# Patient Record
Sex: FEMALE | Race: WHITE | Employment: OTHER | URBAN - METROPOLITAN AREA
[De-identification: names, ages, dates, MRNs, and addresses within clinical notes are randomized per-mention and may not be internally consistent; named-entity substitution may affect disease eponyms.]

---

## 2017-04-28 PROBLEM — M54.2 NECK PAIN: Status: ACTIVE | Noted: 2017-04-28

## 2017-04-28 PROBLEM — M48.02 STENOSIS OF CERVICAL SPINE: Status: ACTIVE | Noted: 2017-04-28

## 2018-06-08 PROBLEM — Z85.3 HISTORY OF BREAST CANCER: Status: ACTIVE | Noted: 2018-06-08

## 2018-11-20 ENCOUNTER — HOSPITAL ENCOUNTER (OUTPATIENT)
Age: 68
Discharge: HOME | End: 2018-11-22
Payer: MEDICARE

## 2018-11-20 PROCEDURE — 87186 SC STD MICRODIL/AGAR DIL: CPT

## 2018-11-20 PROCEDURE — 87077 CULTURE AEROBIC IDENTIFY: CPT

## 2018-11-20 PROCEDURE — 87088 URINE BACTERIA CULTURE: CPT

## 2018-11-22 LAB
ORGANISM: ABNORMAL
URINE CULTURE, ROUTINE: ABNORMAL
URINE CULTURE, ROUTINE: ABNORMAL

## 2018-12-07 ENCOUNTER — HOSPITAL ENCOUNTER (OUTPATIENT)
Age: 68
Discharge: HOME | End: 2018-12-09
Payer: MEDICARE

## 2018-12-07 PROCEDURE — 87088 URINE BACTERIA CULTURE: CPT

## 2018-12-10 LAB — URINE CULTURE, ROUTINE: NORMAL

## 2019-02-13 ENCOUNTER — HOSPITAL ENCOUNTER (OUTPATIENT)
Age: 69
Discharge: HOME | End: 2019-02-15
Payer: MEDICARE

## 2019-02-13 PROCEDURE — 87088 URINE BACTERIA CULTURE: CPT

## 2019-02-16 LAB — URINE CULTURE, ROUTINE: NORMAL

## 2019-04-08 ENCOUNTER — HOSPITAL ENCOUNTER (OUTPATIENT)
Age: 69
Discharge: HOME | End: 2019-04-10
Payer: MEDICARE

## 2019-04-08 PROCEDURE — 87088 URINE BACTERIA CULTURE: CPT

## 2019-04-08 PROCEDURE — 88112 CYTOPATH CELL ENHANCE TECH: CPT

## 2019-04-10 LAB — URINE CULTURE, ROUTINE: NORMAL

## 2019-05-24 ENCOUNTER — OFFICE VISIT (OUTPATIENT)
Dept: SURGERY | Age: 69
End: 2019-05-24
Payer: MEDICARE

## 2019-05-24 VITALS
WEIGHT: 153 LBS | HEIGHT: 60 IN | HEART RATE: 75 BPM | RESPIRATION RATE: 16 BRPM | TEMPERATURE: 98 F | BODY MASS INDEX: 30.04 KG/M2 | DIASTOLIC BLOOD PRESSURE: 70 MMHG | SYSTOLIC BLOOD PRESSURE: 126 MMHG | OXYGEN SATURATION: 95 %

## 2019-05-24 DIAGNOSIS — Z98.890 HISTORY OF BREAST RECONSTRUCTION: Primary | ICD-10-CM

## 2019-05-24 PROCEDURE — 1036F TOBACCO NON-USER: CPT | Performed by: PLASTIC SURGERY

## 2019-05-24 PROCEDURE — 99204 OFFICE O/P NEW MOD 45 MIN: CPT | Performed by: PLASTIC SURGERY

## 2019-05-24 PROCEDURE — 4040F PNEUMOC VAC/ADMIN/RCVD: CPT | Performed by: PLASTIC SURGERY

## 2019-05-24 PROCEDURE — G8417 CALC BMI ABV UP PARAM F/U: HCPCS | Performed by: PLASTIC SURGERY

## 2019-05-24 PROCEDURE — 1123F ACP DISCUSS/DSCN MKR DOCD: CPT | Performed by: PLASTIC SURGERY

## 2019-05-24 PROCEDURE — 1090F PRES/ABSN URINE INCON ASSESS: CPT | Performed by: PLASTIC SURGERY

## 2019-05-24 PROCEDURE — G8400 PT W/DXA NO RESULTS DOC: HCPCS | Performed by: PLASTIC SURGERY

## 2019-05-24 PROCEDURE — 3017F COLORECTAL CA SCREEN DOC REV: CPT | Performed by: PLASTIC SURGERY

## 2019-05-24 PROCEDURE — G8428 CUR MEDS NOT DOCUMENT: HCPCS | Performed by: PLASTIC SURGERY

## 2019-05-24 RX ORDER — MAGNESIUM 200 MG
TABLET ORAL
COMMUNITY

## 2019-05-24 RX ORDER — PHENOL 1.4 %
1 AEROSOL, SPRAY (ML) MUCOUS MEMBRANE DAILY
COMMUNITY

## 2019-05-24 NOTE — PROGRESS NOTES
Department of Plastic Surgery - Adult  Attending Consult Note          CHIEF COMPLAINT:  History of Right Breast Cancer    History Obtained From:  patient    HISTORY OF PRESENT ILLNESS:                The patient is a 68 y.o. female who presents with History of Right breast cancer. She states she had a right mastectomy with a left prophylactic mastectomy in 1999. She then had immediate reconstruction with saline breast implants. She states that she had her right breast implant removed and replaced in 2003 with a new saline implant. She states her left breast implant is her original implant from [address] [place] [address]. She states that she noticed some left breast pain proximally one year prior she states that this is been constant since that time. She presents to our office today to have her implants examined and her left breast pain examined as well. Past Medical History:    Past Medical History:   Diagnosis Date    Cancer [facility])     1999 right breast cancer / treated with surgery and chemo    Depression     Ganglion cyst     right middle finger    Hyperlipidemia     Hypothyroidism     Thyroid disease      Past Surgical History:    Past Surgical History:   Procedure Laterality Date    BREAST SURGERY  1999    radical mastectomy r / mastectomy at left    CATARACT REMOVAL Bilateral 2012   [address]    CHOLECYSTECTOMY  2007    with Whipple    EYE SURGERY Right 1997    to repair muscle     HERNIA REPAIR Right years ago    inguinal    HYSTERECTOMY  1978    OTHER SURGICAL HISTORY Right 10/18/2017    EXCISION GANGLION CYST RIGHT MIDDLE FINGER    PANCREATECTOMY  2007    Whipple 1/3 taken      Current Medications:   No current facility-administered medications for this visit. Allergies:  Azithromycin;  Doxycycline; and Zanaflex [tizanidine]    Social History:   Social History     Socioeconomic History    Marital status:      Spouse name: Not on file    Number of children: Not on file    Years of education: Not on file    Highest education level: Not on file   Occupational History    Not on file   Social Needs    Financial resource strain: Not on file    Food insecurity:     Worry: Not on file     Inability: Not on file    Transportation needs:     Medical: Not on file     Non-medical: Not on file   Tobacco Use    Smoking status: Never Smoker    Smokeless tobacco: Never Used   Substance and Sexual Activity    Alcohol use: No    Drug use: No    Sexual activity: Not on file   Lifestyle    Physical activity:     Days per week: Not on file     Minutes per session: Not on file    Stress: Not on file   Relationships    Social connections:     Talks on phone: Not on file     Gets together: Not on file     Attends Denominational service: Not on file     Active member of club or organization: Not on file     Attends meetings of clubs or organizations: Not on file     Relationship status: Not on file    Intimate partner violence:     Fear of current or ex partner: Not on file     Emotionally abused: Not on file     Physically abused: Not on file     Forced sexual activity: Not on file   Other Topics Concern    Not on file   Social History Narrative    Not on file     Family History:   History reviewed. No pertinent family history.     REVIEW OF SYSTEMS:    CONSTITUTIONAL:  negative  RESPIRATORY:  negative  CARDIOVASCULAR:  negative  GASTROINTESTINAL:  negative  BEHAVIOR/PSYCH:  negative  All other review of systems negative    PHYSICAL EXAM:    VITALS:  Resp 16   Ht 5' (1.524 m)   Wt 153 lb (69.4 kg)   BMI 29.88 kg/m²   CONSTITUTIONAL:  awake, alert, cooperative, no apparent distress, and appears stated age  LUNGS:  No increased work of breathing, good air exchange, clear to auscultation bilaterally, no crackles or wheezing  CARDIOVASCULAR:  Normal apical impulse, regular rate and rhythm, normal S1 and S2, no S3 or S4, and no murmur noted    LEFT BREAST: Rash is not noted, There are no masses palpated, no axillary lymphadenopathy, no nipple discharge. No breast pain. There are  previous scars, the left breast is dense and hard to palpation. Tenderness throughout left breast reconstruction patient has native nipple and areola    RIGHT BREAST: Rash is not noted, There are no masses palpated , no axillary lymphadenopathy, no nipple discharge. No breast pain. There are  previous scars, nipple and areola tattooing are noted    DATA:    Radiology Review:  Reviewed previous MRI and ultrasound. Breast Measurements were taken and are noted in the media section. IMPRESSION/RECOMMENDATIONS:      I have counseled patient for greater than 30 minutes on her breast reconstructive options. I have discussed 2 stage reconstruction with placement of tissue expander with subsequent permanent implant. She understands that this will require at least 2 surgeries and a matching procedure for the unaffected breast. She understands that she may not be a candidate for this procedure if she is having radiation therapy. If radiation therapy is part of her treatment, she understands that there can be wound healing complications and/or infections that can necessitate the removal of the expander and implant. The patient is aware that according to some data there can be up to 71% complication rate and 54% implant extrusion rate with radiation following immediate expander placement. Depending upon tumor location and size, she may have placement of the   expanders with need for immediate chest wall irradiation, ( margins are close/invoved). If margins are clear then expander filling may begin with chest radiation therapy being deferred for several weeks to months thereafter. She is aware that post-radiotherapy filling is more uncomfortable as the irradiated/irritated skin becomes fibrotic, dry, and less easily stretched without discomfort.  On the other hand if margins are good, expansion could take place until desired results achieved, then radiotherapy could be given. This is less uncomfortable for the patient. We also discussed TRAM or Latissimus flap reconstruction. She understands that the abdomen may have bulging and she may have some functional deficit and weakness. She will have a \"tummy tuck\" type scar. If the Latissimus is used she may have functional deficit and weakness as well as a scar. I also educated her on free flap reconstruction with MINA flaps. She would have to go to another institution that provides this service if she is interested in pursuing her options. She understands that these can be lengthy surgeries with higher anesthesia risks and higher liklihood of anastomotic complications. I have informed the patient that no matter which option is performed, that symmetry and similar shape between each breast will be the goal. However, a reconstructed breast will never appear the same as a native breast and to expect sone differences between each breasts. There is a likleyhood for needing more than one surgery for revisions. The patient was educated on the risks involving (Breast Implant Associated-Anaplastic Large Cell Lymphoma (MIRLANDE-ALCL)). MIRLANDE-ALCL is not a breast cancer, but a rare and treatable T-cell lymphoma that usually develops as a fluid swelling around breast implants. The lifetime risk for this disease appears to be about 1 case for every 30,000 textured implants. Thus far, there have been no confirmed cases of MIRLANDE-ALCL in women who have had only smooth-surface breast implants. The FDA is not recommending removal of textured implants. Rather, the FDA recommends, as do I, that every woman conduct regular self-examination. The patient was educated that if she does develop MIRLANDE-ALCL she may require additional treatment such as radiation or chemotherapy along with removal of the breast implant and surrounding scar tissue. The risks, benefits and options were discussed with the pt.  The risks included but not limited to pain, bleeding, infection, heavy scarring, damage to surrounding structures,  and need for further procedures. Other risks including but not limited to asymmetry, loss of nipple or/and areola, loss of sensation to nipple and areola, inability to breast feed, seroma, hematoma, implant failure, capsular contracture, and fat necrosis were also discussed with the patient. All of her questions were answered. .    After long discussion with the patient regarding her history of reconstruction informed her that she likely has a capsular contracture on the left breast reconstruction. I informed patient 2nd to the age of her implants she may want to elect for bilateral capsulectomy and replacement with implants. Patient voices understanding would like to proceed. She will like to proceed with silicone breast implants in exchange of saline      Plastic and Reconstructive surgical procedure- removal of bilateral saline breast implants bilateral capsulectomy replacement with silicone breast implants    All of her questions were answered to her satisfaction and she agrees to proceed with the operation. Face-to-face time greater than 45 minutes, greater than 50% in counseling, education, and coordination of care. Photos were obtained. Chaperone present      I attest that the patient was seen and examined by me, and concur with the documentation above. I agree with the assessment and the plan outlined. This document is generated, in part, by voice recognition software and thus  syntax and grammatical errors are possible.     Abdias Michel  10:03 AM  5/30/2019

## 2019-07-01 ENCOUNTER — HOSPITAL ENCOUNTER (OUTPATIENT)
Age: 69
Discharge: HOME | End: 2019-07-03
Payer: MEDICARE

## 2019-07-01 LAB
ALBUMIN SERPL-MCNC: 4.5 G/DL (ref 3.5–5.2)
ALP BLD-CCNC: 114 U/L (ref 35–104)
ALT SERPL-CCNC: 20 U/L (ref 0–32)
ANION GAP SERPL CALCULATED.3IONS-SCNC: 12 MMOL/L (ref 7–16)
AST SERPL-CCNC: 22 U/L (ref 0–31)
BASOPHILS ABSOLUTE: 0.04 E9/L (ref 0–0.2)
BASOPHILS RELATIVE PERCENT: 0.3 % (ref 0–2)
BILIRUB SERPL-MCNC: 0.5 MG/DL (ref 0–1.2)
BILIRUBIN URINE: NEGATIVE
BLOOD, URINE: NEGATIVE
BUN BLDV-MCNC: 16 MG/DL (ref 8–23)
CALCIUM SERPL-MCNC: 10.1 MG/DL (ref 8.6–10.2)
CHLORIDE BLD-SCNC: 104 MMOL/L (ref 98–107)
CHOLESTEROL, TOTAL: 241 MG/DL (ref 0–199)
CLARITY: CLEAR
CO2: 29 MMOL/L (ref 22–29)
COLOR: YELLOW
CREAT SERPL-MCNC: 0.9 MG/DL (ref 0.5–1)
EOSINOPHILS ABSOLUTE: 0.28 E9/L (ref 0.05–0.5)
EOSINOPHILS RELATIVE PERCENT: 2.4 % (ref 0–6)
GFR AFRICAN AMERICAN: >60
GFR NON-AFRICAN AMERICAN: >60 ML/MIN/1.73
GLUCOSE BLD-MCNC: 103 MG/DL (ref 74–99)
GLUCOSE URINE: NEGATIVE MG/DL
HBA1C MFR BLD: 5.8 % (ref 4–5.6)
HCT VFR BLD CALC: 43.4 % (ref 34–48)
HDLC SERPL-MCNC: 60 MG/DL
HEMOGLOBIN: 13.8 G/DL (ref 11.5–15.5)
IMMATURE GRANULOCYTES #: 0.04 E9/L
IMMATURE GRANULOCYTES %: 0.3 % (ref 0–5)
KETONES, URINE: NEGATIVE MG/DL
LDL CHOLESTEROL CALCULATED: 155 MG/DL (ref 0–99)
LEUKOCYTE ESTERASE, URINE: NEGATIVE
LYMPHOCYTES ABSOLUTE: 1.56 E9/L (ref 1.5–4)
LYMPHOCYTES RELATIVE PERCENT: 13.5 % (ref 20–42)
MCH RBC QN AUTO: 28.4 PG (ref 26–35)
MCHC RBC AUTO-ENTMCNC: 31.8 % (ref 32–34.5)
MCV RBC AUTO: 89.3 FL (ref 80–99.9)
MICROALBUMIN UR-MCNC: 12.1 MG/L
MONOCYTES ABSOLUTE: 0.96 E9/L (ref 0.1–0.95)
MONOCYTES RELATIVE PERCENT: 8.3 % (ref 2–12)
NEUTROPHILS ABSOLUTE: 8.71 E9/L (ref 1.8–7.3)
NEUTROPHILS RELATIVE PERCENT: 75.2 % (ref 43–80)
NITRITE, URINE: NEGATIVE
PDW BLD-RTO: 13.2 FL (ref 11.5–15)
PH UA: 6 (ref 5–9)
PLATELET # BLD: 300 E9/L (ref 130–450)
PMV BLD AUTO: 11.1 FL (ref 7–12)
POTASSIUM SERPL-SCNC: 5.5 MMOL/L (ref 3.5–5)
PROTEIN UA: NEGATIVE MG/DL
RBC # BLD: 4.86 E12/L (ref 3.5–5.5)
SODIUM BLD-SCNC: 145 MMOL/L (ref 132–146)
SPECIFIC GRAVITY UA: 1.02 (ref 1–1.03)
T4 TOTAL: 10.1 MCG/DL (ref 4.5–11.7)
TOTAL PROTEIN: 7.3 G/DL (ref 6.4–8.3)
TRIGL SERPL-MCNC: 130 MG/DL (ref 0–149)
TSH SERPL DL<=0.05 MIU/L-ACNC: 0.74 UIU/ML (ref 0.27–4.2)
UROBILINOGEN, URINE: 0.2 E.U./DL
VITAMIN D 25-HYDROXY: 33 NG/ML (ref 30–100)
VLDLC SERPL CALC-MCNC: 26 MG/DL
WBC # BLD: 11.6 E9/L (ref 4.5–11.5)

## 2019-07-01 PROCEDURE — 36415 COLL VENOUS BLD VENIPUNCTURE: CPT

## 2019-07-01 PROCEDURE — 87088 URINE BACTERIA CULTURE: CPT

## 2019-07-01 PROCEDURE — 84436 ASSAY OF TOTAL THYROXINE: CPT

## 2019-07-01 PROCEDURE — 81003 URINALYSIS AUTO W/O SCOPE: CPT

## 2019-07-01 PROCEDURE — 85025 COMPLETE CBC W/AUTO DIFF WBC: CPT

## 2019-07-01 PROCEDURE — 82044 UR ALBUMIN SEMIQUANTITATIVE: CPT

## 2019-07-01 PROCEDURE — 84443 ASSAY THYROID STIM HORMONE: CPT

## 2019-07-01 PROCEDURE — 80053 COMPREHEN METABOLIC PANEL: CPT

## 2019-07-01 PROCEDURE — 80061 LIPID PANEL: CPT

## 2019-07-01 PROCEDURE — 82306 VITAMIN D 25 HYDROXY: CPT

## 2019-07-01 PROCEDURE — 83036 HEMOGLOBIN GLYCOSYLATED A1C: CPT

## 2019-07-03 LAB — URINE CULTURE, ROUTINE: NORMAL

## 2019-07-08 ENCOUNTER — OFFICE VISIT (OUTPATIENT)
Dept: PRIMARY CARE CLINIC | Age: 69
End: 2019-07-08
Payer: MEDICARE

## 2019-07-08 VITALS
SYSTOLIC BLOOD PRESSURE: 128 MMHG | TEMPERATURE: 98.2 F | HEIGHT: 60 IN | WEIGHT: 153.2 LBS | DIASTOLIC BLOOD PRESSURE: 82 MMHG | BODY MASS INDEX: 30.08 KG/M2

## 2019-07-08 DIAGNOSIS — E11.9 DIET-CONTROLLED DIABETES MELLITUS (HCC): ICD-10-CM

## 2019-07-08 DIAGNOSIS — E55.9 VITAMIN D DEFICIENCY: ICD-10-CM

## 2019-07-08 DIAGNOSIS — E03.9 ACQUIRED HYPOTHYROIDISM: ICD-10-CM

## 2019-07-08 DIAGNOSIS — K29.50 CHRONIC GASTRITIS WITHOUT BLEEDING, UNSPECIFIED GASTRITIS TYPE: ICD-10-CM

## 2019-07-08 DIAGNOSIS — N30.00 ACUTE CYSTITIS WITHOUT HEMATURIA: ICD-10-CM

## 2019-07-08 DIAGNOSIS — I10 HYPERTENSION, UNSPECIFIED TYPE: Primary | ICD-10-CM

## 2019-07-08 DIAGNOSIS — F41.9 ANXIETY: ICD-10-CM

## 2019-07-08 DIAGNOSIS — M17.12 PRIMARY OSTEOARTHRITIS OF LEFT KNEE: ICD-10-CM

## 2019-07-08 PROCEDURE — 99215 OFFICE O/P EST HI 40 MIN: CPT | Performed by: FAMILY MEDICINE

## 2019-07-08 PROCEDURE — 93000 ELECTROCARDIOGRAM COMPLETE: CPT | Performed by: FAMILY MEDICINE

## 2019-07-08 PROCEDURE — 81003 URINALYSIS AUTO W/O SCOPE: CPT | Performed by: FAMILY MEDICINE

## 2019-07-08 RX ORDER — LEVOTHYROXINE SODIUM 0.1 MG/1
TABLET ORAL
Qty: 60 TABLET | Refills: 1 | Status: SHIPPED
Start: 2019-07-08 | End: 2020-03-30 | Stop reason: SDUPTHER

## 2019-07-08 RX ORDER — PANTOPRAZOLE SODIUM 40 MG/1
40 TABLET, DELAYED RELEASE ORAL
Qty: 90 TABLET | Refills: 1 | Status: SHIPPED | OUTPATIENT
Start: 2019-07-08 | End: 2020-01-08

## 2019-07-08 RX ORDER — LEVOTHYROXINE SODIUM 88 UG/1
TABLET ORAL
Qty: 45 TABLET | Refills: 12 | Status: SHIPPED
Start: 2019-07-08 | End: 2020-03-30 | Stop reason: SDUPTHER

## 2019-07-08 RX ORDER — FLUOXETINE 10 MG/1
10 CAPSULE ORAL DAILY
Qty: 90 CAPSULE | Refills: 12 | Status: SHIPPED
Start: 2019-07-08 | End: 2020-03-30 | Stop reason: SDUPTHER

## 2019-07-08 ASSESSMENT — ENCOUNTER SYMPTOMS
GASTROINTESTINAL NEGATIVE: 1
RESPIRATORY NEGATIVE: 1
EYES NEGATIVE: 1
ALLERGIC/IMMUNOLOGIC NEGATIVE: 1

## 2019-07-18 RX ORDER — FLUTICASONE PROPIONATE 50 MCG
1 SPRAY, SUSPENSION (ML) NASAL 2 TIMES DAILY
Refills: 12 | COMMUNITY
Start: 2019-06-06 | End: 2019-10-28 | Stop reason: SDUPTHER

## 2019-07-22 ENCOUNTER — TELEPHONE (OUTPATIENT)
Dept: SURGERY | Age: 69
End: 2019-07-22

## 2019-07-24 NOTE — H&P
Department of Plastic Surgery - Adult  Attending Consult Note              CHIEF COMPLAINT:  History of Right Breast Cancer     History Obtained From:  patient     HISTORY OF PRESENT ILLNESS:                 The patient is a 68 y.o. female who presents with History of Right breast cancer. She states she had a right mastectomy with a left prophylactic mastectomy in 1999. She then had immediate reconstruction with saline breast implants. She states that she had her right breast implant removed and replaced in 2003 with a new saline implant. She states her left breast implant is her original implant from [address] [place] [address]. She states that she noticed some left breast pain proximally one year prior she states that this is been constant since that time. She presents to our office today to have her implants examined and her left breast pain examined as well.        Past Medical History:    Past Medical History        Past Medical History:   Diagnosis Date    Cancer [facility])       1999 right breast cancer / treated with surgery and chemo    Depression      Ganglion cyst       right middle finger    Hyperlipidemia      Hypothyroidism      Thyroid disease           Past Surgical History:    Past Surgical History   Past Surgical History:   Procedure Laterality Date    BREAST SURGERY   1999     radical mastectomy r / mastectomy at left    CATARACT REMOVAL Bilateral 2012   [facility]   2007     with Whipple    EYE SURGERY Right 1997     to repair muscle     HERNIA REPAIR Right years ago     inguinal    HYSTERECTOMY   1978    OTHER SURGICAL HISTORY Right 10/18/2017     EXCISION GANGLION CYST RIGHT MIDDLE FINGER    PANCREATECTOMY   2007     ipple 1/3 taken          Current Medications:   Current Hospital Medications   No current facility-administered medications for this visit. Allergies:  Azithromycin;  Doxycycline; and Zanaflex [tizanidine]     Social History:   Social History

## 2019-07-25 ENCOUNTER — ANESTHESIA EVENT (OUTPATIENT)
Dept: OPERATING ROOM | Age: 69
End: 2019-07-25
Payer: MEDICARE

## 2019-07-25 ENCOUNTER — HOSPITAL ENCOUNTER (OUTPATIENT)
Age: 69
Setting detail: OUTPATIENT SURGERY
Discharge: HOME | End: 2019-07-25
Attending: PLASTIC SURGERY | Admitting: PLASTIC SURGERY
Payer: MEDICARE

## 2019-07-25 ENCOUNTER — ANESTHESIA (OUTPATIENT)
Dept: OPERATING ROOM | Age: 69
End: 2019-07-25
Payer: MEDICARE

## 2019-07-25 VITALS
OXYGEN SATURATION: 96 % | TEMPERATURE: 97.2 F | RESPIRATION RATE: 18 BRPM | WEIGHT: 153 LBS | BODY MASS INDEX: 30.04 KG/M2 | DIASTOLIC BLOOD PRESSURE: 77 MMHG | SYSTOLIC BLOOD PRESSURE: 142 MMHG | HEIGHT: 60 IN | HEART RATE: 72 BPM

## 2019-07-25 VITALS
RESPIRATION RATE: 1 BRPM | TEMPERATURE: 96.3 F | DIASTOLIC BLOOD PRESSURE: 101 MMHG | OXYGEN SATURATION: 98 % | SYSTOLIC BLOOD PRESSURE: 181 MMHG

## 2019-07-25 DIAGNOSIS — G89.18 POST-OP PAIN: Primary | ICD-10-CM

## 2019-07-25 PROCEDURE — 6360000002 HC RX W HCPCS: Performed by: PHYSICIAN ASSISTANT

## 2019-07-25 PROCEDURE — 6360000002 HC RX W HCPCS: Performed by: ANESTHESIOLOGY

## 2019-07-25 PROCEDURE — 88300 SURGICAL PATH GROSS: CPT

## 2019-07-25 PROCEDURE — 6370000000 HC RX 637 (ALT 250 FOR IP): Performed by: PLASTIC SURGERY

## 2019-07-25 PROCEDURE — 2580000003 HC RX 258: Performed by: ANESTHESIOLOGIST ASSISTANT

## 2019-07-25 PROCEDURE — 2500000003 HC RX 250 WO HCPCS: Performed by: ANESTHESIOLOGIST ASSISTANT

## 2019-07-25 PROCEDURE — 6360000002 HC RX W HCPCS: Performed by: PLASTIC SURGERY

## 2019-07-25 PROCEDURE — 3600000015 HC SURGERY LEVEL 5 ADDTL 15MIN: Performed by: PLASTIC SURGERY

## 2019-07-25 PROCEDURE — 3700000000 HC ANESTHESIA ATTENDED CARE: Performed by: PLASTIC SURGERY

## 2019-07-25 PROCEDURE — 19340 INSJ BREAST IMPLT SM D MAST: CPT | Performed by: PLASTIC SURGERY

## 2019-07-25 PROCEDURE — C1789 PROSTHESIS, BREAST, IMP: HCPCS | Performed by: PLASTIC SURGERY

## 2019-07-25 PROCEDURE — 3700000001 HC ADD 15 MINUTES (ANESTHESIA): Performed by: PLASTIC SURGERY

## 2019-07-25 PROCEDURE — 2580000003 HC RX 258: Performed by: PHYSICIAN ASSISTANT

## 2019-07-25 PROCEDURE — 7100000001 HC PACU RECOVERY - ADDTL 15 MIN: Performed by: PLASTIC SURGERY

## 2019-07-25 PROCEDURE — 3600000005 HC SURGERY LEVEL 5 BASE: Performed by: PLASTIC SURGERY

## 2019-07-25 PROCEDURE — 7100000000 HC PACU RECOVERY - FIRST 15 MIN: Performed by: PLASTIC SURGERY

## 2019-07-25 PROCEDURE — 88302 TISSUE EXAM BY PATHOLOGIST: CPT

## 2019-07-25 PROCEDURE — 88304 TISSUE EXAM BY PATHOLOGIST: CPT

## 2019-07-25 PROCEDURE — 2580000003 HC RX 258: Performed by: PLASTIC SURGERY

## 2019-07-25 PROCEDURE — 6360000002 HC RX W HCPCS: Performed by: ANESTHESIOLOGIST ASSISTANT

## 2019-07-25 PROCEDURE — 7100000010 HC PHASE II RECOVERY - FIRST 15 MIN: Performed by: PLASTIC SURGERY

## 2019-07-25 PROCEDURE — 19371 PERI-IMPLT CAPSLC BRST COMPL: CPT | Performed by: PLASTIC SURGERY

## 2019-07-25 PROCEDURE — 2709999900 HC NON-CHARGEABLE SUPPLY: Performed by: PLASTIC SURGERY

## 2019-07-25 PROCEDURE — 2500000003 HC RX 250 WO HCPCS: Performed by: PLASTIC SURGERY

## 2019-07-25 PROCEDURE — 2720000010 HC SURG SUPPLY STERILE: Performed by: PLASTIC SURGERY

## 2019-07-25 PROCEDURE — 7100000011 HC PHASE II RECOVERY - ADDTL 15 MIN: Performed by: PLASTIC SURGERY

## 2019-07-25 DEVICE — SMOOTH MODERATE PLUS PROFILE XTRA 465CC  SMOOTH ROUND SILICONE
Type: IMPLANTABLE DEVICE | Site: BREAST | Status: FUNCTIONAL
Brand: MENTOR MEMORYGEL XTRA BREAST IMPLANT

## 2019-07-25 RX ORDER — NEOSTIGMINE METHYLSULFATE 1 MG/ML
INJECTION, SOLUTION INTRAVENOUS PRN
Status: DISCONTINUED | OUTPATIENT
Start: 2019-07-25 | End: 2019-07-25 | Stop reason: SDUPTHER

## 2019-07-25 RX ORDER — FENTANYL CITRATE 50 UG/ML
25 INJECTION, SOLUTION INTRAMUSCULAR; INTRAVENOUS EVERY 5 MIN PRN
Status: DISCONTINUED | OUTPATIENT
Start: 2019-07-25 | End: 2019-07-25 | Stop reason: HOSPADM

## 2019-07-25 RX ORDER — DIAPER,BRIEF,INFANT-TODD,DISP
EACH MISCELLANEOUS PRN
Status: DISCONTINUED | OUTPATIENT
Start: 2019-07-25 | End: 2019-07-25 | Stop reason: ALTCHOICE

## 2019-07-25 RX ORDER — OXYCODONE HYDROCHLORIDE AND ACETAMINOPHEN 5; 325 MG/1; MG/1
1 TABLET ORAL EVERY 6 HOURS PRN
Qty: 15 TABLET | Refills: 0 | Status: SHIPPED | OUTPATIENT
Start: 2019-07-25 | End: 2019-08-01

## 2019-07-25 RX ORDER — SODIUM CHLORIDE 0.9 % (FLUSH) 0.9 %
10 SYRINGE (ML) INJECTION EVERY 12 HOURS SCHEDULED
Status: CANCELLED | OUTPATIENT
Start: 2019-07-25

## 2019-07-25 RX ORDER — ONDANSETRON 4 MG/1
4 TABLET, FILM COATED ORAL DAILY PRN
Qty: 12 TABLET | Refills: 1 | Status: SHIPPED | OUTPATIENT
Start: 2019-07-25 | End: 2020-01-08

## 2019-07-25 RX ORDER — ONDANSETRON 2 MG/ML
INJECTION INTRAMUSCULAR; INTRAVENOUS PRN
Status: DISCONTINUED | OUTPATIENT
Start: 2019-07-25 | End: 2019-07-25 | Stop reason: SDUPTHER

## 2019-07-25 RX ORDER — SODIUM CHLORIDE 9 MG/ML
INJECTION, SOLUTION INTRAVENOUS CONTINUOUS PRN
Status: DISCONTINUED | OUTPATIENT
Start: 2019-07-25 | End: 2019-07-25 | Stop reason: SDUPTHER

## 2019-07-25 RX ORDER — GLYCOPYRROLATE 1 MG/5 ML
SYRINGE (ML) INTRAVENOUS PRN
Status: DISCONTINUED | OUTPATIENT
Start: 2019-07-25 | End: 2019-07-25 | Stop reason: SDUPTHER

## 2019-07-25 RX ORDER — SODIUM CHLORIDE 0.9 % (FLUSH) 0.9 %
10 SYRINGE (ML) INJECTION PRN
Status: DISCONTINUED | OUTPATIENT
Start: 2019-07-25 | End: 2019-07-25 | Stop reason: HOSPADM

## 2019-07-25 RX ORDER — BUPIVACAINE HYDROCHLORIDE AND EPINEPHRINE 2.5; 5 MG/ML; UG/ML
INJECTION, SOLUTION EPIDURAL; INFILTRATION; INTRACAUDAL; PERINEURAL PRN
Status: DISCONTINUED | OUTPATIENT
Start: 2019-07-25 | End: 2019-07-25 | Stop reason: ALTCHOICE

## 2019-07-25 RX ORDER — LIDOCAINE HYDROCHLORIDE 20 MG/ML
INJECTION, SOLUTION INTRAVENOUS PRN
Status: DISCONTINUED | OUTPATIENT
Start: 2019-07-25 | End: 2019-07-25 | Stop reason: SDUPTHER

## 2019-07-25 RX ORDER — OXYCODONE HYDROCHLORIDE AND ACETAMINOPHEN 5; 325 MG/1; MG/1
1 TABLET ORAL EVERY 4 HOURS PRN
Status: CANCELLED | OUTPATIENT
Start: 2019-07-25

## 2019-07-25 RX ORDER — SODIUM CHLORIDE 0.9 % (FLUSH) 0.9 %
10 SYRINGE (ML) INJECTION PRN
Status: CANCELLED | OUTPATIENT
Start: 2019-07-25

## 2019-07-25 RX ORDER — HYDROCODONE BITARTRATE AND ACETAMINOPHEN 5; 325 MG/1; MG/1
1 TABLET ORAL PRN
Status: DISCONTINUED | OUTPATIENT
Start: 2019-07-25 | End: 2019-07-25 | Stop reason: HOSPADM

## 2019-07-25 RX ORDER — HYDROCODONE BITARTRATE AND ACETAMINOPHEN 5; 325 MG/1; MG/1
2 TABLET ORAL PRN
Status: DISCONTINUED | OUTPATIENT
Start: 2019-07-25 | End: 2019-07-25 | Stop reason: HOSPADM

## 2019-07-25 RX ORDER — ONDANSETRON 2 MG/ML
4 INJECTION INTRAMUSCULAR; INTRAVENOUS EVERY 6 HOURS PRN
Status: CANCELLED | OUTPATIENT
Start: 2019-07-25

## 2019-07-25 RX ORDER — MORPHINE SULFATE 2 MG/ML
2 INJECTION, SOLUTION INTRAMUSCULAR; INTRAVENOUS EVERY 5 MIN PRN
Status: DISCONTINUED | OUTPATIENT
Start: 2019-07-25 | End: 2019-07-25 | Stop reason: HOSPADM

## 2019-07-25 RX ORDER — SODIUM CHLORIDE 0.9 % (FLUSH) 0.9 %
10 SYRINGE (ML) INJECTION EVERY 12 HOURS SCHEDULED
Status: DISCONTINUED | OUTPATIENT
Start: 2019-07-25 | End: 2019-07-25 | Stop reason: HOSPADM

## 2019-07-25 RX ORDER — PROPOFOL 10 MG/ML
INJECTION, EMULSION INTRAVENOUS PRN
Status: DISCONTINUED | OUTPATIENT
Start: 2019-07-25 | End: 2019-07-25 | Stop reason: SDUPTHER

## 2019-07-25 RX ORDER — SODIUM CHLORIDE 9 MG/ML
INJECTION, SOLUTION INTRAVENOUS CONTINUOUS
Status: DISCONTINUED | OUTPATIENT
Start: 2019-07-25 | End: 2019-07-25 | Stop reason: HOSPADM

## 2019-07-25 RX ORDER — DEXAMETHASONE SODIUM PHOSPHATE 10 MG/ML
INJECTION INTRAMUSCULAR; INTRAVENOUS PRN
Status: DISCONTINUED | OUTPATIENT
Start: 2019-07-25 | End: 2019-07-25 | Stop reason: SDUPTHER

## 2019-07-25 RX ORDER — ROCURONIUM BROMIDE 10 MG/ML
INJECTION, SOLUTION INTRAVENOUS PRN
Status: DISCONTINUED | OUTPATIENT
Start: 2019-07-25 | End: 2019-07-25 | Stop reason: SDUPTHER

## 2019-07-25 RX ORDER — CLINDAMYCIN HYDROCHLORIDE 300 MG/1
300 CAPSULE ORAL 3 TIMES DAILY
Qty: 15 CAPSULE | Refills: 0 | Status: SHIPPED | OUTPATIENT
Start: 2019-07-25 | End: 2019-07-30

## 2019-07-25 RX ORDER — FENTANYL CITRATE 50 UG/ML
INJECTION, SOLUTION INTRAMUSCULAR; INTRAVENOUS PRN
Status: DISCONTINUED | OUTPATIENT
Start: 2019-07-25 | End: 2019-07-25 | Stop reason: SDUPTHER

## 2019-07-25 RX ADMIN — Medication 4 MG: at 09:28

## 2019-07-25 RX ADMIN — FENTANYL CITRATE 100 MCG: 50 INJECTION, SOLUTION INTRAMUSCULAR; INTRAVENOUS at 08:10

## 2019-07-25 RX ADMIN — Medication 2 G: at 08:13

## 2019-07-25 RX ADMIN — PHENYLEPHRINE HYDROCHLORIDE 100 MCG: 10 INJECTION INTRAVENOUS at 08:43

## 2019-07-25 RX ADMIN — MORPHINE SULFATE 2 MG: 2 INJECTION, SOLUTION INTRAMUSCULAR; INTRAVENOUS at 10:08

## 2019-07-25 RX ADMIN — Medication 0.8 MG: at 09:28

## 2019-07-25 RX ADMIN — PHENYLEPHRINE HYDROCHLORIDE 100 MCG: 10 INJECTION INTRAVENOUS at 09:20

## 2019-07-25 RX ADMIN — DEXAMETHASONE SODIUM PHOSPHATE 10 MG: 10 INJECTION INTRAMUSCULAR; INTRAVENOUS at 08:11

## 2019-07-25 RX ADMIN — PHENYLEPHRINE HYDROCHLORIDE 100 MCG: 10 INJECTION INTRAVENOUS at 08:27

## 2019-07-25 RX ADMIN — PHENYLEPHRINE HYDROCHLORIDE 100 MCG: 10 INJECTION INTRAVENOUS at 08:58

## 2019-07-25 RX ADMIN — ROCURONIUM BROMIDE 40 MG: 10 INJECTION, SOLUTION INTRAVENOUS at 08:11

## 2019-07-25 RX ADMIN — PHENYLEPHRINE HYDROCHLORIDE 100 MCG: 10 INJECTION INTRAVENOUS at 08:35

## 2019-07-25 RX ADMIN — SODIUM CHLORIDE: 9 INJECTION, SOLUTION INTRAVENOUS at 06:26

## 2019-07-25 RX ADMIN — ONDANSETRON HYDROCHLORIDE 4 MG: 2 INJECTION, SOLUTION INTRAMUSCULAR; INTRAVENOUS at 09:28

## 2019-07-25 RX ADMIN — ROCURONIUM BROMIDE 10 MG: 10 INJECTION, SOLUTION INTRAVENOUS at 08:17

## 2019-07-25 RX ADMIN — SODIUM CHLORIDE: 9 INJECTION, SOLUTION INTRAVENOUS at 08:04

## 2019-07-25 RX ADMIN — PROPOFOL 150 MG: 10 INJECTION, EMULSION INTRAVENOUS at 08:10

## 2019-07-25 RX ADMIN — Medication 0.2 MG: at 08:58

## 2019-07-25 RX ADMIN — PHENYLEPHRINE HYDROCHLORIDE 100 MCG: 10 INJECTION INTRAVENOUS at 09:00

## 2019-07-25 RX ADMIN — LIDOCAINE HYDROCHLORIDE 80 MG: 20 INJECTION, SOLUTION INTRAVENOUS at 08:10

## 2019-07-25 RX ADMIN — FENTANYL CITRATE 100 MCG: 50 INJECTION, SOLUTION INTRAMUSCULAR; INTRAVENOUS at 08:17

## 2019-07-25 ASSESSMENT — PULMONARY FUNCTION TESTS
PIF_VALUE: 18
PIF_VALUE: 16
PIF_VALUE: 22
PIF_VALUE: 16
PIF_VALUE: 21
PIF_VALUE: 17
PIF_VALUE: 19
PIF_VALUE: 16
PIF_VALUE: 18
PIF_VALUE: 17
PIF_VALUE: 24
PIF_VALUE: 17
PIF_VALUE: 18
PIF_VALUE: 16
PIF_VALUE: 2
PIF_VALUE: 20
PIF_VALUE: 15
PIF_VALUE: 20
PIF_VALUE: 17
PIF_VALUE: 12
PIF_VALUE: 17
PIF_VALUE: 4
PIF_VALUE: 16
PIF_VALUE: 16
PIF_VALUE: 0
PIF_VALUE: 18
PIF_VALUE: 16
PIF_VALUE: 18
PIF_VALUE: 19
PIF_VALUE: 18
PIF_VALUE: 17
PIF_VALUE: 17
PIF_VALUE: 19
PIF_VALUE: 16
PIF_VALUE: 0
PIF_VALUE: 3
PIF_VALUE: 19
PIF_VALUE: 17
PIF_VALUE: 17
PIF_VALUE: 1
PIF_VALUE: 20
PIF_VALUE: 1
PIF_VALUE: 18
PIF_VALUE: 17
PIF_VALUE: 16
PIF_VALUE: 17
PIF_VALUE: 0
PIF_VALUE: 21
PIF_VALUE: 25
PIF_VALUE: 23
PIF_VALUE: 21
PIF_VALUE: 3
PIF_VALUE: 17
PIF_VALUE: 16
PIF_VALUE: 20
PIF_VALUE: 21
PIF_VALUE: 23
PIF_VALUE: 20
PIF_VALUE: 17
PIF_VALUE: 17
PIF_VALUE: 5
PIF_VALUE: 20
PIF_VALUE: 20
PIF_VALUE: 12
PIF_VALUE: 18
PIF_VALUE: 16
PIF_VALUE: 17
PIF_VALUE: 0
PIF_VALUE: 33
PIF_VALUE: 18
PIF_VALUE: 21
PIF_VALUE: 17
PIF_VALUE: 17
PIF_VALUE: 5
PIF_VALUE: 20
PIF_VALUE: 4
PIF_VALUE: 16
PIF_VALUE: 20
PIF_VALUE: 20
PIF_VALUE: 16
PIF_VALUE: 22
PIF_VALUE: 15
PIF_VALUE: 26
PIF_VALUE: 17
PIF_VALUE: 18
PIF_VALUE: 3
PIF_VALUE: 17
PIF_VALUE: 16
PIF_VALUE: 17
PIF_VALUE: 16
PIF_VALUE: 40

## 2019-07-25 ASSESSMENT — PAIN DESCRIPTION - PROGRESSION
CLINICAL_PROGRESSION: NOT CHANGED
CLINICAL_PROGRESSION: GRADUALLY WORSENING
CLINICAL_PROGRESSION: NOT CHANGED

## 2019-07-25 ASSESSMENT — PAIN DESCRIPTION - LOCATION
LOCATION: BREAST

## 2019-07-25 ASSESSMENT — PAIN DESCRIPTION - DESCRIPTORS
DESCRIPTORS: DISCOMFORT

## 2019-07-25 ASSESSMENT — PAIN - FUNCTIONAL ASSESSMENT
PAIN_FUNCTIONAL_ASSESSMENT: PREVENTS OR INTERFERES SOME ACTIVE ACTIVITIES AND ADLS
PAIN_FUNCTIONAL_ASSESSMENT: 0-10
PAIN_FUNCTIONAL_ASSESSMENT: PREVENTS OR INTERFERES SOME ACTIVE ACTIVITIES AND ADLS

## 2019-07-25 ASSESSMENT — PAIN SCALES - GENERAL
PAINLEVEL_OUTOF10: 2
PAINLEVEL_OUTOF10: 0
PAINLEVEL_OUTOF10: 7
PAINLEVEL_OUTOF10: 3

## 2019-07-25 ASSESSMENT — PAIN DESCRIPTION - ORIENTATION
ORIENTATION: LEFT

## 2019-07-25 ASSESSMENT — PAIN DESCRIPTION - FREQUENCY
FREQUENCY: INTERMITTENT
FREQUENCY: CONTINUOUS
FREQUENCY: CONTINUOUS

## 2019-07-25 ASSESSMENT — PAIN DESCRIPTION - PAIN TYPE
TYPE: SURGICAL PAIN

## 2019-07-25 NOTE — ANESTHESIA PRE PROCEDURE
07/01/2019    CREATININE 0.9 07/01/2019    GFRAA >60 07/01/2019    LABGLOM >60 07/01/2019    GLUCOSE 103 07/01/2019    GLUCOSE 107 06/07/2012    PROT 7.3 07/01/2019    CALCIUM 10.1 07/01/2019    BILITOT 0.5 07/01/2019    ALKPHOS 114 07/01/2019    AST 22 07/01/2019    ALT 20 07/01/2019       POC Tests: No results for input(s): POCGLU, POCNA, POCK, POCCL, POCBUN, POCHEMO, POCHCT in the last 72 hours. Coags: No results found for: PROTIME, INR, APTT    HCG (If Applicable): No results found for: PREGTESTUR, PREGSERUM, HCG, HCGQUANT     ABGs: No results found for: PHART, PO2ART, TFB2DLL, IAH0QHT, BEART, D5SHAYXA     Type & Screen (If Applicable):  No results found for: [facility]    Anesthesia Evaluation  Patient summary reviewed and Nursing notes reviewed no history of anesthetic complications:   Airway: Mallampati: II  TM distance: >3 FB   Neck ROM: limited  Mouth opening: > = 3 FB Dental:          Pulmonary:                              Cardiovascular:  Exercise tolerance: good (>4 METS),   (+) hypertension:,                   Neuro/Psych:   (+) psychiatric history:            GI/Hepatic/Renal:   (+) GERD:,           Endo/Other:    (+) Diabetes, hypothyroidism::., .                 Abdominal:           Vascular:                                        Anesthesia Plan      general     ASA 2       Induction: intravenous. Anesthetic plan and risks discussed with patient. Plan discussed with attending.                   Lance Hernandez 75   7/25/2019

## 2019-07-25 NOTE — ANESTHESIA POSTPROCEDURE EVALUATION
Department of Anesthesiology  Postprocedure Note    Patient: Helga Pal  MRN: [id number]  YOB: 1950  Date of evaluation: 7/25/2019  Time:  11:09 AM     Procedure Summary     Date:  07/25/19 Room / Location:  [facility] OR  / [facility]    Anesthesia Start:  0804 Anesthesia Stop:  4148    Procedure:  CAPSULECTOMY OF LEFT BREAST WITH REMOVAL OF SALINE  IMPLANTS AND REPLACMENT WITH BILATERAL SILICONE IMPLANTS (Bilateral Breast) Diagnosis:  (HX OF BREAST CANCER)    Surgeon:  Dewaine Sacks, MD Responsible Provider:  Charbel Hart MD    Anesthesia Type:  general ASA Status:  2          Anesthesia Type: general    Hill Phase I: Hill Score: 10    Hill Phase II: Hill Score: 10    Last vitals: Reviewed and per EMR flowsheets.        Anesthesia Post Evaluation    Patient location during evaluation: PACU  Patient participation: complete - patient participated  Level of consciousness: awake  Airway patency: patent  Nausea & Vomiting: no nausea and no vomiting  Complications: no  Cardiovascular status: hemodynamically stable  Respiratory status: acceptable  Hydration status: euvolemic

## 2019-07-26 NOTE — OP NOTE
filled implants. The risks, benefits and alternatives were  explained to the patient including bleeding, scarring, infection,  implant failure, need for further surgery. She understood and elected  to proceed. PROCEDURE IN DETAIL:  She was seen the day of surgery, marked and all  questions were answered. She was taken back to the operating room,  placed in the supine position. SCDs were on and functioning at time of  induction of anesthesia. Preoperative antibiotics given prior to  incision. The area was prepped and draped in the usual sterile fashion  with chlorhexidine prep stick following a wash with chlorhexidine Miller  wipes. Attention was first turned to the right breast.  The lateral  aspect of her mastectomy incision was excised and sent for permanent  pathology. Dissection was carried down to the implant capsule. The  implant was removed en bloc and was noted to be a 370 mL saline that was  likely overfilled. The capsule was inspected. No suspicious lesions  and the capsule in that side was very thin and translucent, so was not  removed. The temporary size 400 mL sizer was placed and temporary  stapled closed. Attention was turned to the contralateral breast.  The  incision was made. Dissection was carried down to the capsule. The  implant was removed intact. It was opened and noted to be a 450 mL  filled to what appeared to be 500 mL. The capsule was inspected, it was  thickened and the capsulectomy was performed. The capsule that was  adherent to the chest wall was left intact as not to induce undue  trauma. Hemostasis was achieved with monopolar cautery. The wound was  irrigated with normal saline. A 550 mL gel sizer was placed. The  patient was placed in the sitting position and the 400 mL sizer was  noted to be a quite small, so 450 mL was chosen, thus occurring more  satisfactory symmetry.   The capsule was tightened on the right side with  a popcorning technique and the medial

## 2019-07-29 NOTE — PROGRESS NOTES
with a clean paper towel. No baths, hot tubs or soaking of the wound site at this time. Pt voices understanding. F/U in 2 weeks. Call office with concerns or signs of infection. I attest that the patient was seen and examined by me, and concur with the documentation above. I agree with the assessment and the plan outlined. This document is generated, in part, by voice recognition software and thus  syntax and grammatical errors are possible.     Rosi Hilton  9:28 AM  8/2/2019

## 2019-07-30 ENCOUNTER — OFFICE VISIT (OUTPATIENT)
Dept: FAMILY MEDICINE CLINIC | Age: 69
End: 2019-07-30
Payer: MEDICARE

## 2019-07-30 VITALS
WEIGHT: 153 LBS | TEMPERATURE: 98.2 F | DIASTOLIC BLOOD PRESSURE: 82 MMHG | BODY MASS INDEX: 29.88 KG/M2 | SYSTOLIC BLOOD PRESSURE: 128 MMHG

## 2019-07-30 DIAGNOSIS — B37.0 THRUSH: Primary | ICD-10-CM

## 2019-07-30 PROCEDURE — 99213 OFFICE O/P EST LOW 20 MIN: CPT | Performed by: FAMILY MEDICINE

## 2019-07-30 ASSESSMENT — ENCOUNTER SYMPTOMS
GASTROINTESTINAL NEGATIVE: 1
RESPIRATORY NEGATIVE: 1

## 2019-07-30 NOTE — PROGRESS NOTES
19  Name: Paris Barriga    : 1950    Sex: female    Age: 68 y.o. Subjective:  Chief Complaint: Patient is here for throat and  Ears and tongue     Surgery for no temperature sweats chills   silicone breast implants out and new ones in  And  Since with some mouth sore ness    Just done with clinda no lesions in the mouth. Review of Systems   HENT:        HPI. Respiratory: Negative. Cardiovascular: Negative. Gastrointestinal: Negative. Current Outpatient Medications:     nystatin (MYCOSTATIN) 810257 UNIT/ML suspension, Take 5 mLs by mouth 4 times daily for 10 days Retain in mouth as long as possible, Disp: 200 mL, Rfl: 1    clindamycin (CLEOCIN) 300 MG capsule, Take 1 capsule by mouth 3 times daily for 5 days, Disp: 15 capsule, Rfl: 0    ondansetron (ZOFRAN) 4 MG tablet, Take 1 tablet by mouth daily as needed for Nausea or Vomiting, Disp: 12 tablet, Rfl: 1    oxyCODONE-acetaminophen (PERCOCET) 5-325 MG per tablet, Take 1 tablet by mouth every 6 hours as needed for Pain for up to 7 days. , Disp: 15 tablet, Rfl: 0    fluticasone (FLONASE) 50 MCG/ACT nasal spray, 1 spray by Each Nostril route 2 times daily, Disp: , Rfl: 12    levothyroxine (SYNTHROID) 88 MCG tablet, Indications: Friday, Saturday and  One three times a week    Along with a 100 mcg (Patient taking differently: Indications: Friday, Saturday and  One three times a week), Disp: 45 tablet, Rfl: 12    levothyroxine (SYNTHROID) 100 MCG tablet, One four times a week---along with an 88 mcg (Patient taking differently: One four times a week), Disp: 60 tablet, Rfl: 1    FLUoxetine (PROZAC) 10 MG capsule, Take 1 capsule by mouth daily (Patient taking differently: Take 10 mg by mouth nightly ), Disp: 90 capsule, Rfl: 12    pantoprazole (PROTONIX) 40 MG tablet, Take 1 tablet by mouth every morning (before breakfast), Disp: 90 tablet, Rfl: 1    calcium carbonate 600 MG TABS tablet, Take 1 tablet by mouth daily, Disp: , Rfl:     Cyanocobalamin (VITAMIN B-12) 1000 MCG SUBL, Place under the tongue, Disp: , Rfl:   Allergies   Allergen Reactions    Azithromycin      Stomach pains    Doxycycline      Stomach pains    Zanaflex [Tizanidine]      Stomach pains     Social History     Socioeconomic History    Marital status:      Spouse name: Not on file    Number of children: Not on file    Years of education: Not on file    Highest education level: Not on file   Occupational History    Not on file   Social Needs    Financial resource strain: Not on file    Food insecurity:     Worry: Not on file     Inability: Not on file    Transportation needs:     Medical: Not on file     Non-medical: Not on file   Tobacco Use    Smoking status: Never Smoker    Smokeless tobacco: Never Used   Substance and Sexual Activity    Alcohol use: No    Drug use: No    Sexual activity: Not on file   Lifestyle    Physical activity:     Days per week: Not on file     Minutes per session: Not on file    Stress: Not on file   Relationships    Social connections:     Talks on phone: Not on file     Gets together: Not on file     Attends Hindu service: Not on file     Active member of club or organization: Not on file     Attends meetings of clubs or organizations: Not on file     Relationship status: Not on file    Intimate partner violence:     Fear of current or ex partner: Not on file     Emotionally abused: Not on file     Physically abused: Not on file     Forced sexual activity: Not on file   Other Topics Concern    Not on file   Social History Narrative        HYPOTHYROIDISM    [address]  1950 Page #2    RIGHT BREAST CA WITH 13 POSITIVE LYMPH NODES 1998 WITH BILATERAL MASTECTOMY AND    IMPLANTS. ---SEES DR [facility]    Hysterectomy, 10/18/2017    EXCISION GANGLION CYST RIGHT MIDDLE FINGER    PANCREATECTOMY  2007    Whipple 1/3 taken       Vitals:    07/30/19 1307   BP: 128/82   Temp: 98.2 °F (36.8 °C)   Weight: 153 lb (69.4 kg)       Objective:    Physical Exam   HENT:   Mild erythema bilateral cheeks and faint amount of thrush on the back tongue   Cardiovascular: Normal rate and regular rhythm. Pulmonary/Chest: Effort normal and breath sounds normal.       Latesha Fuchs was seen today for pharyngitis, otalgia and other. Diagnoses and all orders for this visit:    Thrush  -     nystatin (MYCOSTATIN) 106130 UNIT/ML suspension; Take 5 mLs by mouth 4 times daily for 10 days Retain in mouth as long as possible        Comments: Prescribed. If not resolved a few days. Notify. Sees her surgeon a few days. A great deal of time spent reviewing medications, diet, exercise, social issues. Also reviewing the chart before entering the room with patient and finishing charting after leaving patient's room. More than half of that time was spent face to face with the patient in counseling and coordinating care. Follow Up: Return if symptoms worsen or fail to improve.      Seen by:  Sophia Jimenes 89, DO

## 2019-08-02 ENCOUNTER — OFFICE VISIT (OUTPATIENT)
Dept: SURGERY | Age: 69
End: 2019-08-02

## 2019-08-02 VITALS
SYSTOLIC BLOOD PRESSURE: 125 MMHG | HEIGHT: 60 IN | OXYGEN SATURATION: 94 % | DIASTOLIC BLOOD PRESSURE: 72 MMHG | HEART RATE: 82 BPM | WEIGHT: 154 LBS | BODY MASS INDEX: 30.23 KG/M2

## 2019-08-02 DIAGNOSIS — Z98.890 HISTORY OF BREAST RECONSTRUCTION: Primary | ICD-10-CM

## 2019-08-02 PROCEDURE — 99024 POSTOP FOLLOW-UP VISIT: CPT | Performed by: PLASTIC SURGERY

## 2019-08-16 ENCOUNTER — OFFICE VISIT (OUTPATIENT)
Dept: SURGERY | Age: 69
End: 2019-08-16

## 2019-08-16 VITALS
SYSTOLIC BLOOD PRESSURE: 134 MMHG | HEIGHT: 60 IN | DIASTOLIC BLOOD PRESSURE: 80 MMHG | OXYGEN SATURATION: 97 % | WEIGHT: 153.8 LBS | TEMPERATURE: 97.8 F | RESPIRATION RATE: 20 BRPM | HEART RATE: 76 BPM | BODY MASS INDEX: 30.19 KG/M2

## 2019-08-16 DIAGNOSIS — Z98.890 HISTORY OF BREAST RECONSTRUCTION: Primary | ICD-10-CM

## 2019-08-16 PROCEDURE — 99024 POSTOP FOLLOW-UP VISIT: CPT | Performed by: PHYSICIAN ASSISTANT

## 2019-08-16 NOTE — PROGRESS NOTES
Subjective: Follow up today from Bilateral removal of intact saline implants. Left capsulectomy. Bilateral replacement of silicone breast implants. The right volume  is 465 and left volume is 560. Laura Mota Denies fever, nausea, vomiting, leg pain or swelling, pain is absent. The pt states she is  taking her antibiotic and continues to wear her surgical bra. She is ambulating at home. She is  wearing her surgical bra at all times. He voices no complaints at this time    Objective:    /80 (Site: Left Upper Arm, Position: Sitting, Cuff Size: Medium Adult)   Pulse 76   Temp 97.8 °F (36.6 °C) (Oral)   Resp 20   Ht 5' (1.524 m)   Wt 153 lb 12.8 oz (69.8 kg)   SpO2 97%   BMI 30.04 kg/m²       Left Breast- Clean, dry, and intact, no drainage. , no signs of infection. Right Breast Clean, dry, and intact, no drainage. , no signs of infection. Optimal symmetry. No evidence of fluid collections. Assessment:    Patient Active Problem List   Diagnosis    Abdominal pain, other specified site    Pancreatitis, acute    Hypothyroidism    Anxiety    Stenosis of cervical spine    Neck pain    Ganglion of flexor tendon sheath of right middle finger    History of breast cancer    Hypertension    Chronic gastritis without bleeding    Primary osteoarthritis of left knee    Diet-controlled diabetes mellitus ([facility] [address]    Post-op pain       Plan:     Status post replacement of bilateral breast implants with silicone breast implants. Okay to begin scar massage at this time. No restrictions in 1 week. Bra of her choice. I educated the patient that if she would like to plan for touchup of her bilateral breast nipple and areolea tattoo she can do this in office. Areola Tattooing Discussion    Patient voices desire for tattooing to create a pigmented areola following nipple reconstruction. She understands that she may do this under local or under sedation.  She understands that there is a risk of massage once incision is completely healed and strong enough to handle the motion (usually 10 - 14 days post operatively).  You use lotion to do the scar massage to allow ease with motion over the scar and prevent friction at the area. Patient had no further Questions. Paper instructions given to patient. Continue with routine primary care follow-ups as well as medical oncology as needed    F/U in 1 year or sooner if she would like to plan for nipple and areole pigment re-tattooing  Call office with concerns or signs of infection. I attest that the patient was seen and examined by me, and concur with the documentation above. I agree with the assessment and the plan outlined. This document is generated, in part, by voice recognition software and thus  syntax and grammatical errors are possible.     Opal Todd  8:56 AM  8/16/2019

## 2019-10-28 RX ORDER — FLUTICASONE PROPIONATE 50 MCG
1 SPRAY, SUSPENSION (ML) NASAL 2 TIMES DAILY
Qty: 1 BOTTLE | Refills: 12 | Status: SHIPPED | OUTPATIENT
Start: 2019-10-28 | End: 2020-01-08

## 2019-11-19 ENCOUNTER — OFFICE VISIT (OUTPATIENT)
Dept: PRIMARY CARE CLINIC | Age: 69
End: 2019-11-19
Payer: MEDICARE

## 2019-11-19 VITALS
OXYGEN SATURATION: 97 % | WEIGHT: 153 LBS | HEIGHT: 60 IN | RESPIRATION RATE: 16 BRPM | DIASTOLIC BLOOD PRESSURE: 78 MMHG | HEART RATE: 82 BPM | SYSTOLIC BLOOD PRESSURE: 134 MMHG | BODY MASS INDEX: 30.04 KG/M2

## 2019-11-19 DIAGNOSIS — M54.50 LUMBAR PAIN: Primary | ICD-10-CM

## 2019-11-19 DIAGNOSIS — M47.816 SPONDYLOSIS OF LUMBAR REGION WITHOUT MYELOPATHY OR RADICULOPATHY: ICD-10-CM

## 2019-11-19 LAB
BILIRUBIN, POC: NORMAL
BLOOD URINE, POC: NORMAL
CLARITY, POC: CLEAR
COLOR, POC: YELLOW
GLUCOSE URINE, POC: NORMAL
KETONES, POC: NORMAL
LEUKOCYTE EST, POC: NORMAL
NITRITE, POC: NORMAL
PH, POC: 7
PROTEIN, POC: NORMAL
SPECIFIC GRAVITY, POC: 1.01
UROBILINOGEN, POC: 0.2

## 2019-11-19 PROCEDURE — 1090F PRES/ABSN URINE INCON ASSESS: CPT | Performed by: FAMILY MEDICINE

## 2019-11-19 PROCEDURE — G8482 FLU IMMUNIZE ORDER/ADMIN: HCPCS | Performed by: FAMILY MEDICINE

## 2019-11-19 PROCEDURE — 99213 OFFICE O/P EST LOW 20 MIN: CPT | Performed by: FAMILY MEDICINE

## 2019-11-19 PROCEDURE — G8417 CALC BMI ABV UP PARAM F/U: HCPCS | Performed by: FAMILY MEDICINE

## 2019-11-19 PROCEDURE — 1123F ACP DISCUSS/DSCN MKR DOCD: CPT | Performed by: FAMILY MEDICINE

## 2019-11-19 PROCEDURE — G8428 CUR MEDS NOT DOCUMENT: HCPCS | Performed by: FAMILY MEDICINE

## 2019-11-19 PROCEDURE — 81002 URINALYSIS NONAUTO W/O SCOPE: CPT | Performed by: FAMILY MEDICINE

## 2019-11-19 PROCEDURE — 96372 THER/PROPH/DIAG INJ SC/IM: CPT | Performed by: FAMILY MEDICINE

## 2019-11-19 PROCEDURE — G8400 PT W/DXA NO RESULTS DOC: HCPCS | Performed by: FAMILY MEDICINE

## 2019-11-19 PROCEDURE — 4040F PNEUMOC VAC/ADMIN/RCVD: CPT | Performed by: FAMILY MEDICINE

## 2019-11-19 PROCEDURE — 1036F TOBACCO NON-USER: CPT | Performed by: FAMILY MEDICINE

## 2019-11-19 PROCEDURE — 3017F COLORECTAL CA SCREEN DOC REV: CPT | Performed by: FAMILY MEDICINE

## 2019-11-19 RX ORDER — BACLOFEN 10 MG/1
10 TABLET ORAL 3 TIMES DAILY
Qty: 30 TABLET | Refills: 0 | Status: SHIPPED | OUTPATIENT
Start: 2019-11-19 | End: 2020-01-08

## 2019-11-19 RX ORDER — PREDNISONE 10 MG/1
TABLET ORAL
Qty: 18 TABLET | Refills: 0 | Status: SHIPPED | OUTPATIENT
Start: 2019-11-19 | End: 2020-01-08

## 2019-11-19 RX ORDER — KETOROLAC TROMETHAMINE 15 MG/ML
15 INJECTION, SOLUTION INTRAMUSCULAR; INTRAVENOUS ONCE
Qty: 1 ML | Refills: 0
Start: 2019-11-19 | End: 2019-11-19

## 2019-11-19 RX ORDER — HYDROCODONE BITARTRATE AND ACETAMINOPHEN 5; 325 MG/1; MG/1
1 TABLET ORAL EVERY 8 HOURS PRN
Qty: 21 TABLET | Refills: 0 | Status: SHIPPED | OUTPATIENT
Start: 2019-11-19 | End: 2019-11-26

## 2019-11-19 RX ORDER — KETOROLAC TROMETHAMINE 30 MG/ML
30 INJECTION, SOLUTION INTRAMUSCULAR; INTRAVENOUS ONCE
Status: COMPLETED | OUTPATIENT
Start: 2019-11-19 | End: 2019-11-19

## 2019-11-19 RX ORDER — METHYLPREDNISOLONE ACETATE 40 MG/ML
40 INJECTION, SUSPENSION INTRA-ARTICULAR; INTRALESIONAL; INTRAMUSCULAR; SOFT TISSUE ONCE
Status: COMPLETED | OUTPATIENT
Start: 2019-11-19 | End: 2019-11-19

## 2019-11-19 RX ADMIN — KETOROLAC TROMETHAMINE 30 MG: 30 INJECTION, SOLUTION INTRAMUSCULAR; INTRAVENOUS at 17:17

## 2019-11-19 RX ADMIN — METHYLPREDNISOLONE ACETATE 40 MG: 40 INJECTION, SUSPENSION INTRA-ARTICULAR; INTRALESIONAL; INTRAMUSCULAR; SOFT TISSUE at 16:00

## 2019-11-20 ASSESSMENT — ENCOUNTER SYMPTOMS
BACK PAIN: 1
EYES NEGATIVE: 1
RESPIRATORY NEGATIVE: 1
GASTROINTESTINAL NEGATIVE: 1
ALLERGIC/IMMUNOLOGIC NEGATIVE: 1

## 2019-11-20 ASSESSMENT — PATIENT HEALTH QUESTIONNAIRE - PHQ9
SUM OF ALL RESPONSES TO PHQ9 QUESTIONS 1 & 2: 0
2. FEELING DOWN, DEPRESSED OR HOPELESS: 0
1. LITTLE INTEREST OR PLEASURE IN DOING THINGS: 0
SUM OF ALL RESPONSES TO PHQ QUESTIONS 1-9: 0
SUM OF ALL RESPONSES TO PHQ QUESTIONS 1-9: 0

## 2019-11-29 ENCOUNTER — HOSPITAL ENCOUNTER (OUTPATIENT)
Age: 69
Discharge: HOME | End: 2019-12-01
Payer: MEDICARE

## 2019-11-29 PROCEDURE — 87088 URINE BACTERIA CULTURE: CPT

## 2019-12-01 LAB — URINE CULTURE, ROUTINE: NORMAL

## 2020-01-03 ENCOUNTER — HOSPITAL ENCOUNTER (OUTPATIENT)
Age: 70
Discharge: HOME | End: 2020-01-05
Payer: MEDICARE

## 2020-01-03 LAB
ALBUMIN SERPL-MCNC: 4.2 G/DL (ref 3.5–5.2)
ALP BLD-CCNC: 100 U/L (ref 35–104)
ALT SERPL-CCNC: 13 U/L (ref 0–32)
ANION GAP SERPL CALCULATED.3IONS-SCNC: 15 MMOL/L (ref 7–16)
AST SERPL-CCNC: 16 U/L (ref 0–31)
BACTERIA: ABNORMAL /HPF
BASOPHILS ABSOLUTE: 0.05 E9/L (ref 0–0.2)
BASOPHILS RELATIVE PERCENT: 0.8 % (ref 0–2)
BILIRUB SERPL-MCNC: 0.5 MG/DL (ref 0–1.2)
BILIRUBIN URINE: NEGATIVE
BLOOD, URINE: NEGATIVE
BUN BLDV-MCNC: 13 MG/DL (ref 8–23)
CALCIUM SERPL-MCNC: 9.5 MG/DL (ref 8.6–10.2)
CHLORIDE BLD-SCNC: 104 MMOL/L (ref 98–107)
CHOLESTEROL, TOTAL: 184 MG/DL (ref 0–199)
CLARITY: CLEAR
CO2: 26 MMOL/L (ref 22–29)
COLOR: YELLOW
CREAT SERPL-MCNC: 0.9 MG/DL (ref 0.5–1)
EOSINOPHILS ABSOLUTE: 0.15 E9/L (ref 0.05–0.5)
EOSINOPHILS RELATIVE PERCENT: 2.3 % (ref 0–6)
GFR AFRICAN AMERICAN: >60
GFR NON-AFRICAN AMERICAN: >60 ML/MIN/1.73
GLUCOSE BLD-MCNC: 101 MG/DL (ref 74–99)
GLUCOSE URINE: NEGATIVE MG/DL
HBA1C MFR BLD: 6.1 % (ref 4–5.6)
HCT VFR BLD CALC: 41.4 % (ref 34–48)
HDLC SERPL-MCNC: 56 MG/DL
HEMOGLOBIN: 12.7 G/DL (ref 11.5–15.5)
IMMATURE GRANULOCYTES #: 0.04 E9/L
IMMATURE GRANULOCYTES %: 0.6 % (ref 0–5)
KETONES, URINE: NEGATIVE MG/DL
LDL CHOLESTEROL CALCULATED: 107 MG/DL (ref 0–99)
LEUKOCYTE ESTERASE, URINE: ABNORMAL
LYMPHOCYTES ABSOLUTE: 1.64 E9/L (ref 1.5–4)
LYMPHOCYTES RELATIVE PERCENT: 25.5 % (ref 20–42)
MCH RBC QN AUTO: 27.5 PG (ref 26–35)
MCHC RBC AUTO-ENTMCNC: 30.7 % (ref 32–34.5)
MCV RBC AUTO: 89.8 FL (ref 80–99.9)
MONOCYTES ABSOLUTE: 0.59 E9/L (ref 0.1–0.95)
MONOCYTES RELATIVE PERCENT: 9.2 % (ref 2–12)
NEUTROPHILS ABSOLUTE: 3.96 E9/L (ref 1.8–7.3)
NEUTROPHILS RELATIVE PERCENT: 61.6 % (ref 43–80)
NITRITE, URINE: NEGATIVE
PDW BLD-RTO: 13.7 FL (ref 11.5–15)
PH UA: 6 (ref 5–9)
PLATELET # BLD: 288 E9/L (ref 130–450)
PMV BLD AUTO: 11.2 FL (ref 7–12)
POTASSIUM SERPL-SCNC: 5.4 MMOL/L (ref 3.5–5)
PROTEIN UA: NEGATIVE MG/DL
RBC # BLD: 4.61 E12/L (ref 3.5–5.5)
RBC UA: ABNORMAL /HPF (ref 0–2)
SODIUM BLD-SCNC: 145 MMOL/L (ref 132–146)
SPECIFIC GRAVITY UA: 1.02 (ref 1–1.03)
T4 TOTAL: 8.4 MCG/DL (ref 4.5–11.7)
TOTAL PROTEIN: 6.6 G/DL (ref 6.4–8.3)
TRIGL SERPL-MCNC: 103 MG/DL (ref 0–149)
TSH SERPL DL<=0.05 MIU/L-ACNC: 2.26 UIU/ML (ref 0.27–4.2)
UROBILINOGEN, URINE: 0.2 E.U./DL
VITAMIN D 25-HYDROXY: 24 NG/ML (ref 30–100)
VLDLC SERPL CALC-MCNC: 21 MG/DL
WBC # BLD: 6.4 E9/L (ref 4.5–11.5)
WBC UA: ABNORMAL /HPF (ref 0–5)
YEAST: ABNORMAL

## 2020-01-03 PROCEDURE — 85025 COMPLETE CBC W/AUTO DIFF WBC: CPT

## 2020-01-03 PROCEDURE — 84443 ASSAY THYROID STIM HORMONE: CPT

## 2020-01-03 PROCEDURE — 82306 VITAMIN D 25 HYDROXY: CPT

## 2020-01-03 PROCEDURE — 80053 COMPREHEN METABOLIC PANEL: CPT

## 2020-01-03 PROCEDURE — 81001 URINALYSIS AUTO W/SCOPE: CPT

## 2020-01-03 PROCEDURE — 84436 ASSAY OF TOTAL THYROXINE: CPT

## 2020-01-03 PROCEDURE — 83036 HEMOGLOBIN GLYCOSYLATED A1C: CPT

## 2020-01-03 PROCEDURE — 36415 COLL VENOUS BLD VENIPUNCTURE: CPT

## 2020-01-03 PROCEDURE — 87088 URINE BACTERIA CULTURE: CPT

## 2020-01-03 PROCEDURE — 80061 LIPID PANEL: CPT

## 2020-01-05 LAB — URINE CULTURE, ROUTINE: NORMAL

## 2020-01-08 ENCOUNTER — OFFICE VISIT (OUTPATIENT)
Dept: PRIMARY CARE CLINIC | Age: 70
End: 2020-01-08
Payer: MEDICARE

## 2020-01-08 VITALS
WEIGHT: 153 LBS | BODY MASS INDEX: 30.04 KG/M2 | TEMPERATURE: 98.1 F | HEIGHT: 60 IN | SYSTOLIC BLOOD PRESSURE: 128 MMHG | DIASTOLIC BLOOD PRESSURE: 82 MMHG

## 2020-01-08 PROBLEM — R73.03 PRE-DIABETES: Status: ACTIVE | Noted: 2020-01-08

## 2020-01-08 PROBLEM — K29.50 CHRONIC GASTRITIS WITHOUT BLEEDING: Chronic | Status: ACTIVE | Noted: 2019-07-08

## 2020-01-08 PROBLEM — M48.02 STENOSIS OF CERVICAL SPINE: Chronic | Status: ACTIVE | Noted: 2017-04-28

## 2020-01-08 PROBLEM — I10 ESSENTIAL HYPERTENSION: Chronic | Status: ACTIVE | Noted: 2019-07-08

## 2020-01-08 PROBLEM — Z85.3 HISTORY OF BREAST CANCER: Chronic | Status: ACTIVE | Noted: 2018-06-08

## 2020-01-08 PROBLEM — M47.816 SPONDYLOSIS OF LUMBAR REGION WITHOUT MYELOPATHY OR RADICULOPATHY: Chronic | Status: ACTIVE | Noted: 2020-01-08

## 2020-01-08 PROBLEM — K21.9 GASTROESOPHAGEAL REFLUX DISEASE WITHOUT ESOPHAGITIS: Chronic | Status: ACTIVE | Noted: 2020-01-08

## 2020-01-08 PROBLEM — K21.9 GASTROESOPHAGEAL REFLUX DISEASE WITHOUT ESOPHAGITIS: Status: ACTIVE | Noted: 2020-01-08

## 2020-01-08 PROBLEM — R73.03 PRE-DIABETES: Chronic | Status: ACTIVE | Noted: 2020-01-08

## 2020-01-08 PROBLEM — M17.12 PRIMARY OSTEOARTHRITIS OF LEFT KNEE: Chronic | Status: ACTIVE | Noted: 2019-07-08

## 2020-01-08 PROBLEM — M47.816 SPONDYLOSIS OF LUMBAR REGION WITHOUT MYELOPATHY OR RADICULOPATHY: Status: ACTIVE | Noted: 2020-01-08

## 2020-01-08 PROCEDURE — 4040F PNEUMOC VAC/ADMIN/RCVD: CPT | Performed by: FAMILY MEDICINE

## 2020-01-08 PROCEDURE — 3017F COLORECTAL CA SCREEN DOC REV: CPT | Performed by: FAMILY MEDICINE

## 2020-01-08 PROCEDURE — G8417 CALC BMI ABV UP PARAM F/U: HCPCS | Performed by: FAMILY MEDICINE

## 2020-01-08 PROCEDURE — 1036F TOBACCO NON-USER: CPT | Performed by: FAMILY MEDICINE

## 2020-01-08 PROCEDURE — G8400 PT W/DXA NO RESULTS DOC: HCPCS | Performed by: FAMILY MEDICINE

## 2020-01-08 PROCEDURE — 1090F PRES/ABSN URINE INCON ASSESS: CPT | Performed by: FAMILY MEDICINE

## 2020-01-08 PROCEDURE — 99214 OFFICE O/P EST MOD 30 MIN: CPT | Performed by: FAMILY MEDICINE

## 2020-01-08 PROCEDURE — G8428 CUR MEDS NOT DOCUMENT: HCPCS | Performed by: FAMILY MEDICINE

## 2020-01-08 PROCEDURE — 1123F ACP DISCUSS/DSCN MKR DOCD: CPT | Performed by: FAMILY MEDICINE

## 2020-01-08 PROCEDURE — G8482 FLU IMMUNIZE ORDER/ADMIN: HCPCS | Performed by: FAMILY MEDICINE

## 2020-01-08 ASSESSMENT — ENCOUNTER SYMPTOMS
ALLERGIC/IMMUNOLOGIC NEGATIVE: 1
RESPIRATORY NEGATIVE: 1
EYES NEGATIVE: 1
BACK PAIN: 1
GASTROINTESTINAL NEGATIVE: 1

## 2020-01-08 ASSESSMENT — PATIENT HEALTH QUESTIONNAIRE - PHQ9
SUM OF ALL RESPONSES TO PHQ9 QUESTIONS 1 & 2: 0
SUM OF ALL RESPONSES TO PHQ QUESTIONS 1-9: 0
SUM OF ALL RESPONSES TO PHQ QUESTIONS 1-9: 0
2. FEELING DOWN, DEPRESSED OR HOPELESS: 0
1. LITTLE INTEREST OR PLEASURE IN DOING THINGS: 0

## 2020-01-08 NOTE — PROGRESS NOTES
Bilateral 2012   [address]    CERVICAL FUSION  1997    CHOLECYSTECTOMY  2007    with Luis Manuel Rob  2015    Dr. Meredith Reading    to repair muscle     HERNIA REPAIR Right years ago    inguinal    HYSTERECTOMY  1978    OTHER SURGICAL HISTORY Right 10/18/2017    EXCISION GANGLION CYST RIGHT MIDDLE FINGER    PANCREATECTOMY  2007    Whipple 1/3 taken       Vitals:    01/08/20 0825   BP: 128/82   Temp: 98.1 °F (36.7 °C)   Weight: 153 lb (69.4 kg)   Height: 5' (1.524 m)       Objective:    Physical Exam  Vitals signs reviewed. Constitutional:       Appearance: She is well-developed. HENT:      Head: Normocephalic. Eyes:      Pupils: Pupils are equal, round, and reactive to light. Neck:      Musculoskeletal: Normal range of motion. Cardiovascular:      Rate and Rhythm: Normal rate and regular rhythm. Pulmonary:      Effort: Pulmonary effort is normal.      Breath sounds: Normal breath sounds. Abdominal:      Palpations: Abdomen is soft. Musculoskeletal: Normal range of motion. Comments: Para lumbar spasm   Skin:     General: Skin is warm. Neurological:      Mental Status: She is alert and oriented to person, place, and time. Psychiatric:         Behavior: Behavior normal.         Sade Villa was seen today for results. Diagnoses and all orders for this visit:    Acquired hypothyroidism    Anxiety    Gastroesophageal reflux disease without esophagitis  -     External Referral To General Surgery    Spondylosis of lumbar region without myelopathy or radiculopathy    Pre-diabetes        Comments: low sguar  Diet   Offer   Pain doc  And ref    appt  E van   Ck bp home    thyroidmed without food   A great deal of time spent reviewing medications, diet, exercise, social issues. Also reviewing the chart before entering the room with patient and finishing charting after leaving patient's room.  More than half of that time was spent face to face

## 2020-01-28 ENCOUNTER — HOSPITAL ENCOUNTER (OUTPATIENT)
Age: 70
Discharge: HOME | End: 2020-01-30

## 2020-01-28 PROCEDURE — 88305 TISSUE EXAM BY PATHOLOGIST: CPT

## 2020-01-28 PROCEDURE — 88342 IMHCHEM/IMCYTCHM 1ST ANTB: CPT

## 2020-03-30 RX ORDER — LEVOTHYROXINE SODIUM 88 UG/1
TABLET ORAL
Qty: 36 TABLET | Refills: 3 | Status: SHIPPED
Start: 2020-03-30 | End: 2020-07-08 | Stop reason: SDUPTHER

## 2020-03-30 RX ORDER — FLUOXETINE 10 MG/1
10 CAPSULE ORAL DAILY
Qty: 90 CAPSULE | Refills: 3 | Status: SHIPPED
Start: 2020-03-30 | End: 2021-01-11 | Stop reason: SDUPTHER

## 2020-03-30 RX ORDER — LEVOTHYROXINE SODIUM 0.1 MG/1
TABLET ORAL
Qty: 48 TABLET | Refills: 3 | Status: SHIPPED
Start: 2020-03-30 | End: 2020-07-08 | Stop reason: SDUPTHER

## 2020-07-01 ENCOUNTER — HOSPITAL ENCOUNTER (OUTPATIENT)
Age: 70
Discharge: HOME | End: 2020-07-03
Payer: MEDICARE

## 2020-07-01 LAB
ALBUMIN SERPL-MCNC: 4.1 G/DL (ref 3.5–5.2)
ALP BLD-CCNC: 111 U/L (ref 35–104)
ALT SERPL-CCNC: 12 U/L (ref 0–32)
ANION GAP SERPL CALCULATED.3IONS-SCNC: 13 MMOL/L (ref 7–16)
AST SERPL-CCNC: 17 U/L (ref 0–31)
BASOPHILS ABSOLUTE: 0.03 E9/L (ref 0–0.2)
BASOPHILS RELATIVE PERCENT: 0.5 % (ref 0–2)
BILIRUB SERPL-MCNC: 0.5 MG/DL (ref 0–1.2)
BILIRUBIN URINE: NEGATIVE
BLOOD, URINE: NEGATIVE
BUN BLDV-MCNC: 11 MG/DL (ref 8–23)
CALCIUM SERPL-MCNC: 9.4 MG/DL (ref 8.6–10.2)
CHLORIDE BLD-SCNC: 103 MMOL/L (ref 98–107)
CHOLESTEROL, TOTAL: 172 MG/DL (ref 0–199)
CLARITY: CLEAR
CO2: 26 MMOL/L (ref 22–29)
COLOR: YELLOW
CREAT SERPL-MCNC: 0.9 MG/DL (ref 0.5–1)
EOSINOPHILS ABSOLUTE: 0.26 E9/L (ref 0.05–0.5)
EOSINOPHILS RELATIVE PERCENT: 4.4 % (ref 0–6)
GFR AFRICAN AMERICAN: >60
GFR NON-AFRICAN AMERICAN: >60 ML/MIN/1.73
GLUCOSE BLD-MCNC: 94 MG/DL (ref 74–99)
GLUCOSE URINE: NEGATIVE MG/DL
HBA1C MFR BLD: 5.7 % (ref 4–5.6)
HCT VFR BLD CALC: 41 % (ref 34–48)
HDLC SERPL-MCNC: 51 MG/DL
HEMOGLOBIN: 12.9 G/DL (ref 11.5–15.5)
IMMATURE GRANULOCYTES #: 0.02 E9/L
IMMATURE GRANULOCYTES %: 0.3 % (ref 0–5)
KETONES, URINE: NEGATIVE MG/DL
LDL CHOLESTEROL CALCULATED: 99 MG/DL (ref 0–99)
LEUKOCYTE ESTERASE, URINE: NEGATIVE
LYMPHOCYTES ABSOLUTE: 1.53 E9/L (ref 1.5–4)
LYMPHOCYTES RELATIVE PERCENT: 26.1 % (ref 20–42)
MCH RBC QN AUTO: 28.1 PG (ref 26–35)
MCHC RBC AUTO-ENTMCNC: 31.5 % (ref 32–34.5)
MCV RBC AUTO: 89.3 FL (ref 80–99.9)
MONOCYTES ABSOLUTE: 0.56 E9/L (ref 0.1–0.95)
MONOCYTES RELATIVE PERCENT: 9.5 % (ref 2–12)
NEUTROPHILS ABSOLUTE: 3.47 E9/L (ref 1.8–7.3)
NEUTROPHILS RELATIVE PERCENT: 59.2 % (ref 43–80)
NITRITE, URINE: NEGATIVE
PDW BLD-RTO: 12.9 FL (ref 11.5–15)
PH UA: 7 (ref 5–9)
PLATELET # BLD: 307 E9/L (ref 130–450)
PMV BLD AUTO: 11.2 FL (ref 7–12)
POTASSIUM SERPL-SCNC: 5.5 MMOL/L (ref 3.5–5)
PROTEIN UA: NEGATIVE MG/DL
RBC # BLD: 4.59 E12/L (ref 3.5–5.5)
SODIUM BLD-SCNC: 142 MMOL/L (ref 132–146)
SPECIFIC GRAVITY UA: 1.01 (ref 1–1.03)
T4 TOTAL: 10 MCG/DL (ref 4.5–11.7)
TOTAL PROTEIN: 6.7 G/DL (ref 6.4–8.3)
TRIGL SERPL-MCNC: 108 MG/DL (ref 0–149)
TSH SERPL DL<=0.05 MIU/L-ACNC: 0.48 UIU/ML (ref 0.27–4.2)
UROBILINOGEN, URINE: 0.2 E.U./DL
VITAMIN D 25-HYDROXY: 35 NG/ML (ref 30–100)
VLDLC SERPL CALC-MCNC: 22 MG/DL
WBC # BLD: 5.9 E9/L (ref 4.5–11.5)

## 2020-07-01 PROCEDURE — 82306 VITAMIN D 25 HYDROXY: CPT

## 2020-07-01 PROCEDURE — 81003 URINALYSIS AUTO W/O SCOPE: CPT

## 2020-07-01 PROCEDURE — 36415 COLL VENOUS BLD VENIPUNCTURE: CPT

## 2020-07-01 PROCEDURE — 83036 HEMOGLOBIN GLYCOSYLATED A1C: CPT

## 2020-07-01 PROCEDURE — 87088 URINE BACTERIA CULTURE: CPT

## 2020-07-01 PROCEDURE — 84443 ASSAY THYROID STIM HORMONE: CPT

## 2020-07-01 PROCEDURE — 80061 LIPID PANEL: CPT

## 2020-07-01 PROCEDURE — 85025 COMPLETE CBC W/AUTO DIFF WBC: CPT

## 2020-07-01 PROCEDURE — 84436 ASSAY OF TOTAL THYROXINE: CPT

## 2020-07-01 PROCEDURE — 80053 COMPREHEN METABOLIC PANEL: CPT

## 2020-07-02 LAB — URINE CULTURE, ROUTINE: NORMAL

## 2020-07-08 ENCOUNTER — OFFICE VISIT (OUTPATIENT)
Dept: PRIMARY CARE CLINIC | Age: 70
End: 2020-07-08
Payer: MEDICARE

## 2020-07-08 VITALS
DIASTOLIC BLOOD PRESSURE: 78 MMHG | BODY MASS INDEX: 27.73 KG/M2 | SYSTOLIC BLOOD PRESSURE: 132 MMHG | WEIGHT: 142 LBS | TEMPERATURE: 97.4 F | OXYGEN SATURATION: 96 % | HEART RATE: 88 BPM

## 2020-07-08 PROBLEM — R00.2 PALPITATIONS: Chronic | Status: ACTIVE | Noted: 2020-07-08

## 2020-07-08 PROBLEM — R00.2 PALPITATIONS: Status: ACTIVE | Noted: 2020-07-08

## 2020-07-08 PROCEDURE — G8417 CALC BMI ABV UP PARAM F/U: HCPCS | Performed by: FAMILY MEDICINE

## 2020-07-08 PROCEDURE — 1090F PRES/ABSN URINE INCON ASSESS: CPT | Performed by: FAMILY MEDICINE

## 2020-07-08 PROCEDURE — 1123F ACP DISCUSS/DSCN MKR DOCD: CPT | Performed by: FAMILY MEDICINE

## 2020-07-08 PROCEDURE — G8428 CUR MEDS NOT DOCUMENT: HCPCS | Performed by: FAMILY MEDICINE

## 2020-07-08 PROCEDURE — 1036F TOBACCO NON-USER: CPT | Performed by: FAMILY MEDICINE

## 2020-07-08 PROCEDURE — 4040F PNEUMOC VAC/ADMIN/RCVD: CPT | Performed by: FAMILY MEDICINE

## 2020-07-08 PROCEDURE — 99214 OFFICE O/P EST MOD 30 MIN: CPT | Performed by: FAMILY MEDICINE

## 2020-07-08 PROCEDURE — 3017F COLORECTAL CA SCREEN DOC REV: CPT | Performed by: FAMILY MEDICINE

## 2020-07-08 PROCEDURE — G8400 PT W/DXA NO RESULTS DOC: HCPCS | Performed by: FAMILY MEDICINE

## 2020-07-08 RX ORDER — LEVOTHYROXINE SODIUM 0.1 MG/1
TABLET ORAL
Qty: 50 TABLET | Refills: 12 | Status: SHIPPED
Start: 2020-07-08 | End: 2021-01-11 | Stop reason: SDUPTHER

## 2020-07-08 RX ORDER — LEVOTHYROXINE SODIUM 88 UG/1
TABLET ORAL
Qty: 50 TABLET | Refills: 12 | Status: SHIPPED
Start: 2020-07-08 | End: 2021-01-11 | Stop reason: SDUPTHER

## 2020-07-08 ASSESSMENT — PATIENT HEALTH QUESTIONNAIRE - PHQ9
SUM OF ALL RESPONSES TO PHQ QUESTIONS 1-9: 0
2. FEELING DOWN, DEPRESSED OR HOPELESS: 0
1. LITTLE INTEREST OR PLEASURE IN DOING THINGS: 0
SUM OF ALL RESPONSES TO PHQ9 QUESTIONS 1 & 2: 0
SUM OF ALL RESPONSES TO PHQ QUESTIONS 1-9: 0

## 2020-07-08 ASSESSMENT — ENCOUNTER SYMPTOMS
GASTROINTESTINAL NEGATIVE: 1
RESPIRATORY NEGATIVE: 1
ALLERGIC/IMMUNOLOGIC NEGATIVE: 1
EYES NEGATIVE: 1

## 2020-07-08 NOTE — PROGRESS NOTES
20  Name: Blanca Bartlett    : 1950    Sex: female    Age: 65 y.o. Subjective:  Chief Complaint: Patient is here for  6 mock  Re   Thyroid  Lab  anx        Stress with  bowel resection with dr Lima Giraldo           In hosp 35 days  Chol ok  ghb  Dec    Down 11 lbs  Palpitations for  The last several weeks       No  cp      Review of Systems   Constitutional: Negative. HENT: Negative. Eyes: Negative. Respiratory: Negative. Cardiovascular: Negative. Gastrointestinal: Negative. Endocrine: Negative. Genitourinary: Negative. Musculoskeletal: Negative. Skin: Negative. Allergic/Immunologic: Negative. Neurological: Negative. Hematological: Negative. Psychiatric/Behavioral: Negative.           Current Outpatient Medications:     levothyroxine (SYNTHROID) 100 MCG tablet, One table four times a week, Disp: 50 tablet, Rfl: 12    levothyroxine (SYNTHROID) 88 MCG tablet, Indications: Friday, Saturday and  One tablet four times a week, Disp: 50 tablet, Rfl: 12    FLUoxetine (PROZAC) 10 MG capsule, Take 1 capsule by mouth daily, Disp: 90 capsule, Rfl: 3    calcium carbonate 600 MG TABS tablet, Take 1 tablet by mouth daily, Disp: , Rfl:     Cyanocobalamin (VITAMIN B-12) 1000 MCG SUBL, Place under the tongue, Disp: , Rfl:   Allergies   Allergen Reactions    Azithromycin      Stomach pains    Doxycycline      Stomach pains    Zanaflex [Tizanidine]      Stomach pains     Social History     Socioeconomic History    Marital status:      Spouse name: Not on file    Number of children: Not on file    Years of education: Not on file    Highest education level: Not on file   Occupational History    Not on file   Social Needs    Financial resource strain: Not on file    Food insecurity     Worry: Not on file     Inability: Not on file    Transportation needs     Medical: Not on file     Non-medical: Not on file   Tobacco Use    Smoking status: Never Smoker    Smokeless tobacco: Never Used   Substance and Sexual Activity    Alcohol use: No    Drug use: No    Sexual activity: Not on file   Lifestyle    Physical activity     Days per week: Not on file     Minutes per session: Not on file    Stress: Not on file   Relationships    Social connections     Talks on phone: Not on file     Gets together: Not on file     Attends Hindu service: Not on file     Active member of club or organization: Not on file     Attends meetings of clubs or organizations: Not on file     Relationship status: Not on file    Intimate partner violence     Fear of current or ex partner: Not on file     Emotionally abused: Not on file     Physically abused: Not on file     Forced sexual activity: Not on file   Other Topics Concern    Not on file   Social History Narrative        HYPOTHYROIDISM    DEPRESSION [address] [facility] [address] but later found not to be cancer    Sivakumar Worley  1950 Page #2    RIGHT BREAST CA WITH 13 POSITIVE LYMPH NODES 1998 WITH BILATERAL MASTECTOMY AND    IMPLANTS. ---SEES DR ALBERT    Hysterectomy, left 1 ovary    right inguinal hernia repair    cataracts with IOL bilateral    FAMILY HISTORY OF COLON CA, DIABETES    DIET-CONTROLLED DIABETES    ELEVATED LIVER FUNCTION    HYPERLIPIDEMIA, ON STATINS IN THE PAST    LAST A1C 6.0    LAST CHOLESTEROL 231    CERV DISC OR DR ALBERT ---    ALLERGIES IN THE PAST. SAW DR. 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 Dafne Bains, THE    DENTIST    COLONOSCOPY ---DR RIVER---DIFFICULT TO PASS--PT REFUSES ANY MORE    MRI -16 WITH CERVICAL MOD SPINAL STENOSIS---EVAL DR GEMMA--- PT CANCELLED    SHOT    USE TO OTHER SURGICAL HISTORY Right 10/18/2017    EXCISION GANGLION CYST RIGHT MIDDLE FINGER    PANCREATECTOMY  2007    Whipple 1/3 taken       Vitals:    07/08/20 0827   BP: 132/78   Pulse: 88   Temp: 97.4 °F (36.3 °C)   SpO2: 96%   Weight: 142 lb (64.4 kg)       Objective:    Physical Exam  Vitals signs reviewed. Constitutional:       Appearance: She is well-developed. HENT:      Head: Normocephalic. Eyes:      Pupils: Pupils are equal, round, and reactive to light. Neck:      Musculoskeletal: Normal range of motion. Cardiovascular:      Rate and Rhythm: Normal rate and regular rhythm. Pulmonary:      Effort: Pulmonary effort is normal.      Breath sounds: Normal breath sounds. Abdominal:      Palpations: Abdomen is soft. Musculoskeletal: Normal range of motion. Skin:     General: Skin is warm. Neurological:      Mental Status: She is alert and oriented to person, place, and time. Psychiatric:         Behavior: Behavior normal.         Sharon Urbano was seen today for discuss labs. Diagnoses and all orders for this visit:    Acquired hypothyroidism  -     levothyroxine (SYNTHROID) 100 MCG tablet; One table four times a week  -     levothyroxine (SYNTHROID) 88 MCG tablet; Indications: Friday, Saturday and Sunday One tablet four times a week  -     T4; Future  -     TSH without Reflex; Future    Gastroesophageal reflux disease without esophagitis    Pre-diabetes  -     CBC Auto Differential; Future  -     Comprehensive Metabolic Panel; Future  -     Hemoglobin A1C; Future  -     Lipid Panel; Future  -     T4; Future  -     TSH without Reflex; Future  -     Urinalysis; Future  -     Vitamin D 25 Hydroxy; Future    Anxiety    Palpitations  -     Chanda Brooks DO, Cardiology, [place]    Age-related osteoporosis without current pathological fracture  -     DEXA Bone Density 2 Sites; Future  -     Vitamin D 25 Hydroxy;  Future    Mixed hyperlipidemia  -     CBC Auto Differential; Future  - Comprehensive Metabolic Panel; Future  -     Hemoglobin A1C; Future  -     Lipid Panel; Future  -     T4; Future  -     TSH without Reflex; Future  -     Urinalysis; Future  -     Vitamin D 25 Hydroxy; Future        Comments: appt  Cardio ---  Diet  exer hm issues  lsoe wt    Dec sytnrhoid    Alt    Dec  88---4-days and 100--4 days  . A great deal of time spent reviewing medications, diet, exercise, social issues. Also reviewing the chart before entering the room with patient and finishing charting after leaving patient's room. More than half of that time was spent face to face with the patient in counseling and coordinating care.       Follow Up: Return in about 6 months (around 1/8/2021) for lab before    see  referral.     Seen by:  Ulysses Favorite, DO

## 2020-07-14 DIAGNOSIS — M81.0 AGE-RELATED OSTEOPOROSIS WITHOUT CURRENT PATHOLOGICAL FRACTURE: ICD-10-CM

## 2020-07-15 ENCOUNTER — TELEPHONE (OUTPATIENT)
Dept: ADMINISTRATIVE | Age: 70
End: 2020-07-15

## 2020-07-16 ENCOUNTER — TELEPHONE (OUTPATIENT)
Dept: SURGERY | Age: 70
End: 2020-07-16

## 2020-07-16 NOTE — TELEPHONE ENCOUNTER
Patient scheduled for the [facility] office 8/14/2020 called to change location to [place] office patient stated   does not have any problems at this time will call the office if needed, appointment  one year check breast recon.

## 2020-07-16 NOTE — TELEPHONE ENCOUNTER
Patient Appointment Form:      PCP: Dr. Tram Bolanos  Referring: same    Has the Patient:    Seen a Cardiologist? yes    date:?   Physician:Dr. [facility]BOTHAM [facility]  location:[facility]    Had a heart catheterization? no    Had heart surgery? no    Had a stress test or nuclear stress test? yes   date: ?   facility name:  ?    Had an echocardiogram? yes   date: 9/18/17   facility name:  [facility]    Had a vascular ultrasound? no    Had a 24/48 heart monitor or extended cardiac event monitor? no    Had recent blood work in the last 6 months? yes    date: 7/8/20    ordering physician: Dr. Catracho Ledesma    Had a pacemaker/ICD/ILR implant? no    Seen an Electrophysiologist? no        Will send records via: in [address] [place] [address]      Date & time of appointment:  8/4/20 @ 8:00

## 2020-07-28 ENCOUNTER — TELEPHONE (OUTPATIENT)
Dept: SURGERY | Age: 70
End: 2020-07-28

## 2020-08-21 ENCOUNTER — OFFICE VISIT (OUTPATIENT)
Dept: PRIMARY CARE CLINIC | Age: 70
End: 2020-08-21
Payer: MEDICARE

## 2020-08-21 VITALS
BODY MASS INDEX: 28.12 KG/M2 | DIASTOLIC BLOOD PRESSURE: 78 MMHG | TEMPERATURE: 97.7 F | WEIGHT: 144 LBS | SYSTOLIC BLOOD PRESSURE: 128 MMHG

## 2020-08-21 PROBLEM — L30.9 DERMATITIS: Status: ACTIVE | Noted: 2020-08-21

## 2020-08-21 PROBLEM — H60.61 CHRONIC OTITIS EXTERNA OF RIGHT EAR: Status: ACTIVE | Noted: 2020-08-21

## 2020-08-21 PROCEDURE — G8417 CALC BMI ABV UP PARAM F/U: HCPCS | Performed by: FAMILY MEDICINE

## 2020-08-21 PROCEDURE — 4040F PNEUMOC VAC/ADMIN/RCVD: CPT | Performed by: FAMILY MEDICINE

## 2020-08-21 PROCEDURE — G8427 DOCREV CUR MEDS BY ELIG CLIN: HCPCS | Performed by: FAMILY MEDICINE

## 2020-08-21 PROCEDURE — 1090F PRES/ABSN URINE INCON ASSESS: CPT | Performed by: FAMILY MEDICINE

## 2020-08-21 PROCEDURE — 3017F COLORECTAL CA SCREEN DOC REV: CPT | Performed by: FAMILY MEDICINE

## 2020-08-21 PROCEDURE — G8399 PT W/DXA RESULTS DOCUMENT: HCPCS | Performed by: FAMILY MEDICINE

## 2020-08-21 PROCEDURE — 1123F ACP DISCUSS/DSCN MKR DOCD: CPT | Performed by: FAMILY MEDICINE

## 2020-08-21 PROCEDURE — 1036F TOBACCO NON-USER: CPT | Performed by: FAMILY MEDICINE

## 2020-08-21 PROCEDURE — 99213 OFFICE O/P EST LOW 20 MIN: CPT | Performed by: FAMILY MEDICINE

## 2020-08-21 RX ORDER — CEPHALEXIN 500 MG/1
500 CAPSULE ORAL 2 TIMES DAILY
Qty: 14 CAPSULE | Refills: 0 | Status: SHIPPED
Start: 2020-08-21 | End: 2020-08-27

## 2020-08-21 ASSESSMENT — ENCOUNTER SYMPTOMS
RESPIRATORY NEGATIVE: 1
ALLERGIC/IMMUNOLOGIC NEGATIVE: 1
GASTROINTESTINAL NEGATIVE: 1
EYES NEGATIVE: 1

## 2020-08-21 ASSESSMENT — PATIENT HEALTH QUESTIONNAIRE - PHQ9
SUM OF ALL RESPONSES TO PHQ QUESTIONS 1-9: 0
1. LITTLE INTEREST OR PLEASURE IN DOING THINGS: 0
2. FEELING DOWN, DEPRESSED OR HOPELESS: 0
SUM OF ALL RESPONSES TO PHQ9 QUESTIONS 1 & 2: 0
SUM OF ALL RESPONSES TO PHQ QUESTIONS 1-9: 0

## 2020-08-21 NOTE — PROGRESS NOTES
20  Name: Violeta Barnard    : 1950    Sex: female    Age: 65 y.o. Subjective:  Chief Complaint: Patient is here for right ear pain     Right ear ache   For  Few days     She  Thinks form her  I p ad buds  She got benjamin  Aide  yest   Temp     Doc said red   yest        Review of Systems   Constitutional: Negative. HENT: Positive for ear pain. Eyes: Negative. Respiratory: Negative. Cardiovascular: Negative. Gastrointestinal: Negative. Endocrine: Negative. Genitourinary: Negative. Musculoskeletal: Negative. Skin: Negative. Allergic/Immunologic: Negative. Neurological: Negative. Hematological: Negative. Psychiatric/Behavioral: Negative. Current Outpatient Medications:     mupirocin (BACTROBAN) 2 % ointment, Apply 3 times daily. , Disp: 1 Tube, Rfl: 2    cephALEXin (KEFLEX) 500 MG capsule, Take 1 capsule by mouth 2 times daily for 7 days, Disp: 14 capsule, Rfl: 0    levothyroxine (SYNTHROID) 100 MCG tablet, One table four times a week, Disp: 50 tablet, Rfl: 12    levothyroxine (SYNTHROID) 88 MCG tablet, Indications: Friday, Saturday and  One tablet four times a week, Disp: 50 tablet, Rfl: 12    FLUoxetine (PROZAC) 10 MG capsule, Take 1 capsule by mouth daily, Disp: 90 capsule, Rfl: 3    calcium carbonate 600 MG TABS tablet, Take 1 tablet by mouth daily, Disp: , Rfl:     Cyanocobalamin (VITAMIN B-12) 1000 MCG SUBL, Place under the tongue, Disp: , Rfl:   Allergies   Allergen Reactions    Azithromycin      Stomach pains    Doxycycline      Stomach pains    Zanaflex [Tizanidine]      Stomach pains     Social History     Socioeconomic History    Marital status:      Spouse name: Not on file    Number of children: Not on file    Years of education: Not on file    Highest education level: Not on file   Occupational History    Not on file   Social Needs    Financial resource strain: Not on file    Food insecurity     Worry: Not on file     Inability: Not on file    Transportation needs     Medical: Not on file     Non-medical: Not on file   Tobacco Use    Smoking status: Never Smoker    Smokeless tobacco: Never Used   Substance and Sexual Activity    Alcohol use: No    Drug use: No    Sexual activity: Not on file   Lifestyle    Physical activity     Days per week: Not on file     Minutes per session: Not on file    Stress: Not on file   Relationships    Social connections     Talks on phone: Not on file     Gets together: Not on file     Attends Quaker service: Not on file     Active member of club or organization: Not on file     Attends meetings of clubs or organizations: Not on file     Relationship status: Not on file    Intimate partner violence     Fear of current or ex partner: Not on file     Emotionally abused: Not on file     Physically abused: Not on file     Forced sexual activity: Not on file   Other Topics Concern    Not on file   Social History Narrative        HYPOTHYROIDISM    DEPRESSION [address] [facility] but later found not to be cancer    Renato Ruiz  1950 Page #2    RIGHT BREAST CA WITH 13 POSITIVE LYMPH NODES 1998 WITH BILATERAL MASTECTOMY AND    IMPLANTS. ---SEES DR ALBERT    Hysterectomy, left 1 ovary    right inguinal hernia repair    cataracts with IOL bilateral    FAMILY HISTORY OF COLON CA, DIABETES    DIET-CONTROLLED DIABETES    ELEVATED LIVER FUNCTION    HYPERLIPIDEMIA, ON STATINS IN THE PAST    LAST A1C 6.0    LAST CHOLESTEROL 231    CERV DISC OR DR ALBERT ---    ALLERGIES IN THE PAST. SAW DR. 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 Geoff Schmidt, THE    DENTIST    COLONOSCOPY 2015---DR [facility]---DIFFICULT TO PASS--PT REFUSES ANY MORE    MRI 11-16 WITH CERVICAL MOD SPINAL STENOSIS---EVAL DR GEMMA--- PT CANCELLED    SHOT    USE TO WORK WITH [employer]    CT HEAD WITH ANGIOMA---4-17 PT STATES WAS THERE YRS AGO    MYOFACIAL PELVIC PAIN SYNDROME 7-17 DR GARCIA----    RIGHT THIRD FINGER OR DR GONZALEZ 10-17    EVAL CARDIO WITH NEG ECHO 10-17 DR JOSE BAPTISTE    PREVAR 2017    EVAL DR CEJA----- [facility] THOUGHT ANAL-VAG FISTULA BUT MARCIAL DOUBTS- 1-19    NO UTI SINCE USING VAG COCUNUT OIL 1-19    egd  1-20  Dr Sharlene Helton  gasgritis      Past Medical History:   Diagnosis Date    Abdominal pain, other specified site 6/6/2012    Allergic urticaria     Cancer ([address]     1999 right breast cancer / treated with surgery and chemo    Depression 2000    Diet-controlled diabetes mellitus ([address]     Hyperlipidemia     DENIES    Hypothyroidism     Myofascial pain syndrome 2017    Pelvic - Dr Earline Castro    Osteoarthritis cervical spine     cervical spine    Pancreatic cancer ([address] 2007    [facility]    Pancreatitis, acute 6/6/2012    Prolonged emergence from general anesthesia      Family History   Problem Relation Age of Onset    Coronary Art Dis Mother     Diabetes Mother     Colon Cancer Father     Diabetes Other     Colon Cancer Other     Atrial Fibrillation Brother     High Blood Pressure Brother     High Cholesterol Brother     High Cholesterol Brother     High Blood Pressure Brother       Past Surgical History:   Procedure Laterality Date    BREAST RECONSTRUCTION Bilateral 7/25/2019    CAPSULECTOMY OF LEFT BREAST WITH REMOVAL OF SALINE  IMPLANTS AND REPLACMENT WITH BILATERAL SILICONE IMPLANTS performed by Roz Ma MD at [address] [facility]    radical mastectomy r / mastectomy at left    CATARACT REMOVAL Bilateral 2012   [facility] [address]    CHOLECYSTECTOMY  2007    with Whipple    COLONOSCOPY  2015    Dr. Abdias Vasquez    to repair muscle     HERNIA REPAIR Right years ago    inguinal    HYSTERECTOMY  1978    OTHER SURGICAL HISTORY Right 10/18/2017    EXCISION GANGLION CYST RIGHT MIDDLE FINGER    PANCREATECTOMY  2007    Whipple 1/3 taken       Vitals:    08/21/20 1438   BP: 128/78   Temp: 97.7 °F (36.5 °C)   TempSrc: Temporal   Weight: 144 lb (65.3 kg)       Objective:    Physical Exam  Vitals signs reviewed. Constitutional:       Appearance: She is well-developed. HENT:      Head: Normocephalic. Ears:      Comments: Right outer ear canal with sl alma  Eyes:      Pupils: Pupils are equal, round, and reactive to light. Neck:      Musculoskeletal: Normal range of motion. Cardiovascular:      Rate and Rhythm: Normal rate and regular rhythm. Pulmonary:      Effort: Pulmonary effort is normal.      Breath sounds: Normal breath sounds. Abdominal:      Palpations: Abdomen is soft. Musculoskeletal: Normal range of motion. Skin:     General: Skin is warm. Neurological:      Mental Status: She is alert and oriented to person, place, and time. Psychiatric:         Behavior: Behavior normal.         Edgar Macias was seen today for ear problem. Diagnoses and all orders for this visit:    Chronic otitis externa of right ear, unspecified type  -     cephALEXin (KEFLEX) 500 MG capsule; Take 1 capsule by mouth 2 times daily for 7 days    Dermatitis  -     mupirocin (BACTROBAN) 2 % ointment; Apply 3 times daily. Comments: med  Not great see  Worse to er  A great deal of time spent reviewing medications, diet, exercise, social issues. Also reviewing the chart before entering the room with patient and finishing charting after leaving patient's room. More than half of that time was spent face to face with the patient in counseling and coordinating care. Follow Up: Return if symptoms worsen or fail to improve.      Seen by:  Hermelindo Terrell DO

## 2020-08-27 ENCOUNTER — OFFICE VISIT (OUTPATIENT)
Dept: CARDIOLOGY CLINIC | Age: 70
End: 2020-08-27
Payer: MEDICARE

## 2020-08-27 VITALS
SYSTOLIC BLOOD PRESSURE: 118 MMHG | DIASTOLIC BLOOD PRESSURE: 80 MMHG | WEIGHT: 143.3 LBS | BODY MASS INDEX: 28.13 KG/M2 | HEART RATE: 81 BPM | HEIGHT: 60 IN | RESPIRATION RATE: 18 BRPM

## 2020-08-27 PROCEDURE — 1090F PRES/ABSN URINE INCON ASSESS: CPT | Performed by: INTERNAL MEDICINE

## 2020-08-27 PROCEDURE — 93000 ELECTROCARDIOGRAM COMPLETE: CPT | Performed by: INTERNAL MEDICINE

## 2020-08-27 PROCEDURE — 1123F ACP DISCUSS/DSCN MKR DOCD: CPT | Performed by: INTERNAL MEDICINE

## 2020-08-27 PROCEDURE — G8417 CALC BMI ABV UP PARAM F/U: HCPCS | Performed by: INTERNAL MEDICINE

## 2020-08-27 PROCEDURE — 3017F COLORECTAL CA SCREEN DOC REV: CPT | Performed by: INTERNAL MEDICINE

## 2020-08-27 PROCEDURE — G8427 DOCREV CUR MEDS BY ELIG CLIN: HCPCS | Performed by: INTERNAL MEDICINE

## 2020-08-27 PROCEDURE — 99214 OFFICE O/P EST MOD 30 MIN: CPT | Performed by: INTERNAL MEDICINE

## 2020-08-27 PROCEDURE — 4040F PNEUMOC VAC/ADMIN/RCVD: CPT | Performed by: INTERNAL MEDICINE

## 2020-08-27 PROCEDURE — G8399 PT W/DXA RESULTS DOCUMENT: HCPCS | Performed by: INTERNAL MEDICINE

## 2020-08-27 PROCEDURE — 1036F TOBACCO NON-USER: CPT | Performed by: INTERNAL MEDICINE

## 2020-08-27 RX ORDER — METOPROLOL SUCCINATE 25 MG/1
12.5 TABLET, EXTENDED RELEASE ORAL DAILY
Qty: 90 TABLET | Refills: 3 | Status: SHIPPED
Start: 2020-08-27 | End: 2021-01-11 | Stop reason: SDUPTHER

## 2020-08-27 RX ORDER — TURMERIC ROOT EXTRACT 500 MG
TABLET ORAL
COMMUNITY
End: 2021-10-21

## 2020-08-27 RX ORDER — M-VIT,TX,IRON,MINS/CALC/FOLIC 27MG-0.4MG
1 TABLET ORAL DAILY
COMMUNITY
End: 2022-04-19

## 2020-08-27 NOTE — PROGRESS NOTES
levothyroxine (SYNTHROID) 100 MCG tablet One table four times a week 50 tablet 12    levothyroxine (SYNTHROID) 88 MCG tablet Indications: Friday, Saturday and Sunday One tablet four times a week 50 tablet 12    FLUoxetine (PROZAC) 10 MG capsule Take 1 capsule by mouth daily 90 capsule 3    calcium carbonate 600 MG TABS tablet Take 1 tablet by mouth daily      Cyanocobalamin (VITAMIN B-12) 1000 MCG SUBL Place under the tongue       No current facility-administered medications for this visit.         Social History     Socioeconomic History    Marital status:      Spouse name: Not on file    Number of children: Not on file    Years of education: Not on file    Highest education level: Not on file   Occupational History    Not on file   Social Needs    Financial resource strain: Not on file    Food insecurity     Worry: Not on file     Inability: Not on file    Transportation needs     Medical: Not on file     Non-medical: Not on file   Tobacco Use    Smoking status: Never Smoker    Smokeless tobacco: Never Used   Substance and Sexual Activity    Alcohol use: No    Drug use: No    Sexual activity: Not on file   Lifestyle    Physical activity     Days per week: Not on file     Minutes per session: Not on file    Stress: Not on file   Relationships    Social connections     Talks on phone: Not on file     Gets together: Not on file     Attends Judaism service: Not on file     Active member of club or organization: Not on file     Attends meetings of clubs or organizations: Not on file     Relationship status: Not on file    Intimate partner violence     Fear of current or ex partner: Not on file     Emotionally abused: Not on file     Physically abused: Not on file     Forced sexual activity: Not on file   Other Topics Concern    Not on file   Social History Narrative        [address] PANCREATIC CA DIAGNOSED  WITH PARTIAL WHIPPLE PROCEDURE [facility] but later found not to be cancer    Billie Colmenares  1950 Page #2    RIGHT BREAST CA WITH 13 POSITIVE LYMPH NODES  WITH BILATERAL MASTECTOMY AND    IMPLANTS. ---SEES DR ALBERT    Hysterectomy, left 1 ovary    right inguinal hernia repair    cataracts with IOL bilateral    FAMILY HISTORY OF COLON CA, DIABETES    DIET-CONTROLLED DIABETES    ELEVATED LIVER FUNCTION    HYPERLIPIDEMIA, ON STATINS IN THE PAST    LAST A1C 6.0    LAST CHOLESTEROL 231    CERV DISC OR DR ALBERT ---    ALLERGIES IN THE PAST. SAW DR. 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 PRESENTLY PART-TIME WITH DR. Luis Levy, THE    DENTIST    COLONOSCOPY ---DR RIVER---DIFFICULT TO PASS--PT REFUSES ANY MORE    MRI - WITH CERVICAL MOD SPINAL STENOSIS---EVAL DR GEMMA--- PT CANCELLED    SHOT    USE TO WORK WITH [employer]    CT HEAD WITH ANGIOMA---- PT STATES WAS THERE YRS AGO    MYOFACIAL PELVIC PAIN SYNDROME - DR GARCIA----    RIGHT THIRD FINGER OR DR GONZALEZ 10-17    EVAL CARDIO WITH NEG ECHO 10-17 DR JOSE BAPTISTE    PREVAR     EVAL DR CEJA----- [facility] THOUGHT ANAL-VAG FISTULA BUT MARCIAL DOUBTS- -    NO UTI SINCE USING VAG COCUNUT OIL -    egd  -  Dr Taylor Odor  gasgritis       Family History   Problem Relation Age of Onset    Coronary Art Dis Mother     Diabetes Mother     Colon Cancer Father     Diabetes Other     Colon Cancer Other     Atrial Fibrillation Brother     High Blood Pressure Brother     High Cholesterol Brother     High Cholesterol Brother     High Blood Pressure Brother        Review of Systems:  Heart: as above   Lungs: as above   Eyes: denies changes in vision or discharge. Ears: denies changes in hearing or pain.    Nose: denies epistaxis or masses   Throat: denies sore throat or trouble swallowing. Neuro: denies numbness, tingling, tremors. Skin: denies rashes or itching. : denies hematuria, dysuria   GI: denies vomiting, diarrhea   Psych: denies mood changed, anxiety, depression. All other systems negative. Physical Exam   /80   Pulse 81   Resp 18   Ht 5' (1.524 m)   Wt 143 lb 4.8 oz (65 kg)   BMI 27.99 kg/m²   Constitutional: Oriented to person, place, and time. Well-developed and well-nourished. No distress. Head: Normocephalic and atraumatic. Eyes: EOM are normal. Pupils are equal, round, and reactive to light. Neck: Normal range of motion. Neck supple. No hepatojugular reflux and no JVD present. Carotid bruit is not present. No tracheal deviation present. No thyromegaly present. Cardiovascular: Normal rate, regular rhythm, normal heart sounds and intact distal pulses. Exam reveals no gallop and no friction rub. No murmur heard. Pulmonary/Chest: Effort normal and breath sounds normal. No respiratory distress. No wheezes. No rales. No tenderness. Abdominal: Soft. Bowel sounds are normal. No distension and no mass. No tenderness. No rebound and no guarding. Musculoskeletal: Normal range of motion. No edema and no tenderness. Lymphadenopathy:   No cervical adenopathy. No groin adenopathy. Neurological: Alert and oriented to person, place, and time. Skin: Skin is warm and dry. No rash noted. Not diaphoretic. No erythema. Psychiatric: Normal mood and affect. Behavior is normal.     EKG:  normal sinus rhythm, nonspecific ST and T waves changes, PAC's noted. Echo Summary 9/18/17:   Left ventricular internal dimensions, wall thickness, regional wall motion, and systolic function appear to be normal.   Ejection fraction is visually estimated at 60%. There is Doppler evidence for stage I diastolic dysfunction. No obvious abnormality of valve function was seen.     ASSESSMENT AND PLAN:  Patient Active Problem List   Diagnosis    Acquired hypothyroidism    Anxiety    Stenosis of cervical spine    Ganglion of flexor tendon sheath of right middle finger    History of breast cancer    Essential hypertension    Chronic gastritis without bleeding    Primary osteoarthritis of left knee    Gastroesophageal reflux disease without esophagitis    Spondylosis of lumbar region without myelopathy or radiculopathy    Pre-diabetes    Palpitations    Dermatitis    Chronic otitis externa of right ear     1. Palpitations:    Echo normal 9/17. Holter 9/17 NSR with no ventricular ectopy but some rare PAC's and one 3 beat run of PAT. Trial BB. Brandi Mcgarry D.MANUELA.   Cardiologist  Cardiology, [facility]

## 2020-09-25 ENCOUNTER — OFFICE VISIT (OUTPATIENT)
Dept: SURGERY | Age: 70
End: 2020-09-25
Payer: MEDICARE

## 2020-09-25 VITALS
SYSTOLIC BLOOD PRESSURE: 112 MMHG | BODY MASS INDEX: 29.84 KG/M2 | HEIGHT: 60 IN | OXYGEN SATURATION: 96 % | DIASTOLIC BLOOD PRESSURE: 76 MMHG | TEMPERATURE: 99.1 F | WEIGHT: 152 LBS

## 2020-09-25 PROCEDURE — 4040F PNEUMOC VAC/ADMIN/RCVD: CPT | Performed by: PHYSICIAN ASSISTANT

## 2020-09-25 PROCEDURE — 1036F TOBACCO NON-USER: CPT | Performed by: PHYSICIAN ASSISTANT

## 2020-09-25 PROCEDURE — 99213 OFFICE O/P EST LOW 20 MIN: CPT | Performed by: PHYSICIAN ASSISTANT

## 2020-09-25 PROCEDURE — 1090F PRES/ABSN URINE INCON ASSESS: CPT | Performed by: PHYSICIAN ASSISTANT

## 2020-09-25 PROCEDURE — 3017F COLORECTAL CA SCREEN DOC REV: CPT | Performed by: PHYSICIAN ASSISTANT

## 2020-09-25 PROCEDURE — G8399 PT W/DXA RESULTS DOCUMENT: HCPCS | Performed by: PHYSICIAN ASSISTANT

## 2020-09-25 PROCEDURE — 1123F ACP DISCUSS/DSCN MKR DOCD: CPT | Performed by: PHYSICIAN ASSISTANT

## 2020-09-25 PROCEDURE — G8417 CALC BMI ABV UP PARAM F/U: HCPCS | Performed by: PHYSICIAN ASSISTANT

## 2020-09-25 PROCEDURE — G8427 DOCREV CUR MEDS BY ELIG CLIN: HCPCS | Performed by: PHYSICIAN ASSISTANT

## 2020-09-25 NOTE — PROGRESS NOTES
implants. As the patient has a new onset breast reconstruction pain and tenderness with history of silicone breast implants, breast cancer as well as previous capsulectomy I will order her an MRI of her bilateral breast reconstruction to rule out any underlying pathology as well as to assess the integrity of her bilateral breast implants. After completion of her bilateral breast MRI I will have her follow-up with Dr. Yanick Nicholson for review of her imaging as well as physical exam.  Patient voices understanding    No restrictions at this time.     Continue with routine primary care follow-ups as well as medical oncology as needed      F/U after completion of bilateral breast MRI    9/25/2020

## 2020-09-29 ENCOUNTER — HOSPITAL ENCOUNTER (OUTPATIENT)
Age: 70
Discharge: HOME | End: 2020-10-01
Payer: MEDICARE

## 2020-09-29 LAB
BUN BLDV-MCNC: 16 MG/DL (ref 8–23)
CREAT SERPL-MCNC: 0.9 MG/DL (ref 0.5–1)
GFR AFRICAN AMERICAN: >60
GFR NON-AFRICAN AMERICAN: >60 ML/MIN/1.73

## 2020-09-29 PROCEDURE — 84520 ASSAY OF UREA NITROGEN: CPT

## 2020-09-29 PROCEDURE — 36415 COLL VENOUS BLD VENIPUNCTURE: CPT

## 2020-09-29 PROCEDURE — 82565 ASSAY OF CREATININE: CPT

## 2020-10-13 ENCOUNTER — HOSPITAL ENCOUNTER (OUTPATIENT)
Dept: MRI IMAGING | Age: 70
Discharge: HOME | End: 2020-10-15
Payer: MEDICARE

## 2020-10-13 PROCEDURE — 6360000004 HC RX CONTRAST MEDICATION: Performed by: RADIOLOGY

## 2020-10-13 PROCEDURE — 77049 MRI BREAST C-+ W/CAD BI: CPT

## 2020-10-13 PROCEDURE — A9585 GADOBUTROL INJECTION: HCPCS | Performed by: RADIOLOGY

## 2020-10-13 RX ADMIN — GADOBUTROL 7 ML: 604.72 INJECTION INTRAVENOUS at 14:36

## 2020-10-15 ENCOUNTER — TELEPHONE (OUTPATIENT)
Dept: SURGERY | Age: 70
End: 2020-10-15

## 2020-10-15 NOTE — TELEPHONE ENCOUNTER
Patient stated not having any pain would like to cx appt on 10/16/20 and scheduled yrly check up for 9/15/2021.

## 2020-10-15 NOTE — TELEPHONE ENCOUNTER
Patient called and stated she received normal results from mri of breast, asking is she still needs to keep appointment on 10/16/2020 since results were normal. Please advise.

## 2020-11-16 RX ORDER — FLUTICASONE PROPIONATE 50 MCG
1 SPRAY, SUSPENSION (ML) NASAL 2 TIMES DAILY
Qty: 1 BOTTLE | Refills: 12 | Status: SHIPPED
Start: 2020-11-16 | End: 2021-01-11 | Stop reason: SDUPTHER

## 2020-11-30 ENCOUNTER — OFFICE VISIT (OUTPATIENT)
Dept: CARDIOLOGY CLINIC | Age: 70
End: 2020-11-30
Payer: MEDICARE

## 2020-11-30 VITALS
DIASTOLIC BLOOD PRESSURE: 80 MMHG | RESPIRATION RATE: 16 BRPM | BODY MASS INDEX: 28.78 KG/M2 | HEIGHT: 60 IN | HEART RATE: 66 BPM | WEIGHT: 146.6 LBS | SYSTOLIC BLOOD PRESSURE: 138 MMHG

## 2020-11-30 PROCEDURE — G8417 CALC BMI ABV UP PARAM F/U: HCPCS | Performed by: INTERNAL MEDICINE

## 2020-11-30 PROCEDURE — G8399 PT W/DXA RESULTS DOCUMENT: HCPCS | Performed by: INTERNAL MEDICINE

## 2020-11-30 PROCEDURE — 4040F PNEUMOC VAC/ADMIN/RCVD: CPT | Performed by: INTERNAL MEDICINE

## 2020-11-30 PROCEDURE — G8427 DOCREV CUR MEDS BY ELIG CLIN: HCPCS | Performed by: INTERNAL MEDICINE

## 2020-11-30 PROCEDURE — 3017F COLORECTAL CA SCREEN DOC REV: CPT | Performed by: INTERNAL MEDICINE

## 2020-11-30 PROCEDURE — 99214 OFFICE O/P EST MOD 30 MIN: CPT | Performed by: INTERNAL MEDICINE

## 2020-11-30 PROCEDURE — 1090F PRES/ABSN URINE INCON ASSESS: CPT | Performed by: INTERNAL MEDICINE

## 2020-11-30 PROCEDURE — G8484 FLU IMMUNIZE NO ADMIN: HCPCS | Performed by: INTERNAL MEDICINE

## 2020-11-30 PROCEDURE — 93000 ELECTROCARDIOGRAM COMPLETE: CPT | Performed by: INTERNAL MEDICINE

## 2020-11-30 PROCEDURE — 1036F TOBACCO NON-USER: CPT | Performed by: INTERNAL MEDICINE

## 2020-11-30 PROCEDURE — 1123F ACP DISCUSS/DSCN MKR DOCD: CPT | Performed by: INTERNAL MEDICINE

## 2020-11-30 NOTE — PROGRESS NOTES
CHIEF COMPLAINT: Palpitations    HISTORY OF PRESENT ILLNESS: Patient is a 65 y.o. female seen at the request of Ramírez Davila DO. Patient presents in follow up. Similar symptoms in past. Benign evaluation for this 9/17. No CP or SOB. No other issues.     Past Medical History:   Diagnosis Date    Abdominal pain, other specified site 6/6/2012    Allergic urticaria     Cancer ([facility]     1999 right breast cancer / treated with surgery and chemo    Depression 2000    Diet-controlled diabetes mellitus ([facility]     Hyperlipidemia     DENIES    Hypothyroidism     Myofascial pain syndrome 2017    Pelvic - Dr Harriet Selby    Osteoarthritis cervical spine     cervical spine    Pancreatic cancer ([facility] [address] [facility] 2007    Partial Wh[facility] Procedure - [facility]    Pancreatitis, acute 6/6/2012    Prolonged emergence from general anesthesia        Patient Active Problem List   Diagnosis    Acquired hypothyroidism    Anxiety    Stenosis of cervical spine    Ganglion of flexor tendon sheath of right middle finger    History of breast cancer    Essential hypertension    Chronic gastritis without bleeding    Primary osteoarthritis of left knee    Gastroesophageal reflux disease without esophagitis    Spondylosis of lumbar region without myelopathy or radiculopathy    Pre-diabetes    Palpitations    Dermatitis    Chronic otitis externa of right ear       Allergies   Allergen Reactions    Azithromycin      Stomach pains    Doxycycline      Stomach pains    Zanaflex [Tizanidine]      Stomach pains       Current Outpatient Medications   Medication Sig Dispense Refill    fluticasone (FLONASE) 50 MCG/ACT nasal spray 1 spray by Each Nostril route 2 times daily 1 Bottle 12    Multiple Vitamins-Minerals (THERAPEUTIC MULTIVITAMIN-MINERALS) tablet Take 1 tablet by mouth daily      Turmeric 500 MG TABS Take by mouth      Cranberry-Cholecalciferol 4200-500 MG-UNIT CAPS Take by mouth      metoprolol succinate (TOPROL XL) 25 MG extended release tablet Take 0.5 tablets by mouth daily 90 tablet 3    levothyroxine (SYNTHROID) 100 MCG tablet One table four times a week (Patient taking differently: three times a week ) 50 tablet 12    levothyroxine (SYNTHROID) 88 MCG tablet Indications: Friday, Saturday and Sunday One tablet four times a week (Patient taking differently: four times a week ) 50 tablet 12    FLUoxetine (PROZAC) 10 MG capsule Take 1 capsule by mouth daily 90 capsule 3    calcium carbonate 600 MG TABS tablet Take 1 tablet by mouth daily      Cyanocobalamin (VITAMIN B-12) 1000 MCG SUBL Place under the tongue       No current facility-administered medications for this visit.         Social History     Socioeconomic History    Marital status:      Spouse name: Not on file    Number of children: Not on file    Years of education: Not on file    Highest education level: Not on file   Occupational History    Not on file   Social Needs    Financial resource strain: Not on file    Food insecurity     Worry: Not on file     Inability: Not on file    Transportation needs     Medical: Not on file     Non-medical: Not on file   Tobacco Use    Smoking status: Never Smoker    Smokeless tobacco: Never Used   Substance and Sexual Activity    Alcohol use: No    Drug use: No    Sexual activity: Not on file   Lifestyle    Physical activity     Days per week: Not on file     Minutes per session: Not on file    Stress: Not on file   Relationships    Social connections     Talks on phone: Not on file     Gets together: Not on file     Attends Gnosticism service: Not on file     Active member of club or organization: Not on file     Attends meetings of clubs or organizations: Not on file     Relationship status: Not on file    Intimate partner violence     Fear of current or ex partner: Not on file     Emotionally abused: Not on file     Physically abused: Not on file     Forced sexual activity: Not on file Other Topics Concern    Not on file   Social History Narrative        HYPOTHYROIDISM    DEPRESSION SINCE     ALLERGIC URTICARIA GOING ON NOW    [address]    PANCREATIC CA DIAGNOSED  WITH PARTIAL [address] but later found not to be cancer    Lorna Park  1950 Page #2    RIGHT BREAST CA WITH 13 POSITIVE LYMPH NODES  WITH BILATERAL MASTECTOMY AND    IMPLANTS. ---SEES DR ALBRET    Hysterectomy, left 1 ovary    right inguinal hernia repair    cataracts with IOL bilateral    FAMILY HISTORY OF COLON CA, DIABETES    DIET-CONTROLLED DIABETES    ELEVATED LIVER FUNCTION    HYPERLIPIDEMIA, ON STATINS IN THE PAST    LAST A1C 6.0    LAST CHOLESTEROL 231    CERV DISC OR DR ALBERT ---    ALLERGIES IN THE PAST. SAW DR. 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 Jose Ashraf, THE    DENTIST    COLONOSCOPY ---DR RIVER---DIFFICULT TO PASS--PT REFUSES ANY MORE    MRI - WITH CERVICAL MOD SPINAL STENOSIS---EVAL DR GEMMA--- PT CANCELLED    SHOT    USE TO WORK WITH [employer]    CT HEAD WITH ANGIOMA---- PT STATES WAS THERE YRS AGO    MYOFACIAL PELVIC PAIN SYNDROME - DR GARCIA----    RIGHT THIRD FINGER OR DR GONZALEZ 10-17    EVAL CARDIO WITH NEG ECHO 10-17 DR JOSE BAPTISTE    PREVAR     EVAL DR CEJA----- [facility] THOUGHT ANAL-VAG FISTULA BUT MARCIAL DOUBTS- -19    NO UTI SINCE USING VAG COCUNUT OIL -    egd  -20  Dr Galan Donna  gasgritis       Family History   Problem Relation Age of Onset    Coronary Art Dis Mother     Diabetes Mother     Colon Cancer Father     Diabetes Other     Colon Cancer Other     Atrial Fibrillation Brother     High Blood Pressure Brother     High Cholesterol Brother     High Cholesterol Brother     High Blood Pressure Brother        Review of Systems:  Heart: as above   Lungs: as above   Eyes: denies changes in vision or discharge. Ears: denies changes in hearing or pain. Nose: denies epistaxis or masses   Throat: denies sore throat or trouble swallowing. Neuro: denies numbness, tingling, tremors. Skin: denies rashes or itching. : denies hematuria, dysuria   GI: denies vomiting, diarrhea   Psych: denies mood changed, anxiety, depression. All other systems negative. Physical Exam   /80   Pulse 66   Resp 16   Ht 5' (1.524 m)   Wt 146 lb 9.6 oz (66.5 kg)   BMI 28.63 kg/m²   Constitutional: Oriented to person, place, and time. Well-developed and well-nourished. No distress. Head: Normocephalic and atraumatic. Eyes: EOM are normal. Pupils are equal, round, and reactive to light. Neck: Normal range of motion. Neck supple. No hepatojugular reflux and no JVD present. Carotid bruit is not present. No tracheal deviation present. No thyromegaly present. Cardiovascular: Normal rate, regular rhythm, normal heart sounds and intact distal pulses. Exam reveals no gallop and no friction rub. No murmur heard. Pulmonary/Chest: Effort normal and breath sounds normal. No respiratory distress. No wheezes. No rales. No tenderness. Abdominal: Soft. Bowel sounds are normal. No distension and no mass. No tenderness. No rebound and no guarding. Musculoskeletal: Normal range of motion. No edema and no tenderness. Lymphadenopathy:   No cervical adenopathy. No groin adenopathy. Neurological: Alert and oriented to person, place, and time. Skin: Skin is warm and dry. No rash noted. Not diaphoretic. No erythema. Psychiatric: Normal mood and affect. Behavior is normal.     EKG:  normal sinus rhythm, nonspecific ST and T waves changes. Echo Summary 9/18/17:   Left ventricular internal dimensions, wall thickness, regional wall motion, and systolic function appear to be normal.   Ejection fraction is visually estimated at 60%.    There is Doppler evidence for stage I diastolic dysfunction. No obvious abnormality of valve function was seen. ASSESSMENT AND PLAN:  Patient Active Problem List   Diagnosis    Acquired hypothyroidism    Anxiety    Stenosis of cervical spine    Ganglion of flexor tendon sheath of right middle finger    History of breast cancer    Essential hypertension    Chronic gastritis without bleeding    Primary osteoarthritis of left knee    Gastroesophageal reflux disease without esophagitis    Spondylosis of lumbar region without myelopathy or radiculopathy    Pre-diabetes    Palpitations    Dermatitis    Chronic otitis externa of right ear     1. Palpitations:    Echo normal 9/17. Holter 9/17 NSR with no ventricular ectopy but some rare PAC's and one 3 beat run of PAT. Improved with BB. Observe. Evans Shannon D.O.   Cardiologist  Cardiology, [facility]

## 2020-12-03 ENCOUNTER — OFFICE VISIT (OUTPATIENT)
Dept: PRIMARY CARE CLINIC | Age: 70
End: 2020-12-03
Payer: MEDICARE

## 2020-12-03 VITALS
SYSTOLIC BLOOD PRESSURE: 128 MMHG | HEIGHT: 60 IN | DIASTOLIC BLOOD PRESSURE: 78 MMHG | BODY MASS INDEX: 28.47 KG/M2 | TEMPERATURE: 98 F | WEIGHT: 145 LBS

## 2020-12-03 DIAGNOSIS — N30.00 ACUTE CYSTITIS WITHOUT HEMATURIA: ICD-10-CM

## 2020-12-03 LAB
BILIRUBIN, POC: NORMAL
BLOOD URINE, POC: NORMAL
CLARITY, POC: NORMAL
COLOR, POC: YELLOW
GLUCOSE URINE, POC: NORMAL
KETONES, POC: NORMAL
LEUKOCYTE EST, POC: NORMAL
NITRITE, POC: NORMAL
PH, POC: 6.5
PROTEIN, POC: NORMAL
SPECIFIC GRAVITY, POC: 1.02
UROBILINOGEN, POC: 0.2

## 2020-12-03 PROCEDURE — 4040F PNEUMOC VAC/ADMIN/RCVD: CPT | Performed by: FAMILY MEDICINE

## 2020-12-03 PROCEDURE — 81002 URINALYSIS NONAUTO W/O SCOPE: CPT | Performed by: FAMILY MEDICINE

## 2020-12-03 PROCEDURE — G8399 PT W/DXA RESULTS DOCUMENT: HCPCS | Performed by: FAMILY MEDICINE

## 2020-12-03 PROCEDURE — 1123F ACP DISCUSS/DSCN MKR DOCD: CPT | Performed by: FAMILY MEDICINE

## 2020-12-03 PROCEDURE — 3017F COLORECTAL CA SCREEN DOC REV: CPT | Performed by: FAMILY MEDICINE

## 2020-12-03 PROCEDURE — G8417 CALC BMI ABV UP PARAM F/U: HCPCS | Performed by: FAMILY MEDICINE

## 2020-12-03 PROCEDURE — 99213 OFFICE O/P EST LOW 20 MIN: CPT | Performed by: FAMILY MEDICINE

## 2020-12-03 PROCEDURE — 1090F PRES/ABSN URINE INCON ASSESS: CPT | Performed by: FAMILY MEDICINE

## 2020-12-03 PROCEDURE — G8428 CUR MEDS NOT DOCUMENT: HCPCS | Performed by: FAMILY MEDICINE

## 2020-12-03 PROCEDURE — G8484 FLU IMMUNIZE NO ADMIN: HCPCS | Performed by: FAMILY MEDICINE

## 2020-12-03 PROCEDURE — 1036F TOBACCO NON-USER: CPT | Performed by: FAMILY MEDICINE

## 2020-12-03 RX ORDER — PREDNISONE 10 MG/1
TABLET ORAL
Qty: 18 TABLET | Refills: 0 | Status: SHIPPED
Start: 2020-12-03 | End: 2021-01-11

## 2020-12-03 RX ORDER — NITROFURANTOIN 25; 75 MG/1; MG/1
100 CAPSULE ORAL 2 TIMES DAILY
Qty: 14 CAPSULE | Refills: 0 | Status: SHIPPED
Start: 2020-12-03 | End: 2021-01-11

## 2020-12-03 ASSESSMENT — ENCOUNTER SYMPTOMS
EYES NEGATIVE: 1
ALLERGIC/IMMUNOLOGIC NEGATIVE: 1
BACK PAIN: 1
GASTROINTESTINAL NEGATIVE: 1
RESPIRATORY NEGATIVE: 1

## 2020-12-03 ASSESSMENT — PATIENT HEALTH QUESTIONNAIRE - PHQ9
SUM OF ALL RESPONSES TO PHQ QUESTIONS 1-9: 0
SUM OF ALL RESPONSES TO PHQ QUESTIONS 1-9: 0
1. LITTLE INTEREST OR PLEASURE IN DOING THINGS: 0
SUM OF ALL RESPONSES TO PHQ9 QUESTIONS 1 & 2: 0
SUM OF ALL RESPONSES TO PHQ QUESTIONS 1-9: 0
2. FEELING DOWN, DEPRESSED OR HOPELESS: 0

## 2020-12-03 NOTE — PROGRESS NOTES
12/3/20  Name: Clint Richmond    : 1950    Sex: female    Age: 65 y.o. Subjective:  Chief Complaint: Patient is here for back paon     Onset one mo af ter  Decorating   Feels like   uti    Some  freq    Fees  Got between legs       preesure  droped off ua  11-12 and otld    Neg cx     She tkes levid for bl spasms  She says baclofen  Helps a little  Saw clled uol and told to se ita      Review of Systems   Constitutional: Negative. HENT: Negative. Eyes: Negative. Respiratory: Negative. Cardiovascular: Negative. Gastrointestinal: Negative. Endocrine: Negative. Genitourinary: Negative. Musculoskeletal: Positive for back pain. Skin: Negative. Allergic/Immunologic: Negative. Neurological: Negative. Hematological: Negative. Psychiatric/Behavioral: Negative.           Current Outpatient Medications:     predniSONE (DELTASONE) 10 MG tablet, ONE TID FOR THREE DAYS, ONE BID FOR THREE DAYS, ONE QD FOR THREE DAYS   FOOD, Disp: 18 tablet, Rfl: 0    nitrofurantoin, macrocrystal-monohydrate, (MACROBID) 100 MG capsule, Take 1 capsule by mouth 2 times daily, Disp: 14 capsule, Rfl: 0    fluticasone (FLONASE) 50 MCG/ACT nasal spray, 1 spray by Each Nostril route 2 times daily, Disp: 1 Bottle, Rfl: 12    Multiple Vitamins-Minerals (THERAPEUTIC MULTIVITAMIN-MINERALS) tablet, Take 1 tablet by mouth daily, Disp: , Rfl:     Turmeric 500 MG TABS, Take by mouth, Disp: , Rfl:     Cranberry-Cholecalciferol 4200-500 MG-UNIT CAPS, Take by mouth, Disp: , Rfl:     metoprolol succinate (TOPROL XL) 25 MG extended release tablet, Take 0.5 tablets by mouth daily, Disp: 90 tablet, Rfl: 3    levothyroxine (SYNTHROID) 100 MCG tablet, One table four times a week (Patient taking differently: three times a week ), Disp: 50 tablet, Rfl: 12    levothyroxine (SYNTHROID) 88 MCG tablet, Indications: Friday, Saturday and  One tablet four times a week (Patient taking differently: four times a [facility] but later found not to be cancer    Carol Sahni  1950 Page #2    RIGHT BREAST CA WITH 13 POSITIVE LYMPH NODES  WITH BILATERAL MASTECTOMY AND    IMPLANTS. ---SEES DR ALBERT    Hysterectomy, left 1 ovary    right inguinal hernia repair    cataracts with IOL bilateral    FAMILY HISTORY OF COLON CA, DIABETES    DIET-CONTROLLED DIABETES    ELEVATED LIVER FUNCTION    HYPERLIPIDEMIA, ON STATINS IN THE PAST    LAST A1C 6.0    LAST CHOLESTEROL 231    CERV DISC OR DR ALBERT ---    ALLERGIES IN THE PAST. SAW DR. 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 Roselynn Lanes, THE    DENTIST    COLONOSCOPY ---DR RIVER---DIFFICULT TO PASS--PT REFUSES ANY MORE    MRI  WITH CERVICAL MOD SPINAL STENOSIS---EVAL DR GEMMA--- PT CANCELLED    SHOT    USE TO WORK WITH [employer]    CT HEAD WITH ANGIOMA--- PT STATES WAS THERE YRS AGO    MYOFACIAL PELVIC PAIN SYNDROME  DR GARCIA----    RIGHT THIRD FINGER OR DR GONZALEZ 10-17    EVAL CARDIO WITH NEG ECHO 10-17 DR JOSE BAPTISTE    PREVAR 2017    [facility] DR [facility]-VAG FISTULA BUT MARCIAL DOUBTS-     NO UTI SINCE USING VAG COCUNUT OIL     egd    Dr Edouard Edu  gasgritis      Past Medical History:   Diagnosis Date    Abdominal pain, other specified site 2012    Allergic urticaria     Cancer ([facility] [address]     1999 right breast cancer / treated with surgery and chemo    Depression 2000    Diet-controlled diabetes mellitus ([address]     Hyperlipidemia     DENIES    Hypothyroidism     Myofascial pain syndrome 2017    Pelvic - Dr Courtney Lemons    Osteoarthritis cervical spine     cervical spine    Pancreatic cancer [facility]) 2007    [facility] - [facility]    Pancreatitis, acute 2012    Prolonged emergence from general anesthesia      Family History   Problem Relation Age of Onset    Coronary Art Dis Mother     Diabetes Mother     Colon Cancer Father     Diabetes Other     Colon Cancer Other     Atrial Fibrillation Brother     High Blood Pressure Brother     High Cholesterol Brother     High Cholesterol Brother     High Blood Pressure Brother       Past Surgical History:   Procedure Laterality Date    BREAST RECONSTRUCTION Bilateral 7/25/2019    CAPSULECTOMY OF LEFT BREAST WITH REMOVAL OF SALINE  IMPLANTS AND REPLACMENT WITH BILATERAL SILICONE IMPLANTS performed by Hi Murillo MD at [facility]    radical mastectomy r / mastectomy at left    CATARACT REMOVAL Bilateral 2012   [facility] [address]    CHOLECYSTECTOMY  2007    with [facility]  2015    Dr. Mary Morejon    to repair muscle     HERNIA REPAIR Right years ago    inguinal    HYSTERECTOMY  1978    OTHER SURGICAL HISTORY Right 10/18/2017    EXCISION GANGLION CYST RIGHT MIDDLE FINGER    PANCREATECTOMY  2007    Whipple 1/3 taken       Vitals:    12/03/20 1244   BP: 128/78   Temp: 98 °F (36.7 °C)   Weight: 145 lb (65.8 kg)   Height: 5' (1.524 m)       Objective:    Physical Exam  Vitals signs reviewed. Constitutional:       Appearance: She is well-developed. HENT:      Head: Normocephalic. Eyes:      Pupils: Pupils are equal, round, and reactive to light. Neck:      Musculoskeletal: Normal range of motion. Cardiovascular:      Rate and Rhythm: Normal rate and regular rhythm. Pulmonary:      Effort: Pulmonary effort is normal.      Breath sounds: Normal breath sounds. Abdominal:      Palpations: Abdomen is soft. Musculoskeletal: Normal range of motion. Comments: Left si  Tender with palp   Skin:     General: Skin is warm. Neurological:      Mental Status: She is alert and oriented to person, place, and time.    Psychiatric:         Behavior: Behavior normal. Denise Benites was seen today for back pain. Diagnoses and all orders for this visit:    Acute cystitis without hematuria  -     POCT Urinalysis no Micro  -     nitrofurantoin, macrocrystal-monohydrate, (MACROBID) 100 MG capsule; Take 1 capsule by mouth 2 times daily    Sacro-iliac pain  -     predniSONE (DELTASONE) 10 MG tablet; ONE TID FOR THREE DAYS, ONE BID FOR THREE DAYS, ONE QD FOR THREE DAYS   FOOD        Comments: med  fuwith urol  Dr Alvarez drew     Not  Great see  A great deal of time spent reviewing medications, diet, exercise, social issues. Also reviewing the chart before entering the room with patient and finishing charting after leaving patient's room. More than half of that time was spent face to face with the patient in counseling and coordinating care. Follow Up: Return if symptoms worsen or fail to improve.      Seen by:  Ileana Nassar DO

## 2020-12-06 LAB — URINE CULTURE, ROUTINE: NORMAL

## 2020-12-07 ENCOUNTER — TELEPHONE (OUTPATIENT)
Dept: PRIMARY CARE CLINIC | Age: 70
End: 2020-12-07

## 2021-01-04 DIAGNOSIS — E03.9 ACQUIRED HYPOTHYROIDISM: Chronic | ICD-10-CM

## 2021-01-04 DIAGNOSIS — E78.2 MIXED HYPERLIPIDEMIA: ICD-10-CM

## 2021-01-04 DIAGNOSIS — M81.0 AGE-RELATED OSTEOPOROSIS WITHOUT CURRENT PATHOLOGICAL FRACTURE: ICD-10-CM

## 2021-01-04 DIAGNOSIS — R73.03 PRE-DIABETES: Chronic | ICD-10-CM

## 2021-01-04 LAB
ALBUMIN SERPL-MCNC: 4.1 G/DL (ref 3.5–5.2)
ALP BLD-CCNC: 118 U/L (ref 35–104)
ALT SERPL-CCNC: 19 U/L (ref 0–32)
ANION GAP SERPL CALCULATED.3IONS-SCNC: 11 MMOL/L (ref 7–16)
AST SERPL-CCNC: 24 U/L (ref 0–31)
BACTERIA: ABNORMAL /HPF
BASOPHILS ABSOLUTE: 0.05 E9/L (ref 0–0.2)
BASOPHILS RELATIVE PERCENT: 0.7 % (ref 0–2)
BILIRUB SERPL-MCNC: 0.5 MG/DL (ref 0–1.2)
BILIRUBIN URINE: NEGATIVE
BLOOD, URINE: ABNORMAL
BUN BLDV-MCNC: 14 MG/DL (ref 8–23)
CALCIUM SERPL-MCNC: 9.2 MG/DL (ref 8.6–10.2)
CHLORIDE BLD-SCNC: 102 MMOL/L (ref 98–107)
CHOLESTEROL, TOTAL: 241 MG/DL (ref 0–199)
CLARITY: CLEAR
CO2: 26 MMOL/L (ref 22–29)
COLOR: YELLOW
CREAT SERPL-MCNC: 0.8 MG/DL (ref 0.5–1)
EOSINOPHILS ABSOLUTE: 0.23 E9/L (ref 0.05–0.5)
EOSINOPHILS RELATIVE PERCENT: 3.3 % (ref 0–6)
GFR AFRICAN AMERICAN: >60
GFR NON-AFRICAN AMERICAN: >60 ML/MIN/1.73
GLUCOSE BLD-MCNC: 92 MG/DL (ref 74–99)
GLUCOSE URINE: NEGATIVE MG/DL
HBA1C MFR BLD: 5.6 % (ref 4–5.6)
HCT VFR BLD CALC: 43.9 % (ref 34–48)
HDLC SERPL-MCNC: 61 MG/DL
HEMOGLOBIN: 13.8 G/DL (ref 11.5–15.5)
IMMATURE GRANULOCYTES #: 0.04 E9/L
IMMATURE GRANULOCYTES %: 0.6 % (ref 0–5)
KETONES, URINE: NEGATIVE MG/DL
LDL CHOLESTEROL CALCULATED: 142 MG/DL (ref 0–99)
LEUKOCYTE ESTERASE, URINE: ABNORMAL
LYMPHOCYTES ABSOLUTE: 1.74 E9/L (ref 1.5–4)
LYMPHOCYTES RELATIVE PERCENT: 24.9 % (ref 20–42)
MCH RBC QN AUTO: 28 PG (ref 26–35)
MCHC RBC AUTO-ENTMCNC: 31.4 % (ref 32–34.5)
MCV RBC AUTO: 89 FL (ref 80–99.9)
MONOCYTES ABSOLUTE: 0.6 E9/L (ref 0.1–0.95)
MONOCYTES RELATIVE PERCENT: 8.6 % (ref 2–12)
NEUTROPHILS ABSOLUTE: 4.32 E9/L (ref 1.8–7.3)
NEUTROPHILS RELATIVE PERCENT: 61.9 % (ref 43–80)
NITRITE, URINE: NEGATIVE
PDW BLD-RTO: 13.6 FL (ref 11.5–15)
PH UA: 6 (ref 5–9)
PLATELET # BLD: 299 E9/L (ref 130–450)
PMV BLD AUTO: 10.8 FL (ref 7–12)
POTASSIUM SERPL-SCNC: 4.2 MMOL/L (ref 3.5–5)
PROTEIN UA: NEGATIVE MG/DL
RBC # BLD: 4.93 E12/L (ref 3.5–5.5)
RBC UA: ABNORMAL /HPF (ref 0–2)
SODIUM BLD-SCNC: 139 MMOL/L (ref 132–146)
SPECIFIC GRAVITY UA: 1.02 (ref 1–1.03)
T4 TOTAL: 8.6 MCG/DL (ref 4.5–11.7)
TOTAL PROTEIN: 6.6 G/DL (ref 6.4–8.3)
TRIGL SERPL-MCNC: 191 MG/DL (ref 0–149)
TSH SERPL DL<=0.05 MIU/L-ACNC: 0.55 UIU/ML (ref 0.27–4.2)
UROBILINOGEN, URINE: 0.2 E.U./DL
VITAMIN D 25-HYDROXY: 32 NG/ML (ref 30–100)
VLDLC SERPL CALC-MCNC: 38 MG/DL
WBC # BLD: 7 E9/L (ref 4.5–11.5)
WBC UA: ABNORMAL /HPF (ref 0–5)

## 2021-01-11 ENCOUNTER — OFFICE VISIT (OUTPATIENT)
Dept: PRIMARY CARE CLINIC | Age: 71
End: 2021-01-11
Payer: MEDICARE

## 2021-01-11 VITALS
BODY MASS INDEX: 28.27 KG/M2 | WEIGHT: 144 LBS | TEMPERATURE: 98.1 F | HEIGHT: 60 IN | DIASTOLIC BLOOD PRESSURE: 30 MMHG | SYSTOLIC BLOOD PRESSURE: 135 MMHG

## 2021-01-11 DIAGNOSIS — E78.2 MIXED HYPERLIPIDEMIA: ICD-10-CM

## 2021-01-11 DIAGNOSIS — R73.03 PRE-DIABETES: ICD-10-CM

## 2021-01-11 DIAGNOSIS — M81.0 AGE-RELATED OSTEOPOROSIS WITHOUT CURRENT PATHOLOGICAL FRACTURE: ICD-10-CM

## 2021-01-11 DIAGNOSIS — I10 ESSENTIAL HYPERTENSION: Chronic | ICD-10-CM

## 2021-01-11 DIAGNOSIS — E03.9 ACQUIRED HYPOTHYROIDISM: Chronic | ICD-10-CM

## 2021-01-11 DIAGNOSIS — K21.9 GASTROESOPHAGEAL REFLUX DISEASE WITHOUT ESOPHAGITIS: ICD-10-CM

## 2021-01-11 DIAGNOSIS — H92.01 RIGHT EAR PAIN: ICD-10-CM

## 2021-01-11 DIAGNOSIS — Z00.00 ROUTINE GENERAL MEDICAL EXAMINATION AT A HEALTH CARE FACILITY: Primary | ICD-10-CM

## 2021-01-11 DIAGNOSIS — F41.9 ANXIETY: ICD-10-CM

## 2021-01-11 PROCEDURE — 1123F ACP DISCUSS/DSCN MKR DOCD: CPT | Performed by: FAMILY MEDICINE

## 2021-01-11 PROCEDURE — 4040F PNEUMOC VAC/ADMIN/RCVD: CPT | Performed by: FAMILY MEDICINE

## 2021-01-11 PROCEDURE — 3017F COLORECTAL CA SCREEN DOC REV: CPT | Performed by: FAMILY MEDICINE

## 2021-01-11 PROCEDURE — G8482 FLU IMMUNIZE ORDER/ADMIN: HCPCS | Performed by: FAMILY MEDICINE

## 2021-01-11 PROCEDURE — G0439 PPPS, SUBSEQ VISIT: HCPCS | Performed by: FAMILY MEDICINE

## 2021-01-11 RX ORDER — LEVOTHYROXINE SODIUM 0.1 MG/1
TABLET ORAL
Qty: 50 TABLET | Refills: 12 | Status: SHIPPED
Start: 2021-01-11 | End: 2022-01-21 | Stop reason: SDUPTHER

## 2021-01-11 RX ORDER — FLUTICASONE PROPIONATE 50 MCG
1 SPRAY, SUSPENSION (ML) NASAL 2 TIMES DAILY
Qty: 1 BOTTLE | Refills: 12 | Status: SHIPPED
Start: 2021-01-11 | End: 2022-02-22 | Stop reason: SDUPTHER

## 2021-01-11 RX ORDER — METOPROLOL SUCCINATE 25 MG/1
12.5 TABLET, EXTENDED RELEASE ORAL DAILY
Qty: 90 TABLET | Refills: 3 | Status: SHIPPED
Start: 2021-01-11 | End: 2021-10-14 | Stop reason: DRUGHIGH

## 2021-01-11 RX ORDER — LEVOTHYROXINE SODIUM 88 UG/1
TABLET ORAL
Qty: 80 TABLET | Refills: 12 | Status: SHIPPED
Start: 2021-01-11 | End: 2022-01-21 | Stop reason: SDUPTHER

## 2021-01-11 RX ORDER — FLUOXETINE 10 MG/1
10 CAPSULE ORAL DAILY
Qty: 90 CAPSULE | Refills: 3 | Status: SHIPPED
Start: 2021-01-11 | End: 2022-01-21 | Stop reason: SDUPTHER

## 2021-01-11 RX ORDER — OMEPRAZOLE 40 MG/1
40 CAPSULE, DELAYED RELEASE ORAL
Qty: 30 CAPSULE | Refills: 0 | Status: SHIPPED | OUTPATIENT
Start: 2021-01-11 | End: 2021-07-14

## 2021-01-11 ASSESSMENT — PATIENT HEALTH QUESTIONNAIRE - PHQ9
SUM OF ALL RESPONSES TO PHQ QUESTIONS 1-9: 0
1. LITTLE INTEREST OR PLEASURE IN DOING THINGS: 0
SUM OF ALL RESPONSES TO PHQ QUESTIONS 1-9: 0

## 2021-01-11 ASSESSMENT — LIFESTYLE VARIABLES
AUDIT-C TOTAL SCORE: INCOMPLETE
HOW OFTEN DO YOU HAVE A DRINK CONTAINING ALCOHOL: NEVER
AUDIT TOTAL SCORE: INCOMPLETE
HOW OFTEN DO YOU HAVE A DRINK CONTAINING ALCOHOL: 0

## 2021-01-11 NOTE — PROGRESS NOTES
Medicare Annual Wellness Visit  Name: Lisa Cox Date: 2021   MRN: [id number] Sex: Female   Age: 65 y.o. Ethnicity: Non-/Non    : 1950 Race: Duane Govea is here for Medicare AWV, Hypertension, and Hyperlipidemia      6 ,mo  Ck  Re  bp  Chol  thryoid      thryoid ok  On  80   4  D and   100  Three days         Feel ok no cp ro sob     Chol inc   ghb  D ec      Right ear pain for yrs  And sl flare      gerd for few weeks an dask for   emd for short time    ifnot beter  In two weks call for   egd    Screenings for behavioral, psychosocial and functional/safety risks, and cognitive dysfunction are all negative except as indicated below. These results, as well as other patient data from the [address] form, are documented in Flowsheets linked to this Encounter. Allergies   Allergen Reactions    Azithromycin      Stomach pains    Doxycycline      Stomach pains    Zanaflex [Tizanidine]      Stomach pains         Prior to Visit Medications    Medication Sig Taking?  Authorizing Provider   FLUoxetine (PROZAC) 10 MG capsule Take 1 capsule by mouth daily Yes Valeriano Robbins DO   metoprolol succinate (TOPROL XL) 25 MG extended release tablet Take 0.5 tablets by mouth daily Yes Valeriano Robbins DO   fluticasone (FLONASE) 50 MCG/ACT nasal spray 1 spray by Each Nostril route 2 times daily Yes Valeriano Robbins DO   levothyroxine (SYNTHROID) 88 MCG tablet One four times a week Yes Valeriano Robbins DO   levothyroxine (SYNTHROID) 100 MCG tablet One three times a week Yes Valeriano Robbins DO   omeprazole (PRILOSEC) 40 MG delayed release capsule Take 1 capsule by mouth every morning (before breakfast) Yes Valeriano Robbins, DO   Multiple Vitamins-Minerals (THERAPEUTIC MULTIVITAMIN-MINERALS) tablet Take 1 tablet by mouth daily  Historical Provider, MD   Turmeric 500 MG TABS Take by mouth  Historical Provider, MD Cranberry-Cholecalciferol 4200-500 MG-UNIT CAPS Take by mouth  Historical Provider, MD   calcium carbonate 600 MG TABS tablet Take 1 tablet by mouth daily  Historical Provider, MD   Cyanocobalamin (VITAMIN B-12) 1000 MCG SUBL Place under the tongue  Historical Provider, MD         Past Medical History:   Diagnosis Date    Abdominal pain, other specified site 6/6/2012    Allergic urticaria     [facility] ([facility]     1999 right breast cancer / treated with surgery and chemo    Depression 2000    Diet-controlled diabetes mellitus ([facility]     Hyperlipidemia     DENIES    Hypothyroidism     Myofascial pain syndrome 2017    Pelvic - Dr Christophe Mancera Osteoarthritis cervical spine     cervical spine    Pancreat[facility] cancer [facility] 2007    [facility]    Pancreatitis, acute 6/6/2012    Prolonged emergence from general anesthesia        Past Surgical History:   Procedure Laterality Date    BREAST RECONSTRUCTION Bilateral 7/25/2019    CAPSULECTOMY OF LEFT BREAST WITH REMOVAL OF SALINE  IMPLANTS AND REPLACMENT WITH BILATERAL SILICONE IMPLANTS performed by Randall Panda MD at [address] [place] [address]    radical mastectomy r / mastectomy at left    CATARACT REMOVAL Bilateral 2012   [facility] [address]    CHOLECYSTECTOMY  2007    with Tristan Gunning  2015    Dr. Haridner Bravo    to repair muscle     HERNIA REPAIR Right years ago    inguinal    HYSTERECTOMY  1978    OTHER SURGICAL HISTORY Right 10/18/2017    EXCISION GANGLION CYST RIGHT MIDDLE FINGER    PANCREATECTOMY  2007    Whipple 1/3 taken          Family History   Problem Relation Age of Onset    Coronary Art Dis Mother     Diabetes Mother     Colon Cancer Father     Diabetes Other     Colon Cancer Other     Atrial Fibrillation Brother     High Blood Pressure Brother     High Cholesterol Brother     High Cholesterol Brother  High Blood Pressure Brother        CareTeam (Including outside providers/suppliers regularly involved in providing care):   Patient Care Team:  Fabricio Albert DO as PCP - General (Family Medicine)  Fabricio Albert DO as PCP - [facility]  Bisi Ledesma DO as Consulting Physician (Cardiology)    Wt Readings from Last 3 Encounters:   01/11/21 144 lb (65.3 kg)   12/03/20 145 lb (65.8 kg)   11/30/20 146 lb 9.6 oz (66.5 kg)     Vitals:    01/11/21 0757   BP: (!) 135/30   Temp: 98.1 °F (36.7 °C)   TempSrc: Oral   Weight: 144 lb (65.3 kg)   Height: 5' (1.524 m)     Body mass index is 28.12 kg/m². Based upon direct observation of the patient, evaluation of cognition reveals recent and remote memory intact.     General Appearance: alert and oriented to person, place and time, well developed and well- nourished, in no acute distress  Skin: warm and dry, no rash or erythema  Head: normocephalic and atraumatic  Eyes: pupils equal, round, and reactive to light, extraocular eye movements intact, conjunctivae normal  ENT: tympanic membrane, external ear and ear canal normal bilaterally, nose without deformity, nasal mucosa and turbinates normal without polyps  Neck: supple and non-tender without mass, no thyromegaly or thyroid nodules, no cervical lymphadenopathy  Pulmonary/Chest: clear to auscultation bilaterally- no wheezes, rales or rhonchi, normal air movement, no respiratory distress  Cardiovascular: normal rate, regular rhythm, normal S1 and S2, no murmurs, rubs, clicks, or gallops, distal pulses intact, no carotid bruits  Abdomen: soft, non-tender, non-distended, normal bowel sounds, no masses or organomegaly  Extremities: no cyanosis, clubbing or edema  Musculoskeletal: normal range of motion, no joint swelling, deformity or tenderness  Neurologic: reflexes normal and symmetric, no cranial nerve deficit, gait, coordination and speech normal Gastroesophageal reflux disease without esophagitis  -     omeprazole (PRILOSEC) 40 MG delayed release capsule; Take 1 capsule by mouth every morning (before breakfast)    Other orders  -     fluticasone (FLONASE) 50 MCG/ACT nasal spray; 1 spray by Each Nostril route 2 times daily             6 mo lab  Before     Diet e xer  lwo  Fat diet  Back to ecre  lwo sugar di et      A great deal of time spent reviewing medications, diet, exercise, social issues. Also reviewing the chart before entering the room with patient and finishing charting after leaving patient's room. More than half of that time was spent face to face with the patient in counseling and coordinating care.         appt    isidoro         6 mo

## 2021-01-11 NOTE — PATIENT INSTRUCTIONS
Personalized Preventive Plan for Tab Figueroa - 1/11/2021  Medicare offers a range of preventive health benefits. Some of the tests and screenings are paid in full while other may be subject to a deductible, co-insurance, and/or copay. Some of these benefits include a comprehensive review of your medical history including lifestyle, illnesses that may run in your family, and various assessments and screenings as appropriate. After reviewing your medical record and screening and assessments performed today your provider may have ordered immunizations, labs, imaging, and/or referrals for you. A list of these orders (if applicable) as well as your Preventive Care list are included within your After Visit Summary for your review. Other Preventive Recommendations:    · A preventive eye exam performed by an eye specialist is recommended every 1-2 years to screen for glaucoma; cataracts, macular degeneration, and other eye disorders. · A preventive dental visit is recommended every 6 months. · Try to get at least 150 minutes of exercise per week or 10,000 steps per day on a pedometer . · Order or download the FREE \"Exercise & Physical Activity: Your Everyday Guide\" from [employer]. Call 8-618.810.1340 or search [employer] online. · You need 3033-8640 mg of calcium and 8361-2728 IU of vitamin D per day. It is possible to meet your calcium requirement with diet alone, but a vitamin D supplement is usually necessary to meet this goal.  · When exposed to the sun, use a sunscreen that protects against both UVA and UVB radiation with an SPF of 30 or greater. Reapply every 2 to 3 hours or after sweating, drying off with a towel, or swimming. · Always wear a seat belt when traveling in a car. Always wear a helmet when riding a bicycle or motorcycle.

## 2021-01-13 ENCOUNTER — TELEPHONE (OUTPATIENT)
Dept: PRIMARY CARE CLINIC | Age: 71
End: 2021-01-13

## 2021-02-01 ENCOUNTER — IMMUNIZATION (OUTPATIENT)
Dept: PRIMARY CARE CLINIC | Age: 71
End: 2021-02-01
Payer: MEDICARE

## 2021-02-01 DIAGNOSIS — Z23 NEED FOR VACCINATION: Primary | ICD-10-CM

## 2021-02-01 PROCEDURE — 0001A COVID-19, PFIZER VACCINE 30MCG/0.3ML DOSE: CPT | Performed by: CLINICAL NURSE SPECIALIST

## 2021-02-01 PROCEDURE — 91300 COVID-19, PFIZER VACCINE 30MCG/0.3ML DOSE: CPT | Performed by: CLINICAL NURSE SPECIALIST

## 2021-02-22 ENCOUNTER — IMMUNIZATION (OUTPATIENT)
Dept: PRIMARY CARE CLINIC | Age: 71
End: 2021-02-22
Payer: MEDICARE

## 2021-02-22 PROCEDURE — 0002A COVID-19, PFIZER VACCINE 30MCG/0.3ML DOSE: CPT | Performed by: CLINICAL NURSE SPECIALIST

## 2021-02-22 PROCEDURE — 91300 COVID-19, PFIZER VACCINE 30MCG/0.3ML DOSE: CPT | Performed by: CLINICAL NURSE SPECIALIST

## 2021-03-12 ENCOUNTER — OFFICE VISIT (OUTPATIENT)
Dept: PRIMARY CARE CLINIC | Age: 71
End: 2021-03-12
Payer: MEDICARE

## 2021-03-12 VITALS
WEIGHT: 147 LBS | BODY MASS INDEX: 28.71 KG/M2 | TEMPERATURE: 97.9 F | SYSTOLIC BLOOD PRESSURE: 135 MMHG | DIASTOLIC BLOOD PRESSURE: 82 MMHG

## 2021-03-12 DIAGNOSIS — M15.9 PRIMARY OSTEOARTHRITIS INVOLVING MULTIPLE JOINTS: ICD-10-CM

## 2021-03-12 DIAGNOSIS — I10 ESSENTIAL HYPERTENSION: Chronic | ICD-10-CM

## 2021-03-12 DIAGNOSIS — M48.02 CERVICAL SPINAL STENOSIS: Primary | ICD-10-CM

## 2021-03-12 DIAGNOSIS — M51.26 LUMBAR HERNIATED DISC: ICD-10-CM

## 2021-03-12 PROBLEM — M15.0 PRIMARY OSTEOARTHRITIS INVOLVING MULTIPLE JOINTS: Status: ACTIVE | Noted: 2021-03-12

## 2021-03-12 PROCEDURE — G8482 FLU IMMUNIZE ORDER/ADMIN: HCPCS | Performed by: FAMILY MEDICINE

## 2021-03-12 PROCEDURE — 4040F PNEUMOC VAC/ADMIN/RCVD: CPT | Performed by: FAMILY MEDICINE

## 2021-03-12 PROCEDURE — G8417 CALC BMI ABV UP PARAM F/U: HCPCS | Performed by: FAMILY MEDICINE

## 2021-03-12 PROCEDURE — 1123F ACP DISCUSS/DSCN MKR DOCD: CPT | Performed by: FAMILY MEDICINE

## 2021-03-12 PROCEDURE — 3017F COLORECTAL CA SCREEN DOC REV: CPT | Performed by: FAMILY MEDICINE

## 2021-03-12 PROCEDURE — 1090F PRES/ABSN URINE INCON ASSESS: CPT | Performed by: FAMILY MEDICINE

## 2021-03-12 PROCEDURE — G8399 PT W/DXA RESULTS DOCUMENT: HCPCS | Performed by: FAMILY MEDICINE

## 2021-03-12 PROCEDURE — G8428 CUR MEDS NOT DOCUMENT: HCPCS | Performed by: FAMILY MEDICINE

## 2021-03-12 PROCEDURE — 99214 OFFICE O/P EST MOD 30 MIN: CPT | Performed by: FAMILY MEDICINE

## 2021-03-12 PROCEDURE — 1036F TOBACCO NON-USER: CPT | Performed by: FAMILY MEDICINE

## 2021-03-12 ASSESSMENT — ENCOUNTER SYMPTOMS
ALLERGIC/IMMUNOLOGIC NEGATIVE: 1
RESPIRATORY NEGATIVE: 1
GASTROINTESTINAL NEGATIVE: 1
EYES NEGATIVE: 1

## 2021-03-12 ASSESSMENT — PATIENT HEALTH QUESTIONNAIRE - PHQ9
SUM OF ALL RESPONSES TO PHQ9 QUESTIONS 1 & 2: 0
2. FEELING DOWN, DEPRESSED OR HOPELESS: 0

## 2021-03-12 NOTE — PROGRESS NOTES
3/12/21  Name: Sunny Maxwell    : 1950    Sex: female    Age: 65 y.o. Subjective:  Chief Complaint: Patient is here for left shoulder pain and low back pain     bilat low back pain    And  Left shoudler pain     Pain in both legs when  She  Walks more then  Ten minutes  Neck pain and spasm    Mri cerv spine  2016 with mod spianl stenois      Review of Systems   Constitutional: Negative. HENT: Negative. Eyes: Negative. Respiratory: Negative. Cardiovascular: Negative. Gastrointestinal: Negative. Endocrine: Negative. Genitourinary: Negative. Musculoskeletal:        See    hpi   Skin: Negative. Allergic/Immunologic: Negative. Neurological: Negative. Hematological: Negative. Psychiatric/Behavioral: Negative.           Current Outpatient Medications:     FLUoxetine (PROZAC) 10 MG capsule, Take 1 capsule by mouth daily, Disp: 90 capsule, Rfl: 3    metoprolol succinate (TOPROL XL) 25 MG extended release tablet, Take 0.5 tablets by mouth daily, Disp: 90 tablet, Rfl: 3    fluticasone (FLONASE) 50 MCG/ACT nasal spray, 1 spray by Each Nostril route 2 times daily, Disp: 1 Bottle, Rfl: 12    levothyroxine (SYNTHROID) 88 MCG tablet, One four times a week, Disp: 80 tablet, Rfl: 12    levothyroxine (SYNTHROID) 100 MCG tablet, One three times a week, Disp: 50 tablet, Rfl: 12    omeprazole (PRILOSEC) 40 MG delayed release capsule, Take 1 capsule by mouth every morning (before breakfast), Disp: 30 capsule, Rfl: 0    Multiple Vitamins-Minerals (THERAPEUTIC MULTIVITAMIN-MINERALS) tablet, Take 1 tablet by mouth daily, Disp: , Rfl:     Turmeric 500 MG TABS, Take by mouth, Disp: , Rfl:     Cranberry-Cholecalciferol 4200-500 MG-UNIT CAPS, Take by mouth, Disp: , Rfl:     calcium carbonate 600 MG TABS tablet, Take 1 tablet by mouth daily, Disp: , Rfl:     Cyanocobalamin (VITAMIN B-12) 1000 MCG SUBL, Place under the tongue, Disp: , Rfl:   Allergies   Allergen Reactions    Azithromycin      Stomach pains    Doxycycline      Stomach pains    Zanaflex [Tizanidine]      Stomach pains     Social History     Socioeconomic History    Marital status:      Spouse name: Not on file    Number of children: Not on file    Years of education: Not on file    Highest education level: Not on file   Occupational History    Not on file   Social Needs    Financial resource strain: Not on file    Food insecurity     Worry: Not on file     Inability: Not on file    Transportation needs     Medical: Not on file     Non-medical: Not on file   Tobacco Use    Smoking status: Never Smoker    Smokeless tobacco: Never Used   Substance and Sexual Activity    Alcohol use: No    Drug use: No    Sexual activity: Not on file   Lifestyle    Physical activity     Days per week: Not on file     Minutes per session: Not on file    Stress: Not on file   Relationships    Social connections     Talks on phone: Not on file     Gets together: Not on file     Attends Buddhism service: Not on file     Active member of club or organization: Not on file     Attends meetings of clubs or organizations: Not on file     Relationship status: Not on file    Intimate partner violence     Fear of current or ex partner: Not on file     Emotionally abused: Not on file     Physically abused: Not on file     Forced sexual activity: Not on file   Other Topics Concern    Not on file   Social History Narrative        [address] [facility] 2007 [address] [facility] but later found not to be cancer    Sjqueta Calin  1950 Page #2    RIGHT BREAST CA WITH 13 POSITIVE LYMPH NODES  WITH BILATERAL MASTECTOMY AND    IMPLANTS. ---SEES DR ALBERT    Hysterectomy, left 1 ovary    right inguinal hernia repair    cataracts with IOL bilateral    FAMILY HISTORY OF COLON CA, DIABETES DIET-CONTROLLED DIABETES    ELEVATED LIVER FUNCTION    HYPERLIPIDEMIA, ON STATINS IN THE PAST    LAST A1C 6.0    LAST CHOLESTEROL 231    CERV DISC OR DR ALBERT ---1993    ALLERGIES IN THE PAST. SAW DR. 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, THE    DENTIST    COLONOSCOPY 2015---DR RIVER---DIFFICULT TO PASS--PT REFUSES ANY MORE    MRI 11-16 WITH CERVICAL MOD SPINAL STENOSIS---EVAL DR GEMMA--- PT CANCELLED    SHOT    USE TO WORK WITH [employer]    CT HEAD WITH ANGIOMA---4-17 PT STATES WAS THERE YRS AGO    MYOFACIAL PELVIC PAIN SYNDROME 7-17 DR GARCIA----    RIGHT THIRD FINGER OR DR GONZALEZ 10-17    EVAL CARDIO WITH NEG ECHO 10-17 DR JOSE BAPTISTE    PREVAR 2017    EVAL DR CEJA----- [facility] THOUGHT ANAL-VAG FISTULA BUT MARCIAL DOUBTS- 1-19    NO UTI SINCE USING VAG COCUNUT OIL 1-19    egd  1-20  Dr Barahona   gasgritis      Past Medical History:   Diagnosis Date    Abdominal pain, other specified site 6/6/2012    Allergic urticaria     Cancer ([address]     1999 right breast cancer / treated with surgery and chemo    Depression 2000    Diet-controlled diabetes mellitus ([address]     Hyperlipidemia     DENIES    Hypothyroidism     Myofascial pain syndrome 2017    Pelvic - Dr Tono Maynard    Osteoarthritis cervical spine     cervical spine    Pancreatic cancer ([address] 2007    [facility]    Pancreatitis, acute 6/6/2012    Prolonged emergence from general anesthesia      Family History   Problem Relation Age of Onset    Coronary Art Dis Mother     Diabetes Mother     Colon Cancer Father     Diabetes Other     Colon Cancer Other     Atrial Fibrillation Brother     High Blood Pressure Brother     High Cholesterol Brother     High Cholesterol Brother     High Blood Pressure Brother       Past Surgical History:   Procedure Laterality Date    BREAST RECONSTRUCTION Bilateral 7/25/2019    CAPSULECTOMY OF LEFT BREAST WITH REMOVAL OF SALINE  IMPLANTS AND REPLACMENT WITH BILATERAL SILICONE IMPLANTS performed by Marlena Montesinos MD at [address]    radical mastectomy r / mastectomy at left    [facility] [address] [address]    CHOLECYSTECTOMY  2007    with [facility]  2015    Dr. Crockett Nest    to repair muscle     HERNIA REPAIR Right years ago    inguinal    HYSTERECTOMY  1978    OTHER SURGICAL HISTORY Right 10/18/2017    EXCISION GANGLION CYST RIGHT MIDDLE FINGER    PANCREATECTOMY  2007    Whipple 1/3 taken       Vitals:    03/12/21 0952   BP: 135/82   Temp: 97.9 °F (36.6 °C)   TempSrc: Oral   Weight: 147 lb (66.7 kg)       Objective:    Physical Exam  Vitals signs reviewed. Constitutional:       Appearance: She is well-developed. HENT:      Head: Normocephalic. Eyes:      Pupils: Pupils are equal, round, and reactive to light. Neck:      Musculoskeletal: Normal range of motion. Cardiovascular:      Rate and Rhythm: Normal rate and regular rhythm. Pulmonary:      Effort: Pulmonary effort is normal.      Breath sounds: Normal breath sounds. Abdominal:      Palpations: Abdomen is soft. Musculoskeletal:      Comments: Pars  cevc spasm with dec flex  50%    With spsm     bilat  si tender with palp      Dec  dtr both  Lower exterem        Upper extrem weaknes  Left more then right   Skin:     General: Skin is warm. Neurological:      Mental Status: She is alert and oriented to person, place, and time. Psychiatric:         Behavior: Behavior normal.         Zuleika Fernandez was seen today for back pain. Diagnoses and all orders for this visit:    Cervical spinal stenosis  -     XR CERVICAL SPINE (2-3 VIEWS); Future  -     XR THORACIC SPINE (3 VIEWS);  Future  -     MRI CERVICAL

## 2021-03-13 ENCOUNTER — TELEPHONE (OUTPATIENT)
Dept: PRIMARY CARE CLINIC | Age: 71
End: 2021-03-13

## 2021-03-13 NOTE — TELEPHONE ENCOUNTER
Notify patient that the x-rays of the neck and mid back show arthritis but the low back shows severe area of degenerative disc disease.   We will wait and see what the MRI shows

## 2021-03-15 ENCOUNTER — TELEPHONE (OUTPATIENT)
Dept: PRIMARY CARE CLINIC | Age: 71
End: 2021-03-15

## 2021-03-15 NOTE — TELEPHONE ENCOUNTER
Pt states MRI was ordered w and w out contrast. Radiologist told pt he shouldn't have MRI due to neck fusion and you agreed. pt states she would like to have MRI done as shes had them in the past w contrast and has not had any issues.  Please advise

## 2021-03-26 ENCOUNTER — HOSPITAL ENCOUNTER (OUTPATIENT)
Dept: MRI IMAGING | Age: 71
Discharge: HOME | End: 2021-03-28
Payer: MEDICARE

## 2021-03-26 ENCOUNTER — HOSPITAL ENCOUNTER (OUTPATIENT)
Age: 71
Discharge: HOME | End: 2021-03-26
Payer: MEDICARE

## 2021-03-26 DIAGNOSIS — R73.03 PRE-DIABETES: ICD-10-CM

## 2021-03-26 DIAGNOSIS — I10 ESSENTIAL HYPERTENSION: Chronic | ICD-10-CM

## 2021-03-26 DIAGNOSIS — M51.26 LUMBAR HERNIATED DISC: ICD-10-CM

## 2021-03-26 DIAGNOSIS — E78.2 MIXED HYPERLIPIDEMIA: ICD-10-CM

## 2021-03-26 DIAGNOSIS — M48.02 CERVICAL SPINAL STENOSIS: ICD-10-CM

## 2021-03-26 DIAGNOSIS — E03.9 ACQUIRED HYPOTHYROIDISM: Chronic | ICD-10-CM

## 2021-03-26 DIAGNOSIS — M81.0 AGE-RELATED OSTEOPOROSIS WITHOUT CURRENT PATHOLOGICAL FRACTURE: ICD-10-CM

## 2021-03-26 LAB
ALBUMIN SERPL-MCNC: 4.3 G/DL (ref 3.5–5.2)
ALP BLD-CCNC: 109 U/L (ref 35–104)
ALT SERPL-CCNC: 14 U/L (ref 0–32)
ANION GAP SERPL CALCULATED.3IONS-SCNC: 7 MMOL/L (ref 7–16)
AST SERPL-CCNC: 18 U/L (ref 0–31)
BACTERIA: ABNORMAL /HPF
BASOPHILS ABSOLUTE: 0.04 E9/L (ref 0–0.2)
BASOPHILS RELATIVE PERCENT: 0.6 % (ref 0–2)
BILIRUB SERPL-MCNC: 0.5 MG/DL (ref 0–1.2)
BILIRUBIN URINE: NEGATIVE
BLOOD, URINE: NORMAL
BUN BLDV-MCNC: 16 MG/DL (ref 8–23)
CALCIUM SERPL-MCNC: 9.5 MG/DL (ref 8.6–10.2)
CHLORIDE BLD-SCNC: 102 MMOL/L (ref 98–107)
CHOLESTEROL, TOTAL: 218 MG/DL (ref 0–199)
CLARITY: CLEAR
CO2: 29 MMOL/L (ref 22–29)
COLOR: YELLOW
CREAT SERPL-MCNC: 0.8 MG/DL (ref 0.5–1)
EOSINOPHILS ABSOLUTE: 0.25 E9/L (ref 0.05–0.5)
EOSINOPHILS RELATIVE PERCENT: 3.4 % (ref 0–6)
GFR AFRICAN AMERICAN: >60
GFR NON-AFRICAN AMERICAN: >60 ML/MIN/1.73
GLUCOSE BLD-MCNC: 97 MG/DL (ref 74–99)
GLUCOSE URINE: NEGATIVE MG/DL
HBA1C MFR BLD: 5.4 % (ref 4–5.6)
HCT VFR BLD CALC: 41.2 % (ref 34–48)
HDLC SERPL-MCNC: 62 MG/DL
HEMOGLOBIN: 12.8 G/DL (ref 11.5–15.5)
IMMATURE GRANULOCYTES #: 0.03 E9/L
IMMATURE GRANULOCYTES %: 0.4 % (ref 0–5)
KETONES, URINE: NEGATIVE MG/DL
LDL CHOLESTEROL CALCULATED: 126 MG/DL (ref 0–99)
LEUKOCYTE ESTERASE, URINE: NEGATIVE
LYMPHOCYTES ABSOLUTE: 1.66 E9/L (ref 1.5–4)
LYMPHOCYTES RELATIVE PERCENT: 22.9 % (ref 20–42)
MCH RBC QN AUTO: 27.4 PG (ref 26–35)
MCHC RBC AUTO-ENTMCNC: 31.1 % (ref 32–34.5)
MCV RBC AUTO: 88 FL (ref 80–99.9)
MONOCYTES ABSOLUTE: 0.58 E9/L (ref 0.1–0.95)
MONOCYTES RELATIVE PERCENT: 8 % (ref 2–12)
NEUTROPHILS ABSOLUTE: 4.69 E9/L (ref 1.8–7.3)
NEUTROPHILS RELATIVE PERCENT: 64.7 % (ref 43–80)
NITRITE, URINE: NEGATIVE
PDW BLD-RTO: 13.1 FL (ref 11.5–15)
PH UA: 6.5 (ref 5–9)
PLATELET # BLD: 304 E9/L (ref 130–450)
PMV BLD AUTO: 10.7 FL (ref 7–12)
POTASSIUM SERPL-SCNC: 5.3 MMOL/L (ref 3.5–5)
PROTEIN UA: NEGATIVE MG/DL
RBC # BLD: 4.68 E12/L (ref 3.5–5.5)
RBC UA: ABNORMAL /HPF (ref 0–2)
SODIUM BLD-SCNC: 138 MMOL/L (ref 132–146)
SPECIFIC GRAVITY UA: 1.01 (ref 1–1.03)
T4 TOTAL: 10.6 MCG/DL (ref 4.5–11.7)
TOTAL PROTEIN: 7.1 G/DL (ref 6.4–8.3)
TRIGL SERPL-MCNC: 150 MG/DL (ref 0–149)
TSH SERPL DL<=0.05 MIU/L-ACNC: 0.29 UIU/ML (ref 0.27–4.2)
UROBILINOGEN, URINE: 0.2 E.U./DL
VITAMIN D 25-HYDROXY: 33 NG/ML (ref 30–100)
VLDLC SERPL CALC-MCNC: 30 MG/DL
WBC # BLD: 7.3 E9/L (ref 4.5–11.5)
WBC UA: ABNORMAL /HPF (ref 0–5)

## 2021-03-26 PROCEDURE — 85025 COMPLETE CBC W/AUTO DIFF WBC: CPT

## 2021-03-26 PROCEDURE — 81001 URINALYSIS AUTO W/SCOPE: CPT

## 2021-03-26 PROCEDURE — 82306 VITAMIN D 25 HYDROXY: CPT

## 2021-03-26 PROCEDURE — 83036 HEMOGLOBIN GLYCOSYLATED A1C: CPT

## 2021-03-26 PROCEDURE — 84443 ASSAY THYROID STIM HORMONE: CPT

## 2021-03-26 PROCEDURE — 84436 ASSAY OF TOTAL THYROXINE: CPT

## 2021-03-26 PROCEDURE — A9577 INJ MULTIHANCE: HCPCS | Performed by: RADIOLOGY

## 2021-03-26 PROCEDURE — 80053 COMPREHEN METABOLIC PANEL: CPT

## 2021-03-26 PROCEDURE — 72158 MRI LUMBAR SPINE W/O & W/DYE: CPT

## 2021-03-26 PROCEDURE — 72156 MRI NECK SPINE W/O & W/DYE: CPT

## 2021-03-26 PROCEDURE — 36415 COLL VENOUS BLD VENIPUNCTURE: CPT

## 2021-03-26 PROCEDURE — 80061 LIPID PANEL: CPT

## 2021-03-26 PROCEDURE — 6360000004 HC RX CONTRAST MEDICATION: Performed by: RADIOLOGY

## 2021-03-26 RX ADMIN — GADOBENATE DIMEGLUMINE 13 ML: 529 INJECTION, SOLUTION INTRAVENOUS at 14:06

## 2021-03-30 ENCOUNTER — TELEPHONE (OUTPATIENT)
Dept: PRIMARY CARE CLINIC | Age: 71
End: 2021-03-30

## 2021-04-06 ENCOUNTER — OFFICE VISIT (OUTPATIENT)
Dept: FAMILY MEDICINE CLINIC | Age: 71
End: 2021-04-06
Payer: MEDICARE

## 2021-04-06 VITALS — BODY MASS INDEX: 29.06 KG/M2 | TEMPERATURE: 98.4 F | HEIGHT: 60 IN | WEIGHT: 148 LBS

## 2021-04-06 DIAGNOSIS — J02.0 ACUTE STREPTOCOCCAL PHARYNGITIS: Primary | ICD-10-CM

## 2021-04-06 DIAGNOSIS — J01.80 ACUTE NON-RECURRENT SINUSITIS OF OTHER SINUS: ICD-10-CM

## 2021-04-06 LAB — S PYO AG THROAT QL: NORMAL

## 2021-04-06 PROCEDURE — 1090F PRES/ABSN URINE INCON ASSESS: CPT | Performed by: FAMILY MEDICINE

## 2021-04-06 PROCEDURE — G8428 CUR MEDS NOT DOCUMENT: HCPCS | Performed by: FAMILY MEDICINE

## 2021-04-06 PROCEDURE — 87880 STREP A ASSAY W/OPTIC: CPT | Performed by: FAMILY MEDICINE

## 2021-04-06 PROCEDURE — 99213 OFFICE O/P EST LOW 20 MIN: CPT | Performed by: FAMILY MEDICINE

## 2021-04-06 PROCEDURE — 4040F PNEUMOC VAC/ADMIN/RCVD: CPT | Performed by: FAMILY MEDICINE

## 2021-04-06 PROCEDURE — G8417 CALC BMI ABV UP PARAM F/U: HCPCS | Performed by: FAMILY MEDICINE

## 2021-04-06 PROCEDURE — 1123F ACP DISCUSS/DSCN MKR DOCD: CPT | Performed by: FAMILY MEDICINE

## 2021-04-06 PROCEDURE — 1036F TOBACCO NON-USER: CPT | Performed by: FAMILY MEDICINE

## 2021-04-06 PROCEDURE — G8399 PT W/DXA RESULTS DOCUMENT: HCPCS | Performed by: FAMILY MEDICINE

## 2021-04-06 PROCEDURE — 3017F COLORECTAL CA SCREEN DOC REV: CPT | Performed by: FAMILY MEDICINE

## 2021-04-06 RX ORDER — CEPHALEXIN 500 MG/1
500 CAPSULE ORAL 3 TIMES DAILY
Qty: 21 CAPSULE | Refills: 0 | Status: SHIPPED
Start: 2021-04-06 | End: 2021-07-14

## 2021-04-06 ASSESSMENT — ENCOUNTER SYMPTOMS: RESPIRATORY NEGATIVE: 1

## 2021-04-06 NOTE — PROGRESS NOTES
21  Name: Blase Boxer    : 1950    Sex: female    Age: 65 y.o. Subjective:  Chief Complaint: Patient is here for sore throat  Sinus    Ear presure     Three d      No  tmep chills no  lsos of taste  Fast     Strep neg      Review of Systems   HENT:        See  hpi   Respiratory: Negative. Cardiovascular: Negative.           Current Outpatient Medications:     FLUoxetine (PROZAC) 10 MG capsule, Take 1 capsule by mouth daily, Disp: 90 capsule, Rfl: 3    metoprolol succinate (TOPROL XL) 25 MG extended release tablet, Take 0.5 tablets by mouth daily, Disp: 90 tablet, Rfl: 3    fluticasone (FLONASE) 50 MCG/ACT nasal spray, 1 spray by Each Nostril route 2 times daily, Disp: 1 Bottle, Rfl: 12    levothyroxine (SYNTHROID) 88 MCG tablet, One four times a week, Disp: 80 tablet, Rfl: 12    levothyroxine (SYNTHROID) 100 MCG tablet, One three times a week, Disp: 50 tablet, Rfl: 12    omeprazole (PRILOSEC) 40 MG delayed release capsule, Take 1 capsule by mouth every morning (before breakfast), Disp: 30 capsule, Rfl: 0    Multiple Vitamins-Minerals (THERAPEUTIC MULTIVITAMIN-MINERALS) tablet, Take 1 tablet by mouth daily, Disp: , Rfl:     Turmeric 500 MG TABS, Take by mouth, Disp: , Rfl:     Cranberry-Cholecalciferol 4200-500 MG-UNIT CAPS, Take by mouth, Disp: , Rfl:     calcium carbonate 600 MG TABS tablet, Take 1 tablet by mouth daily, Disp: , Rfl:     Cyanocobalamin (VITAMIN B-12) 1000 MCG SUBL, Place under the tongue, Disp: , Rfl:   Allergies   Allergen Reactions    Azithromycin      Stomach pains    Doxycycline      Stomach pains    Zanaflex [Tizanidine]      Stomach pains     Social History     Socioeconomic History    Marital status:      Spouse name: Not on file    Number of children: Not on file    Years of education: Not on file    Highest education level: Not on file   Occupational History    Not on file   Social Needs    Financial resource strain: Not on file   Rachele Dupree Food insecurity     Worry: Not on file     Inability: Not on file    Transportation needs     Medical: Not on file     Non-medical: Not on file   Tobacco Use    Smoking status: Never Smoker    Smokeless tobacco: Never Used   Substance and Sexual Activity    Alcohol use: No    Drug use: No    Sexual activity: Not on file   Lifestyle    Physical activity     Days per week: Not on file     Minutes per session: Not on file    Stress: Not on file   Relationships    Social connections     Talks on phone: Not on file     Gets together: Not on file     Attends Yazdanism service: Not on file     Active member of club or organization: Not on file     Attends meetings of clubs or organizations: Not on file     Relationship status: Not on file    Intimate partner violence     Fear of current or ex partner: Not on file     Emotionally abused: Not on file     Physically abused: Not on file     Forced sexual activity: Not on file   Other Topics Concern    Not on file   Social History Narrative        HYPOTHYROIDISM    DEPRESSION [address]g [address]  [address] [facility] but later found not to be cancer    Milo Castro  1950 Page #2    RIGHT BREAST CA WITH 13 POSITIVE LYMPH NODES  WITH BILATERAL MASTECTOMY AND    IMPLANTS. ---SEES DR ALBERT    Hysterectomy, left 1 ovary    right inguinal hernia repair    cataracts with IOL bilateral    FAMILY HISTORY OF COLON CA, DIABETES    DIET-CONTROLLED DIABETES    ELEVATED LIVER FUNCTION    HYPERLIPIDEMIA, ON STATINS IN THE PAST    LAST A1C 6.0    LAST CHOLESTEROL 231    CERV DISC OR DR ALBERT ---    ALLERGIES IN THE PAST. SAW DR. 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    COLONOSCOPY 2015---DR RIVER---DIFFICULT TO PASS--PT REFUSES ANY MORE    MRI 11-16 WITH CERVICAL MOD SPINAL STENOSIS---EVAL DR GEMMA--- PT CANCELLED    SHOT    USE TO WORK WITH [employer]    CT HEAD WITH ANGIOMA---4-17 PT STATES WAS THERE YRS AGO    MYOFACIAL PELVIC PAIN SYNDROME 7-17 DR GARCIA----    RIGHT THIRD FINGER OR DR GONZALEZ 10-17    EVAL CARDIO WITH NEG ECHO 10-17 DR JOSE BAPTISTE    PREVAR 2017    [facility]VAG FISTULA BUT MARCIAL DOUBTS- 1-19    NO UTI SINCE USING VAG COCUNUT OIL 1-19    egd  1-20  Dr Nadia Bradley  gasgritis      Past Medical History:   Diagnosis Date    Abdominal pain, other specified site 6/6/2012    Allergic urticaria     [facility]     1999 right breast cancer / treated with surgery and chemo    Depression 2000    Diet-controlled diabetes mellitus ([address]     Hyperlipidemia     DENIES    Hypothyroidism     Myofascial pain syndrome 2017    Pelvic - Dr Mago De Dios    Osteoarthritis cervical spine     cervical spine    Pancreatic cancer ([address] 2007    [facility] Procedure - [facility]    Pancreatitis, acute 6/6/2012    Prolonged emergence from general anesthesia      Family History   Problem Relation Age of Onset    Coronary Art Dis Mother     Diabetes Mother     Colon Cancer Father     Diabetes Other     Colon Cancer Other     Atrial Fibrillation Brother     High Blood Pressure Brother     High Cholesterol Brother     High Cholesterol Brother     High Blood Pressure Brother       Past Surgical History:   Procedure Laterality Date    BREAST RECONSTRUCTION Bilateral 7/25/2019    CAPSULECTOMY OF LEFT BREAST WITH REMOVAL OF SALINE  IMPLANTS AND REPLACMENT WITH BILATERAL SILICONE IMPLANTS performed by Jamar Newby MD at [address]    radical mastectomy r / mastectomy at left    CATARACT REMOVAL Bilateral 2012   [address] 2007    with [facility]  2015    Dr. Thalia Shaikh    to repair muscle     HERNIA REPAIR Right years ago    inguinal    HYSTERECTOMY  1978    OTHER SURGICAL HISTORY Right 10/18/2017    EXCISION GANGLION CYST RIGHT MIDDLE FINGER    PANCREATECTOMY  2007    Whipple 1/3 taken       Vitals:    04/06/21 0851   Temp: 98.4 °F (36.9 °C)   TempSrc: Oral   Weight: 148 lb (67.1 kg)   Height: 5' (1.524 m)       Objective:    Physical Exam  HENT:      Mouth/Throat:      Comments: erh throat    Cardiovascular:      Rate and Rhythm: Normal rate and regular rhythm. Pulses: Normal pulses. Heart sounds: Normal heart sounds. Maida Lugo was seen today for otalgia, pharyngitis and other. Diagnoses and all orders for this visit:    Acute streptococcal pharyngitis  -     POCT rapid strep A    Acute non-recurrent sinusitis of other sinus        Comments: med not grea  Call . A great deal of time spent reviewing medications, diet, exercise, social issues. Also reviewing the chart before entering the room with patient and finishing charting after leaving patient's room. More than half of that time was spent face to face with the patient in counseling and coordinating care. Follow Up: Return if symptoms worsen or fail to improve.      Seen by:  Jean Marie Short, DO

## 2021-07-02 DIAGNOSIS — E03.9 ACQUIRED HYPOTHYROIDISM: ICD-10-CM

## 2021-07-02 DIAGNOSIS — R73.03 PRE-DIABETES: ICD-10-CM

## 2021-07-02 DIAGNOSIS — M81.0 AGE-RELATED OSTEOPOROSIS WITHOUT CURRENT PATHOLOGICAL FRACTURE: ICD-10-CM

## 2021-07-02 DIAGNOSIS — I10 ESSENTIAL HYPERTENSION: ICD-10-CM

## 2021-07-02 DIAGNOSIS — E78.2 MIXED HYPERLIPIDEMIA: ICD-10-CM

## 2021-07-02 DIAGNOSIS — I10 ESSENTIAL HYPERTENSION: Primary | ICD-10-CM

## 2021-07-02 LAB
ALBUMIN SERPL-MCNC: 4.1 G/DL (ref 3.5–5.2)
ALP BLD-CCNC: 94 U/L (ref 35–104)
ALT SERPL-CCNC: 9 U/L (ref 0–32)
AMYLASE: 13 U/L (ref 20–100)
ANION GAP SERPL CALCULATED.3IONS-SCNC: 10 MMOL/L (ref 7–16)
AST SERPL-CCNC: 17 U/L (ref 0–31)
BACTERIA: NORMAL /HPF
BASOPHILS ABSOLUTE: 0.05 E9/L (ref 0–0.2)
BASOPHILS RELATIVE PERCENT: 0.8 % (ref 0–2)
BILIRUB SERPL-MCNC: 0.5 MG/DL (ref 0–1.2)
BILIRUBIN URINE: NEGATIVE
BLOOD, URINE: NEGATIVE
BUN BLDV-MCNC: 14 MG/DL (ref 6–23)
CALCIUM SERPL-MCNC: 9.2 MG/DL (ref 8.6–10.2)
CHLORIDE BLD-SCNC: 101 MMOL/L (ref 98–107)
CHOLESTEROL, TOTAL: 220 MG/DL (ref 0–199)
CLARITY: CLEAR
CO2: 28 MMOL/L (ref 22–29)
COLOR: YELLOW
CREAT SERPL-MCNC: 0.9 MG/DL (ref 0.5–1)
EOSINOPHILS ABSOLUTE: 0.27 E9/L (ref 0.05–0.5)
EOSINOPHILS RELATIVE PERCENT: 4.5 % (ref 0–6)
GFR AFRICAN AMERICAN: >60
GFR NON-AFRICAN AMERICAN: >60 ML/MIN/1.73
GLUCOSE BLD-MCNC: 98 MG/DL (ref 74–99)
GLUCOSE URINE: NEGATIVE MG/DL
HBA1C MFR BLD: 5.6 % (ref 4–5.6)
HCT VFR BLD CALC: 40.8 % (ref 34–48)
HDLC SERPL-MCNC: 55 MG/DL
HEMOGLOBIN: 12.8 G/DL (ref 11.5–15.5)
IMMATURE GRANULOCYTES #: 0.04 E9/L
IMMATURE GRANULOCYTES %: 0.7 % (ref 0–5)
KETONES, URINE: NEGATIVE MG/DL
LDL CHOLESTEROL CALCULATED: 138 MG/DL (ref 0–99)
LEUKOCYTE ESTERASE, URINE: ABNORMAL
LIPASE: 14 U/L (ref 13–60)
LYMPHOCYTES ABSOLUTE: 1.57 E9/L (ref 1.5–4)
LYMPHOCYTES RELATIVE PERCENT: 26.1 % (ref 20–42)
MCH RBC QN AUTO: 27.5 PG (ref 26–35)
MCHC RBC AUTO-ENTMCNC: 31.4 % (ref 32–34.5)
MCV RBC AUTO: 87.6 FL (ref 80–99.9)
MONOCYTES ABSOLUTE: 0.57 E9/L (ref 0.1–0.95)
MONOCYTES RELATIVE PERCENT: 9.5 % (ref 2–12)
NEUTROPHILS ABSOLUTE: 3.52 E9/L (ref 1.8–7.3)
NEUTROPHILS RELATIVE PERCENT: 58.4 % (ref 43–80)
NITRITE, URINE: NEGATIVE
PDW BLD-RTO: 13.6 FL (ref 11.5–15)
PH UA: 7 (ref 5–9)
PLATELET # BLD: 274 E9/L (ref 130–450)
PMV BLD AUTO: 11.1 FL (ref 7–12)
POTASSIUM SERPL-SCNC: 5.6 MMOL/L (ref 3.5–5)
PROTEIN UA: NEGATIVE MG/DL
RBC # BLD: 4.66 E12/L (ref 3.5–5.5)
RBC UA: NORMAL /HPF (ref 0–2)
SODIUM BLD-SCNC: 139 MMOL/L (ref 132–146)
SPECIFIC GRAVITY UA: 1.01 (ref 1–1.03)
T4 TOTAL: 9 MCG/DL (ref 4.5–11.7)
TOTAL PROTEIN: 6.8 G/DL (ref 6.4–8.3)
TRIGL SERPL-MCNC: 133 MG/DL (ref 0–149)
TSH SERPL DL<=0.05 MIU/L-ACNC: 1.13 UIU/ML (ref 0.27–4.2)
UROBILINOGEN, URINE: 0.2 E.U./DL
VITAMIN D 25-HYDROXY: 30 NG/ML (ref 30–100)
VLDLC SERPL CALC-MCNC: 27 MG/DL
WBC # BLD: 6 E9/L (ref 4.5–11.5)
WBC UA: NORMAL /HPF (ref 0–5)

## 2021-07-14 ENCOUNTER — OFFICE VISIT (OUTPATIENT)
Dept: PRIMARY CARE CLINIC | Age: 71
End: 2021-07-14
Payer: MEDICARE

## 2021-07-14 VITALS
HEIGHT: 60 IN | TEMPERATURE: 98.6 F | WEIGHT: 151 LBS | SYSTOLIC BLOOD PRESSURE: 132 MMHG | DIASTOLIC BLOOD PRESSURE: 82 MMHG | BODY MASS INDEX: 29.64 KG/M2

## 2021-07-14 DIAGNOSIS — F41.9 ANXIETY: Chronic | ICD-10-CM

## 2021-07-14 DIAGNOSIS — K29.50 OTHER CHRONIC GASTRITIS WITHOUT HEMORRHAGE: Chronic | ICD-10-CM

## 2021-07-14 DIAGNOSIS — M81.0 AGE-RELATED OSTEOPOROSIS WITHOUT CURRENT PATHOLOGICAL FRACTURE: ICD-10-CM

## 2021-07-14 DIAGNOSIS — R73.03 PRE-DIABETES: ICD-10-CM

## 2021-07-14 DIAGNOSIS — E78.2 MIXED HYPERLIPIDEMIA: Primary | Chronic | ICD-10-CM

## 2021-07-14 DIAGNOSIS — M47.816 SPONDYLOSIS OF LUMBAR REGION WITHOUT MYELOPATHY OR RADICULOPATHY: Chronic | ICD-10-CM

## 2021-07-14 DIAGNOSIS — E03.9 ACQUIRED HYPOTHYROIDISM: Chronic | ICD-10-CM

## 2021-07-14 PROCEDURE — 99214 OFFICE O/P EST MOD 30 MIN: CPT | Performed by: FAMILY MEDICINE

## 2021-07-14 PROCEDURE — G8428 CUR MEDS NOT DOCUMENT: HCPCS | Performed by: FAMILY MEDICINE

## 2021-07-14 PROCEDURE — 1036F TOBACCO NON-USER: CPT | Performed by: FAMILY MEDICINE

## 2021-07-14 PROCEDURE — G8399 PT W/DXA RESULTS DOCUMENT: HCPCS | Performed by: FAMILY MEDICINE

## 2021-07-14 PROCEDURE — G8417 CALC BMI ABV UP PARAM F/U: HCPCS | Performed by: FAMILY MEDICINE

## 2021-07-14 PROCEDURE — 1090F PRES/ABSN URINE INCON ASSESS: CPT | Performed by: FAMILY MEDICINE

## 2021-07-14 PROCEDURE — 1123F ACP DISCUSS/DSCN MKR DOCD: CPT | Performed by: FAMILY MEDICINE

## 2021-07-14 PROCEDURE — 4040F PNEUMOC VAC/ADMIN/RCVD: CPT | Performed by: FAMILY MEDICINE

## 2021-07-14 PROCEDURE — 3017F COLORECTAL CA SCREEN DOC REV: CPT | Performed by: FAMILY MEDICINE

## 2021-07-14 ASSESSMENT — ENCOUNTER SYMPTOMS
EYES NEGATIVE: 1
ALLERGIC/IMMUNOLOGIC NEGATIVE: 1
RESPIRATORY NEGATIVE: 1
GASTROINTESTINAL NEGATIVE: 1

## 2021-07-14 ASSESSMENT — PATIENT HEALTH QUESTIONNAIRE - PHQ9
2. FEELING DOWN, DEPRESSED OR HOPELESS: 0
SUM OF ALL RESPONSES TO PHQ QUESTIONS 1-9: 0
SUM OF ALL RESPONSES TO PHQ9 QUESTIONS 1 & 2: 0
SUM OF ALL RESPONSES TO PHQ QUESTIONS 1-9: 0
SUM OF ALL RESPONSES TO PHQ QUESTIONS 1-9: 0
1. LITTLE INTEREST OR PLEASURE IN DOING THINGS: 0

## 2021-07-14 NOTE — PROGRESS NOTES
21  Name: Lisa Monae    : 1950    Sex: female    Age: 66 y.o. Subjective:  Chief Complaint: Patient is here for  4 mock  Re  b ack pain   thyoird     anx      chol     Feel ok  No  Cp or sob     Back pain   Gets second  Ablation shto with dr Valdez Bound to day  Left shoudler pain  At times  Lab with  Chol  About the same  sw   Dr Nelli Cooley an d  Gave pepcid for a month  And not help an dsees back soon--gave probiotic      Review of Systems   Constitutional: Negative. HENT: Negative. Eyes: Negative. Respiratory: Negative. Cardiovascular: Negative. Gastrointestinal: Negative. Endocrine: Negative. Genitourinary: Negative. Musculoskeletal: Positive for arthralgias (see  hpi). Skin: Negative. Allergic/Immunologic: Negative. Neurological: Negative. Hematological: Negative. Psychiatric/Behavioral: Negative.           Current Outpatient Medications:     FLUoxetine (PROZAC) 10 MG capsule, Take 1 capsule by mouth daily, Disp: 90 capsule, Rfl: 3    metoprolol succinate (TOPROL XL) 25 MG extended release tablet, Take 0.5 tablets by mouth daily, Disp: 90 tablet, Rfl: 3    fluticasone (FLONASE) 50 MCG/ACT nasal spray, 1 spray by Each Nostril route 2 times daily, Disp: 1 Bottle, Rfl: 12    levothyroxine (SYNTHROID) 88 MCG tablet, One four times a week, Disp: 80 tablet, Rfl: 12    levothyroxine (SYNTHROID) 100 MCG tablet, One three times a week, Disp: 50 tablet, Rfl: 12    Multiple Vitamins-Minerals (THERAPEUTIC MULTIVITAMIN-MINERALS) tablet, Take 1 tablet by mouth daily, Disp: , Rfl:     Turmeric 500 MG TABS, Take by mouth, Disp: , Rfl:     Cranberry-Cholecalciferol 4200-500 MG-UNIT CAPS, Take by mouth, Disp: , Rfl:     calcium carbonate 600 MG TABS tablet, Take 1 tablet by mouth daily, Disp: , Rfl:     Cyanocobalamin (VITAMIN B-12) 1000 MCG SUBL, Place under the tongue, Disp: , Rfl:   Allergies   Allergen Reactions    Azithromycin      Stomach pains    Doxycycline      Stomach pains    Zanaflex [Tizanidine]      Stomach pains     Social History     Socioeconomic History    Marital status:      Spouse name: Not on file    Number of children: Not on file    Years of education: Not on file    Highest education level: Not on file   Occupational History    Not on file   Tobacco Use    Smoking status: Never Smoker    Smokeless tobacco: Never Used   Vaping Use    Vaping Use: Never used   Substance and Sexual Activity    Alcohol use: No    Drug use: No    Sexual activity: Not on file   Other Topics Concern    Not on file   Social History Narrative        HYPOTHYROIDISM    [address] 2007 [address] [facility] but later found not to be cancer    Tg Hernandez  1950 Page #2    RIGHT BREAST CA WITH 13 POSITIVE LYMPH NODES  WITH BILATERAL MASTECTOMY AND    IMPLANTS. ---SEES DR ALBERT    Hysterectomy, left 1 ovary    right inguinal hernia repair    cataracts with IOL bilateral    FAMILY HISTORY OF COLON CA, DIABETES    DIET-CONTROLLED DIABETES    ELEVATED LIVER FUNCTION    HYPERLIPIDEMIA, ON STATINS IN THE PAST    LAST A1C 6.0    LAST CHOLESTEROL 231    CERV DISC OR DR ALBERT ---    ALLERGIES IN THE PAST. SAW DR. 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 Bradley, THE    DENTIST    COLONOSCOPY ---DR RIVER---DIFFICULT TO PASS--PT REFUSES ANY MORE    MRI - WITH CERVICAL MOD SPINAL STENOSIS---JOVANNA Smith--- PT CANCELLED    SHOT    Jovanna Luo for gi     gave probitoic and pepcid    USE TO WORK WITH [employer]    CT HEAD WITH ANGIOMA----17 PT STATES WAS THERE YRS AGO    MYOFACIAL PELVIC PAIN SYNDROME - DR GARCIA---- RIGHT THIRD FINGER OR DR GONZALEZ 10-17    EVAL CARDIO WITH NEG ECHO 10-17 DR JOSE BAPTISTE    PREVAR 2017    [facility] FISTULA BUT MARCIAL DOUBTS- 1-19    NO UTI SINCE USING VAG COCUNUT OIL 1-19    egd  1-20  Dr Cary Thompson  gasgritis    Low  back pain  owih  shots dr Bre Kenney   6-21   ablation x two     Social Determinants of Health     Financial Resource Strain:     Difficulty of Paying Living Expenses:    Food Insecurity:     Worried About Running Out of Food in the Last Year:     Ran Out of Food in the Last Year:    Transportation Needs:     Lack of Transportation (Medical):      Lack of Transportation (Non-Medical):    Physical Activity:     Days of Exercise per Week:     Minutes of Exercise per Session:    Stress:     Feeling of Stress :    Social Connections:     Frequency of Communication with Friends and Family:     Frequency of Social Gatherings with Friends and Family:     Attends Confucianist Services:     Active Member of Clubs or Organizations:     Attends Club or Organization Meetings:     Marital Status:    Intimate Partner Violence:     Fear of Current or Ex-Partner:     Emotionally Abused:     Physically Abused:     Sexually Abused:       Past Medical History:   Diagnosis Date    Abdominal pain, other specified site 6/6/2012    Allergic urticaria     [facility] [address]     1999 right breast cancer / treated with surgery and chemo    Depression 2000    Diet-controlled diabetes mellitus ([address]     Hyperlipidemia     DENIES    Hypothyroidism     Myofascial pain syndrome 2017    Pelvic - Dr Cosme Aguilera    Osteoarthritis cervical spine     cervical spine    Pancreatic cancer [facility]) 2007    [facility] - [facility]    Pancreatitis, acute 6/6/2012    Prolonged emergence from general anesthesia      Family History   Problem Relation Age of Onset    Coronary Art Dis Mother     Diabetes Mother     Colon Cancer Father     Diabetes Other     Colon Cancer Other     Atrial Fibrillation Brother     High Blood Pressure Brother     High Cholesterol Brother     High Cholesterol Brother     High Blood Pressure Brother       Past Surgical History:   Procedure Laterality Date    BREAST RECONSTRUCTION Bilateral 7/25/2019    CAPSULECTOMY OF LEFT BREAST WITH REMOVAL OF SALINE  IMPLANTS AND REPLACMENT WITH BILATERAL SILICONE IMPLANTS performed by Hi Murillo MD at [address] [facility]    radical mastectomy r / mastectomy at left    [facility] [address]    CHOLECYSTECTOMY  2007    with Eriberto Weinstein  2015    Dr. Mary Morejon    to repair muscle     HERNIA REPAIR Right years ago    inguinal    HYSTERECTOMY  1978    OTHER SURGICAL HISTORY Right 10/18/2017    EXCISION GANGLION CYST RIGHT MIDDLE FINGER    PANCREATECTOMY  2007    Whipple 1/3 taken       Vitals:    07/14/21 0750   BP: 132/82   Temp: 98.6 °F (37 °C)   TempSrc: Oral   Weight: 151 lb (68.5 kg)   Height: 5' (1.524 m)       Objective:    Physical Exam  Vitals reviewed. Constitutional:       Appearance: She is well-developed. HENT:      Head: Normocephalic. Eyes:      Pupils: Pupils are equal, round, and reactive to light. Cardiovascular:      Rate and Rhythm: Normal rate and regular rhythm. Pulmonary:      Effort: Pulmonary effort is normal.      Breath sounds: Normal breath sounds. Abdominal:      Palpations: Abdomen is soft. Musculoskeletal:      Cervical back: Normal range of motion. Comments: Left shoudler with  Dec rom   Skin:     General: Skin is warm. Neurological:      Mental Status: She is alert and oriented to person, place, and time. Psychiatric:         Behavior: Behavior normal.         Contreras Catalan was seen today for discuss labs.     Diagnoses and all orders for this visit:    Mixed hyperlipidemia    Acquired hypothyroidism    Anxiety    Spondylosis of lumbar region without myelopathy or radiculopathy    Other chronic gastritis without hemorrhage        Comments: she never saw  ent     nish consider   Die e xer  Hm isdiane mey oro for  l  Sh and ant to wipalomo   fuwith   Back  I fnot  Great advised  ccf    Aggressive low-fat diet. Avoid red meats, greasy fried foods, dairy products. Avoid processed foods. Take cholesterol medications without food. A great deal of time spent reviewing medications, diet, exercise, social issues. Also reviewing the chart before entering the room with patient and finishing charting after leaving patient's room. More than half of that time was spent face to face with the patient in counseling and coordinating care. Follow Up: Return in about 6 months (around 1/14/2022) for Lab Before.      Seen by:  Yesica Cook, DO

## 2021-07-16 LAB — PANCREATIC ELASTASE, FECAL: 120 UG/G

## 2021-07-29 ENCOUNTER — TELEPHONE (OUTPATIENT)
Dept: SURGERY | Age: 71
End: 2021-07-29

## 2021-09-13 ENCOUNTER — OFFICE VISIT (OUTPATIENT)
Dept: PODIATRY | Age: 71
End: 2021-09-13
Payer: MEDICARE

## 2021-09-13 VITALS — BODY MASS INDEX: 29.64 KG/M2 | WEIGHT: 151 LBS | HEIGHT: 60 IN

## 2021-09-13 DIAGNOSIS — R73.03 PRE-DIABETES: Chronic | ICD-10-CM

## 2021-09-13 DIAGNOSIS — M20.41 HAMMER TOES OF BOTH FEET: ICD-10-CM

## 2021-09-13 DIAGNOSIS — M20.42 HAMMER TOES OF BOTH FEET: ICD-10-CM

## 2021-09-13 DIAGNOSIS — L84 CORNS AND CALLUS: Primary | ICD-10-CM

## 2021-09-13 PROCEDURE — 1123F ACP DISCUSS/DSCN MKR DOCD: CPT | Performed by: PODIATRIST

## 2021-09-13 PROCEDURE — 4040F PNEUMOC VAC/ADMIN/RCVD: CPT | Performed by: PODIATRIST

## 2021-09-13 PROCEDURE — 99203 OFFICE O/P NEW LOW 30 MIN: CPT | Performed by: PODIATRIST

## 2021-09-13 PROCEDURE — G8427 DOCREV CUR MEDS BY ELIG CLIN: HCPCS | Performed by: PODIATRIST

## 2021-09-13 PROCEDURE — 1090F PRES/ABSN URINE INCON ASSESS: CPT | Performed by: PODIATRIST

## 2021-09-13 PROCEDURE — 1036F TOBACCO NON-USER: CPT | Performed by: PODIATRIST

## 2021-09-13 PROCEDURE — G8417 CALC BMI ABV UP PARAM F/U: HCPCS | Performed by: PODIATRIST

## 2021-09-13 PROCEDURE — 3017F COLORECTAL CA SCREEN DOC REV: CPT | Performed by: PODIATRIST

## 2021-09-13 PROCEDURE — G8399 PT W/DXA RESULTS DOCUMENT: HCPCS | Performed by: PODIATRIST

## 2021-09-13 NOTE — PROGRESS NOTES
Patient is here for callus/corn on 4th and 5th toes.  Zo Wilson DO 7/14/2021  Electronically signed by Charlene Saldivar LPN on 6/40/1780 at 0:19 PM

## 2021-09-13 NOTE — PROGRESS NOTES
21     Abhi Gaitan    : 1950   Sex: female    Age: 66 y.o. Patient's PCP/Provider is:  Derick Robbins DO    Subjective:  Patient is seen today for evaluation regarding continued evaluation regarding corn/callus to both fifth digital regions. Overall patient is doing okay at this time but is having discomfort with certain shoe gear. Patient has tried soaking in different type of topical medications which have helped mildly to reduce symptoms. No other additional abnormalities noted at this time. Chief Complaint   Patient presents with    Callouses     callus/corn 4th and 5th toe       ROS:  Const: Positives and pertinent negatives as per HPI. Musculo: Denies symptoms other than stated above. Neuro: Denies symptoms other than stated above. Skin: Denies symptoms other than stated above. Current Medications:    Current Outpatient Medications:     FLUoxetine (PROZAC) 10 MG capsule, Take 1 capsule by mouth daily, Disp: 90 capsule, Rfl: 3    metoprolol succinate (TOPROL XL) 25 MG extended release tablet, Take 0.5 tablets by mouth daily, Disp: 90 tablet, Rfl: 3    fluticasone (FLONASE) 50 MCG/ACT nasal spray, 1 spray by Each Nostril route 2 times daily, Disp: 1 Bottle, Rfl: 12    levothyroxine (SYNTHROID) 88 MCG tablet, One four times a week, Disp: 80 tablet, Rfl: 12    levothyroxine (SYNTHROID) 100 MCG tablet, One three times a week, Disp: 50 tablet, Rfl: 12    Multiple Vitamins-Minerals (THERAPEUTIC MULTIVITAMIN-MINERALS) tablet, Take 1 tablet by mouth daily, Disp: , Rfl:     Turmeric 500 MG TABS, Take by mouth, Disp: , Rfl:     Cranberry-Cholecalciferol 4200-500 MG-UNIT CAPS, Take by mouth, Disp: , Rfl:     calcium carbonate 600 MG TABS tablet, Take 1 tablet by mouth daily, Disp: , Rfl:     Cyanocobalamin (VITAMIN B-12) 1000 MCG SUBL, Place under the tongue, Disp: , Rfl:     Allergies:   Allergies   Allergen Reactions    Azithromycin      Stomach pains    Doxycycline Stomach pains    Zanaflex [Tizanidine]      Stomach pains       Vitals:    09/13/21 1327   Weight: 151 lb (68.5 kg)   Height: 5' (1.524 m)       Exam:  Neurovascular status unchanged. Corn/callus regions stable bilateral fifth digits. No underlying ulcerations or any signs of infection noted to both feet. No maceration webspaces noted bilateral foot. No edema or ecchymotic skin changes present to both lower extremities. Moderate contraction deformities noted lesser digits bilateral foot. Diagnostic Studies:     No results found. Procedures:    None    Plan Per Assessment  Ann Boogie was seen today for [facility]. Diagnoses and all orders for this visit:    Corns and callus    Hammer toes of both feet    Pre-diabetes      1. Evaluation and management  2. We did discuss appropriate skin care techniques to reduce current symptoms. Was given samples of the Silipos toe sleeves to be worn as directed. 3. Shoe gear recommendations were discussed with patient in detail today as well. 4. Patient will be followed up at a later date for continued evaluation and management. Did discuss additional diabetic foot care techniques with patient today as well. Seen By:    Osmin Rice DPM    Electronically signed by Osmin Rice DPM on 9/13/2021 at 6:40 PM    This note was created using voice recognition software. The note was reviewed however may contain grammatical errors.

## 2021-10-14 ENCOUNTER — TELEPHONE (OUTPATIENT)
Dept: CARDIOLOGY CLINIC | Age: 71
End: 2021-10-14

## 2021-10-14 DIAGNOSIS — I10 ESSENTIAL HYPERTENSION: Chronic | ICD-10-CM

## 2021-10-14 RX ORDER — METOPROLOL SUCCINATE 25 MG/1
25 TABLET, EXTENDED RELEASE ORAL DAILY
Qty: 90 TABLET | Refills: 3 | Status: SHIPPED
Start: 2021-10-14 | End: 2021-11-22 | Stop reason: SDUPTHER

## 2021-10-14 NOTE — TELEPHONE ENCOUNTER
Patient called stating she has been having episodes of dizziness and irregular heartbeat. She states she currently takes metoprolol 12.5 mg daily. She wonders if she should increase the dose. Please advise.

## 2021-10-14 NOTE — TELEPHONE ENCOUNTER
Contacted patient with recommendations per Dr. Joanna Garza.  She verbalized understanding. Medication change made in Epic.

## 2021-10-19 ENCOUNTER — OFFICE VISIT (OUTPATIENT)
Dept: PRIMARY CARE CLINIC | Age: 71
End: 2021-10-19
Payer: MEDICARE

## 2021-10-19 ENCOUNTER — TELEPHONE (OUTPATIENT)
Dept: ADMINISTRATIVE | Age: 71
End: 2021-10-19

## 2021-10-19 ENCOUNTER — NURSE TRIAGE (OUTPATIENT)
Dept: OTHER | Facility: CLINIC | Age: 71
End: 2021-10-19

## 2021-10-19 VITALS
DIASTOLIC BLOOD PRESSURE: 74 MMHG | TEMPERATURE: 98.3 F | BODY MASS INDEX: 29.45 KG/M2 | SYSTOLIC BLOOD PRESSURE: 126 MMHG | WEIGHT: 150 LBS | HEIGHT: 60 IN

## 2021-10-19 DIAGNOSIS — I49.3 FREQUENT PVCS: ICD-10-CM

## 2021-10-19 DIAGNOSIS — I10 ESSENTIAL HYPERTENSION: Chronic | ICD-10-CM

## 2021-10-19 DIAGNOSIS — R00.2 HEART PALPITATIONS: Primary | ICD-10-CM

## 2021-10-19 PROCEDURE — G8428 CUR MEDS NOT DOCUMENT: HCPCS | Performed by: FAMILY MEDICINE

## 2021-10-19 PROCEDURE — 99213 OFFICE O/P EST LOW 20 MIN: CPT | Performed by: FAMILY MEDICINE

## 2021-10-19 PROCEDURE — G8399 PT W/DXA RESULTS DOCUMENT: HCPCS | Performed by: FAMILY MEDICINE

## 2021-10-19 PROCEDURE — G8482 FLU IMMUNIZE ORDER/ADMIN: HCPCS | Performed by: FAMILY MEDICINE

## 2021-10-19 PROCEDURE — 1090F PRES/ABSN URINE INCON ASSESS: CPT | Performed by: FAMILY MEDICINE

## 2021-10-19 PROCEDURE — 1123F ACP DISCUSS/DSCN MKR DOCD: CPT | Performed by: FAMILY MEDICINE

## 2021-10-19 PROCEDURE — 93000 ELECTROCARDIOGRAM COMPLETE: CPT | Performed by: FAMILY MEDICINE

## 2021-10-19 PROCEDURE — 4040F PNEUMOC VAC/ADMIN/RCVD: CPT | Performed by: FAMILY MEDICINE

## 2021-10-19 PROCEDURE — 1036F TOBACCO NON-USER: CPT | Performed by: FAMILY MEDICINE

## 2021-10-19 PROCEDURE — G8417 CALC BMI ABV UP PARAM F/U: HCPCS | Performed by: FAMILY MEDICINE

## 2021-10-19 PROCEDURE — 3017F COLORECTAL CA SCREEN DOC REV: CPT | Performed by: FAMILY MEDICINE

## 2021-10-19 ASSESSMENT — PATIENT HEALTH QUESTIONNAIRE - PHQ9
SUM OF ALL RESPONSES TO PHQ QUESTIONS 1-9: 0
2. FEELING DOWN, DEPRESSED OR HOPELESS: 0
SUM OF ALL RESPONSES TO PHQ QUESTIONS 1-9: 0
1. LITTLE INTEREST OR PLEASURE IN DOING THINGS: 0
SUM OF ALL RESPONSES TO PHQ QUESTIONS 1-9: 0
SUM OF ALL RESPONSES TO PHQ9 QUESTIONS 1 & 2: 0

## 2021-10-19 NOTE — TELEPHONE ENCOUNTER
There is an URGENT referral in the [facility] on Dr. Luis A Ann pt per Dr Belia Sheppard dx: palpitations/PVCs; ----------------asap re increasing amount PVC's and palpiations--she is regular patient there. Last saw Clint Spear in [facility] [facility] on: 11/30/20. Scheduling advise please.

## 2021-10-19 NOTE — TELEPHONE ENCOUNTER
Reason for Disposition   Patient wants to be seen    Answer Assessment - Initial Assessment Questions  1. DESCRIPTION: \"Describe your dizziness. \"    I've had it off and on since Saturday night. I felt like my heart was pounding and room spinning. It's in the back of my head, really my hold head, worse when laying on my back. My ears feels plugged up    2. LIGHTHEADED: \"Do you feel lightheaded? \" (e.g., somewhat faint, woozy, weak upon standing)      Woozy  And feel off balance    3. VERTIGO: \"Do you feel like either you or the room is spinning or tilting? \" (i.e. vertigo)      Yes    4. SEVERITY: \"How bad is it? \"  \"Do you feel like you are going to faint? \" \"Can you stand and walk? \"    - MILD - walking normally    - MODERATE - interferes with normal activities (e.g., work, school)     - SEVERE - unable to stand, requires support to walk, feels like passing out now. Moderate    5. ONSET:  \"When did the dizziness begin? \"      Off and on since since Saturday    6. AGGRAVATING FACTORS: \"Does anything make it worse? \" (e.g., standing, change in head position)      No certain things    7. HEART RATE: \"Can you tell me your heart rate? \" \"How many beats in 15 seconds? \"  (Note: not all patients can do this)        80    8. CAUSE: \"What do you think is causing the dizziness? \"      I don't know. 9. RECURRENT SYMPTOM: \"Have you had dizziness before? \" If so, ask: \"When was the last time? \" \"What happened that time? \"      Yes- with panic attacks and palpitations (doctor increased Metoprolol dose on 10-,  But that hasn't changed anything)    10. OTHER SYMPTOMS: \"Do you have any other symptoms? \" (e.g., fever, chest pain, vomiting, diarrhea, bleeding)        Nausea with dizziness at times, Palpitations,  Feel wiped out after episode. Arms feel heavy and I don't feel right. 11. PREGNANCY: \"Is there any chance you are pregnant? \" \"When was your last menstrual period? \"        no    Protocols used: DIZZINESS-ADULT-[place]    Received call from Jerry hanna at [facility] with [employer] Brief description of triage: dizziness, palpitations, nausea with dizziness    Triage indicates for patient to be seen today. Walk in clinic if no appts        Care advice provided, patient verbalizes understanding; denies any other questions or concerns; instructed to call back for any new or worsening symptoms. Writer provided warm transfer to [place] at [facility] for appointment scheduling. Attention Provider: Thank you for allowing me to participate in the care of your patient. The patient was connected to triage in response to information provided to the ECC/PSC. Please do not respond through this encounter as the response is not directed to a shared pool.

## 2021-10-19 NOTE — PROGRESS NOTES
10/19/21  Name: Gail Baker    : 1950    Sex: female    Age: 66 y.o. Subjective:  Chief Complaint: Patient is here for  palpitaitons     For about a month   She called  cardio and inc metoprolol   25 mg  She feels heart beatin ghard  And occasd beats faster and makes her dizzy      Review of Systems   Constitutional: Negative. HENT: Negative. Eyes: Negative. Respiratory: Negative. Cardiovascular: Positive for palpitations. Gastrointestinal: Negative. Endocrine: Negative. Genitourinary: Negative. Musculoskeletal: Negative. Skin: Negative. Allergic/Immunologic: Negative. Neurological: Negative. Hematological: Negative. Psychiatric/Behavioral: Negative.           Current Outpatient Medications:     metoprolol succinate (TOPROL XL) 25 MG extended release tablet, Take 1 tablet by mouth daily, Disp: 90 tablet, Rfl: 3    FLUoxetine (PROZAC) 10 MG capsule, Take 1 capsule by mouth daily, Disp: 90 capsule, Rfl: 3    fluticasone (FLONASE) 50 MCG/ACT nasal spray, 1 spray by Each Nostril route 2 times daily, Disp: 1 Bottle, Rfl: 12    levothyroxine (SYNTHROID) 88 MCG tablet, One four times a week, Disp: 80 tablet, Rfl: 12    levothyroxine (SYNTHROID) 100 MCG tablet, One three times a week, Disp: 50 tablet, Rfl: 12    Multiple Vitamins-Minerals (THERAPEUTIC MULTIVITAMIN-MINERALS) tablet, Take 1 tablet by mouth daily, Disp: , Rfl:     Turmeric 500 MG TABS, Take by mouth, Disp: , Rfl:     Cranberry-Cholecalciferol 4200-500 MG-UNIT CAPS, Take by mouth, Disp: , Rfl:     calcium carbonate 600 MG TABS tablet, Take 1 tablet by mouth daily, Disp: , Rfl:     Cyanocobalamin (VITAMIN B-12) 1000 MCG SUBL, Place under the tongue, Disp: , Rfl:   Allergies   Allergen Reactions    Azithromycin      Stomach pains    Doxycycline      Stomach pains    Zanaflex [Tizanidine]      Stomach pains     Social History     Socioeconomic History    Marital status:      Spouse name: Not on file    Number of children: Not on file    Years of education: Not on file    Highest education level: Not on file   Occupational History    Not on file   Tobacco Use    Smoking status: Never Smoker    Smokeless tobacco: Never Used   Vaping Use    Vaping Use: Never used   Substance and Sexual Activity    Alcohol use: No    Drug use: No    Sexual activity: Not on file   Other Topics Concern    Not on file   Social History Narrative        [address]OTHYROIDISM    [address] but later found not to be cancer    Juan Diego Griffin  1950 Page #2    RIGHT BREAST CA WITH 13 POSITIVE LYMPH NODES  WITH BILATERAL MASTECTOMY AND    IMPLANTS. ---SEES DR ALBERT    Hysterectomy, left 1 ovary    right inguinal hernia repair    cataracts with IOL bilateral    FAMILY HISTORY OF COLON CA, DIABETES    DIET-CONTROLLED DIABETES    ELEVATED LIVER FUNCTION    HYPERLIPIDEMIA, ON STATINS IN THE PAST    LAST A1C 6.0    LAST CHOLESTEROL 231    CERV DISC OR DR ALBERT ---    ALLERGIES IN THE PAST. SAW DR. 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 Rolly Mcpherson, THE    DENTIST    COLONOSCOPY ---DR RIVER---DIFFICULT TO PASS--PT REFUSES ANY MORE    MRI -16 WITH CERVICAL MOD SPINAL STENOSIS---EVAL DR Savana Constantino--- PT CANCELLED    SHOT    Eval dr Sanjiv Hull for gi  -21   gave probitoic and pepcid    USE TO WORK WITH [employer] DAV[employer]    CT HEAD WITH ANGIOMA--- PT STATES WAS THERE YRS AGO    MYOFACIAL PELVIC PAIN SYNDROME  DR GARCIA----    RIGHT THIRD FINGER OR DR GONZALEZ 10-17    EVAL CARDIO WITH NEG ECHO 10-17 DR JOSE BAPTISTE    PREVAR     EVAL DR CEJA----- [facility] FISTULA BUT STYNE DOUBTS- 1-19    NO UTI SINCE USING VAG COCUNUT OIL 1-19    egd  1-20  Dr Ida Cornell  gasgritis    Low  back pain  owih  shots dr Shabana Romero   6-21   ablation x two     Social Determinants of Health     Financial Resource Strain:     Difficulty of Paying Living Expenses:    Food Insecurity:     Worried About Running Out of Food in the Last Year:     Ran Out of Food in the Last Year:    Transportation Needs:     Lack of Transportation (Medical):      Lack of Transportation (Non-Medical):    Physical Activity:     Days of Exercise per Week:     Minutes of Exercise per Session:    Stress:     Feeling of Stress :    Social Connections:     Frequency of Communication with Friends and Family:     Frequency of Social Gatherings with Friends and Family:     Attends Voodoo Services:     Active Member of Clubs or Organizations:     Attends Club or Organization Meetings:     Marital Status:    Intimate Partner Violence:     Fear of Current or Ex-Partner:     Emotionally Abused:     Physically Abused:     Sexually Abused:       Past Medical History:   Diagnosis Date    Abdominal pain, other specified site 6/6/2012    Allergic urticaria     [facility] ([facility]     1999 right breast cancer / treated with surgery and chemo    Depression 2000    Diet-controlled diabetes mellitus ([facility]     Hyperlipidemia     DENIES    Hypothyroidism     Myofascial pain syndrome 2017    Pelvic - Dr Hu Veras    Osteoarthritis cervical spine     cervical spine    Pancreatic cancer [facility]) 2007    [facility] - [facility]    Pancreatitis, acute 6/6/2012    Prolonged emergence from general anesthesia      Family History   Problem Relation Age of Onset    Coronary Art Dis Mother     Diabetes Mother     Colon Cancer Father     Diabetes Other     Colon Cancer Other     Atrial Fibrillation Brother     High Blood Pressure Brother     High Cholesterol Brother     High Cholesterol Brother     High Blood Pressure Brother       Past Surgical History:   Procedure Laterality Date    BREAST RECONSTRUCTION Bilateral 7/25/2019    CAPSULECTOMY OF LEFT BREAST WITH REMOVAL OF SALINE  IMPLANTS AND REPLACMENT WITH BILATERAL SILICONE IMPLANTS performed by Sarah Morrell MD at [facility]    radical mastectomy r / mastectomy at left    CATARACT REMOVAL Bilateral 2012   [facility]    CHOLECYSTECTOMY  2007    with Christian Fridge  2015    Dr. Greco Listen    to repair muscle     HERNIA REPAIR Right years ago    inguinal    HYSTERECTOMY  1978    OTHER SURGICAL HISTORY Right 10/18/2017    EXCISION GANGLION CYST RIGHT MIDDLE FINGER    PANCREATECTOMY  2007    Whipple 1/3 taken       Vitals:    10/19/21 1433   BP: 126/74   Temp: 98.3 °F (36.8 °C)   TempSrc: Oral   Weight: 150 lb (68 kg)   Height: 5' (1.524 m)       Objective:    Physical Exam  Vitals reviewed. Constitutional:       Appearance: She is well-developed. HENT:      Head: Normocephalic. Eyes:      Pupils: Pupils are equal, round, and reactive to light. Cardiovascular:      Rate and Rhythm: Normal rate. Rhythm irregular. Pulmonary:      Effort: Pulmonary effort is normal.      Breath sounds: Normal breath sounds. Abdominal:      Palpations: Abdomen is soft. Musculoskeletal:         General: Normal range of motion. Cervical back: Normal range of motion. Skin:     General: Skin is warm. Neurological:      Mental Status: She is alert and oriented to person, place, and time. Psychiatric:         Behavior: Behavior normal.         Nicky Vigil was seen today for palpitations and dizziness. Diagnoses and all orders for this visit:    Heart palpitations  -     EKG 12 lead;  Future  -     EKG 12 lead  -     [address] [place] [address], Cardiology, [place]    Frequent PVCs  -     [address] [place] [address] Cardiology, [place]    Essential hypertension        Comments: ekg  appt dr Jatin Ludwig sooner   If worse toe r  A great deal of time spent reviewing medications, diet, exercise, social issues. Also reviewing the chart before entering the room with patient and finishing charting after leaving patient's room. More than half of that time was spent face to face with the patient in counseling and coordinating care. Check blood pressure at home twice a day. Low-salt low caffeine diet. Call if systolic blood pressure is above 389 and diastolic blood pressures above 85. Only use a upper arm digital cuff. Follow Up: Return for See Referral, Reg Appt.      Seen by:  Sherree Schaumann, DO

## 2021-10-21 ENCOUNTER — OFFICE VISIT (OUTPATIENT)
Dept: CARDIOLOGY CLINIC | Age: 71
End: 2021-10-21
Payer: MEDICARE

## 2021-10-21 VITALS
SYSTOLIC BLOOD PRESSURE: 124 MMHG | HEART RATE: 70 BPM | HEIGHT: 61 IN | RESPIRATION RATE: 16 BRPM | OXYGEN SATURATION: 96 % | WEIGHT: 150 LBS | DIASTOLIC BLOOD PRESSURE: 84 MMHG | BODY MASS INDEX: 28.32 KG/M2

## 2021-10-21 DIAGNOSIS — R00.2 PALPITATIONS: Primary | ICD-10-CM

## 2021-10-21 PROCEDURE — 4040F PNEUMOC VAC/ADMIN/RCVD: CPT | Performed by: INTERNAL MEDICINE

## 2021-10-21 PROCEDURE — G8399 PT W/DXA RESULTS DOCUMENT: HCPCS | Performed by: INTERNAL MEDICINE

## 2021-10-21 PROCEDURE — 3017F COLORECTAL CA SCREEN DOC REV: CPT | Performed by: INTERNAL MEDICINE

## 2021-10-21 PROCEDURE — 1090F PRES/ABSN URINE INCON ASSESS: CPT | Performed by: INTERNAL MEDICINE

## 2021-10-21 PROCEDURE — G8417 CALC BMI ABV UP PARAM F/U: HCPCS | Performed by: INTERNAL MEDICINE

## 2021-10-21 PROCEDURE — G8427 DOCREV CUR MEDS BY ELIG CLIN: HCPCS | Performed by: INTERNAL MEDICINE

## 2021-10-21 PROCEDURE — 1036F TOBACCO NON-USER: CPT | Performed by: INTERNAL MEDICINE

## 2021-10-21 PROCEDURE — G8482 FLU IMMUNIZE ORDER/ADMIN: HCPCS | Performed by: INTERNAL MEDICINE

## 2021-10-21 PROCEDURE — 1123F ACP DISCUSS/DSCN MKR DOCD: CPT | Performed by: INTERNAL MEDICINE

## 2021-10-21 PROCEDURE — 99214 OFFICE O/P EST MOD 30 MIN: CPT | Performed by: INTERNAL MEDICINE

## 2021-10-21 RX ORDER — MAGNESIUM OXIDE 400 MG/1
400 TABLET ORAL DAILY
Qty: 30 TABLET | Refills: 1 | Status: SHIPPED
Start: 2021-10-21 | End: 2022-04-19

## 2021-10-21 NOTE — PROGRESS NOTES
Patient was seen in office today for the placement of a 24 hour holter per . Patient tolerated well & understood instructions. Device # [id number]  Ryan Harris MA.

## 2021-10-21 NOTE — PROGRESS NOTES
CHIEF COMPLAINT: Palpitations    HISTORY OF PRESENT ILLNESS: Patient is a 66 y.o. female seen at the request of Sophia Hill, DO. Patient presents in follow up. Seen for increased tachy-palpitations. No CP or SOB. No other issues.     Past Medical History:   Diagnosis Date    Abdominal pain, other specified site 6/6/2012    Allergic urticaria     Cancer ([address]     1999 right breast cancer / treated with surgery and chemo    Depression 2000    Diet-controlled diabetes mellitus ([address]     Hyperlipidemia     DENIES    Hypothyroidism     Myofascial pain syndrome 2017    Pelvic - Dr Hannah Pastor    Osteoarthritis cervical spine     cervical spine    Pancreat[facility] [address] [facility] 2007    [facility]    Pancreatitis, acute 6/6/2012    Prolonged emergence from general anesthesia        Patient Active Problem List   Diagnosis    Acquired hypothyroidism    Anxiety    Cervical spinal stenosis    Ganglion of flexor tendon sheath of right middle finger    History of breast cancer    Essential hypertension    Chronic gastritis without bleeding    Primary osteoarthritis of left knee    Gastroesophageal reflux disease without esophagitis    Spondylosis of lumbar region without myelopathy or radiculopathy    Pre-diabetes    Palpitations    Dermatitis    Chronic otitis externa of right ear    Mixed hyperlipidemia    Lumbar herniated disc    Primary osteoarthritis involving multiple joints       Allergies   Allergen Reactions    Azithromycin      Stomach pains    Doxycycline      Stomach pains    Zanaflex [Tizanidine]      Stomach pains       Current Outpatient Medications   Medication Sig Dispense Refill    magnesium oxide (MAG-OX) 400 MG tablet Take 1 tablet by mouth daily 30 tablet 1    metoprolol succinate (TOPROL XL) 25 MG extended release tablet Take 1 tablet by mouth daily 90 tablet 3    FLUoxetine (PROZAC) 10 MG capsule Take 1 capsule by mouth daily 90 capsule 3    fluticasone (FLONASE) 50 MCG/ACT nasal spray 1 spray by Each Nostril route 2 times daily 1 Bottle 12    levothyroxine (SYNTHROID) 88 MCG tablet One four times a week 80 tablet 12    levothyroxine (SYNTHROID) 100 MCG tablet One three times a week 50 tablet 12    Multiple Vitamins-Minerals (THERAPEUTIC MULTIVITAMIN-MINERALS) tablet Take 1 tablet by mouth daily      Cranberry-Cholecalciferol 4200-500 MG-UNIT CAPS Take by mouth      calcium carbonate 600 MG TABS tablet Take 1 tablet by mouth daily      Cyanocobalamin (VITAMIN B-12) 1000 MCG SUBL Place under the tongue       No current facility-administered medications for this visit. Social History     Socioeconomic History    Marital status:      Spouse name: Not on file    Number of children: Not on file    Years of education: Not on file    Highest education level: Not on file   Occupational History    Not on file   Tobacco Use    Smoking status: Never Smoker    Smokeless tobacco: Never Used   Vaping Use    Vaping Use: Never used   Substance and Sexual Activity    Alcohol use: No    Drug use: No    Sexual activity: Not on file   Other Topics Concern    Not on file   Social History Narrative        HYPOTHYROIDISM    [address] [facility] [address] [place] [address] but later found not to be cancer    Cherylene Rogue  1950 Page #2    RIGHT BREAST CA WITH 13 POSITIVE LYMPH NODES 1998 WITH BILATERAL MASTECTOMY AND    IMPLANTS. ---SEES DR ALBERT    Hysterectomy, left 1 ovary    right inguinal hernia repair    cataracts with IOL bilateral    FAMILY HISTORY OF COLON CA, DIABETES    DIET-CONTROLLED DIABETES    ELEVATED LIVER FUNCTION    HYPERLIPIDEMIA, ON STATINS IN THE PAST    LAST A1C 6.0    LAST CHOLESTEROL 231    CERV DISC OR DR ALBERT ---1993    ALLERGIES IN THE PAST. SAW DR. 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 Bauer, THE    DENTIST    COLONOSCOPY 2015---DR RIVER---DIFFICULT TO PASS--PT REFUSES ANY MORE    MRI 11-16 WITH CERVICAL MOD SPINAL STENOSIS---EVAL DR Bob Cobos--- PT CANCELLED    SHOT    Eval dr Roly Begum for gi  5-21   gave probitoic and pepcid    USE TO WORK WITH [employer] JOANN    CT HEAD WITH ANGIOMA---4-17 PT STATES WAS THERE YRS AGO    MYOFACIAL PELVIC PAIN SYNDROME 7-17 DR GARCIA----    RIGHT THIRD FINGER OR DR GONZALEZ 10-17    EVAL CARDIO WITH NEG ECHO 10-17 DR JOSE BAPTISTE    PREVAR 2017    EVAL DR CEJA----- [facility] THOUGHT ANAL-VAG FISTULA BUT STYNE DOUBTS- 1-19    NO UTI SINCE USING VAG COCUNUT OIL 1-19    egd  1-20  Dr Bedoya Shelldona  gasgritis    Low  back pain  owih  shots dr Tequila Lyn   6-21   ablation x two     Social Determinants of Health     Financial Resource Strain:     Difficulty of Paying Living Expenses:    Food Insecurity:     Worried About Running Out of Food in the Last Year:     Ran Out of Food in the Last Year:    Transportation Needs:     Lack of Transportation (Medical):      Lack of Transportation (Non-Medical):    Physical Activity:     Days of Exercise per Week:     Minutes of Exercise per Session:    Stress:     Feeling of Stress :    Social Connections:     Frequency of Communication with Friends and Family:     Frequency of Social Gatherings with Friends and Family:     Attends Christian Services:     Active Member of Clubs or Organizations:     Attends Club or Organization Meetings:     Marital Status:    Intimate Partner Violence:     Fear of Current or Ex-Partner:     Emotionally Abused:     Physically Abused:     Sexually Abused:        Family History   Problem Relation Age of Onset    Coronary Art Dis Mother     Diabetes Mother     Colon Cancer Father     Diabetes Other     Colon Cancer Other     Atrial Fibrillation Brother     High Blood Pressure Brother     High Cholesterol Brother     High Cholesterol Brother     High Blood Pressure Brother        Review of Systems:  Heart: as above   Lungs: as above   Eyes: denies changes in vision or discharge. Ears: denies changes in hearing or pain. Nose: denies epistaxis or masses   Throat: denies sore throat or trouble swallowing. Neuro: denies numbness, tingling, tremors. Skin: denies rashes or itching. : denies hematuria, dysuria   GI: denies vomiting, diarrhea   Psych: denies mood changed, anxiety, depression. All other systems negative. Physical Exam   /84   Pulse 70   Resp 16   Ht 5' 1\" (1.549 m)   Wt 150 lb (68 kg)   SpO2 96%   BMI 28.34 kg/m²   Constitutional: Oriented to person, place, and time. Well-developed and well-nourished. No distress. Head: Normocephalic and atraumatic. Eyes: EOM are normal. Pupils are equal, round, and reactive to light. Neck: Normal range of motion. Neck supple. No hepatojugular reflux and no JVD present. Carotid bruit is not present. No tracheal deviation present. No thyromegaly present. Cardiovascular: Normal rate, regular rhythm, normal heart sounds and intact distal pulses. Exam reveals no gallop and no friction rub. No murmur heard. Pulmonary/Chest: Effort normal and breath sounds normal. No respiratory distress. No wheezes. No rales. No tenderness. Abdominal: Soft. Bowel sounds are normal. No distension and no mass. No tenderness. No rebound and no guarding. Musculoskeletal: Normal range of motion. No edema and no tenderness. Lymphadenopathy:   No cervical adenopathy. No groin adenopathy. Neurological: Alert and oriented to person, place, and time. Skin: Skin is warm and dry. No rash noted. Not diaphoretic. No erythema. Psychiatric: Normal mood and affect. Behavior is normal.     EKG:  normal sinus rhythm, nonspecific ST and T waves changes.  PAC's and PVC's. Echo Summary 9/18/17:   Left ventricular internal dimensions, wall thickness, regional wall motion, and systolic function appear to be normal.   Ejection fraction is visually estimated at 60%. There is Doppler evidence for stage I diastolic dysfunction. No obvious abnormality of valve function was seen. ASSESSMENT AND PLAN:  Patient Active Problem List   Diagnosis    Acquired hypothyroidism    Anxiety    Cervical spinal stenosis    Ganglion of flexor tendon sheath of right middle finger    History of breast cancer    Essential hypertension    Chronic gastritis without bleeding    Primary osteoarthritis of left knee    Gastroesophageal reflux disease without esophagitis    Spondylosis of lumbar region without myelopathy or radiculopathy    Pre-diabetes    Palpitations    Dermatitis    Chronic otitis externa of right ear    Mixed hyperlipidemia    Lumbar herniated disc    Primary osteoarthritis involving multiple joints     1. Palpitations:    Echo normal 9/17. Holter. BB/. Marisol Mathis D.O.   Cardiologist  Cardiology, [facility]

## 2021-10-27 DIAGNOSIS — R00.2 PALPITATIONS: ICD-10-CM

## 2021-10-28 ENCOUNTER — TELEPHONE (OUTPATIENT)
Dept: CARDIOLOGY CLINIC | Age: 71
End: 2021-10-28

## 2021-10-28 DIAGNOSIS — R00.2 PALPITATIONS: ICD-10-CM

## 2021-10-28 DIAGNOSIS — R00.2 PALPITATIONS: Primary | ICD-10-CM

## 2021-10-28 LAB
ANION GAP SERPL CALCULATED.3IONS-SCNC: 11 MMOL/L (ref 7–16)
BUN BLDV-MCNC: 12 MG/DL (ref 6–23)
CALCIUM SERPL-MCNC: 9.2 MG/DL (ref 8.6–10.2)
CHLORIDE BLD-SCNC: 101 MMOL/L (ref 98–107)
CO2: 27 MMOL/L (ref 22–29)
CREAT SERPL-MCNC: 0.8 MG/DL (ref 0.5–1)
GFR AFRICAN AMERICAN: >60
GFR NON-AFRICAN AMERICAN: >60 ML/MIN/1.73
GLUCOSE BLD-MCNC: 107 MG/DL (ref 74–99)
POTASSIUM SERPL-SCNC: 4.5 MMOL/L (ref 3.5–5)
SODIUM BLD-SCNC: 139 MMOL/L (ref 132–146)
T4 TOTAL: 10.1 MCG/DL (ref 4.5–11.7)
TSH SERPL DL<=0.05 MIU/L-ACNC: 0.64 UIU/ML (ref 0.27–4.2)

## 2021-10-28 NOTE — TELEPHONE ENCOUNTER
Patient is inquiring about monitor results, she states she is still having palpitations, please advise

## 2021-10-28 NOTE — TELEPHONE ENCOUNTER
Monitor was okay with just rare skipped beats from top and bottom of heart. Ask her to get BMP, TSH and T4 and increase toprol to 25 mg twice a day. Braxton Sousa D.O.   Cardiologist  Cardiology, [facility]

## 2021-11-22 DIAGNOSIS — I10 ESSENTIAL HYPERTENSION: Chronic | ICD-10-CM

## 2021-11-22 RX ORDER — METOPROLOL SUCCINATE 25 MG/1
25 TABLET, EXTENDED RELEASE ORAL 2 TIMES DAILY
Qty: 180 TABLET | Refills: 3 | Status: SHIPPED | OUTPATIENT
Start: 2021-11-22

## 2021-12-01 ENCOUNTER — TELEPHONE (OUTPATIENT)
Dept: CARDIOLOGY CLINIC | Age: 71
End: 2021-12-01

## 2021-12-01 NOTE — TELEPHONE ENCOUNTER
Patient states she is taking toprol 25mg BID as instructed on 10/28 however she is still having palpitations and SOB, she says the increase in dose is also affecting her sleep, please advise

## 2021-12-02 DIAGNOSIS — R00.2 PALPITATIONS: Primary | ICD-10-CM

## 2021-12-02 NOTE — TELEPHONE ENCOUNTER
She can return to her prior metoprolol dose. Please ask her to do a 14 day monitor to further delineate her symptoms. We need to see if she needs an electrophysiology evaluation. Madalyn Turner D.O.   Cardiologist  Cardiology, [facility]

## 2021-12-03 ENCOUNTER — NURSE ONLY (OUTPATIENT)
Dept: CARDIOLOGY CLINIC | Age: 71
End: 2021-12-03
Payer: MEDICARE

## 2021-12-03 NOTE — PROGRESS NOTES
Ron Reese came in today for a 14 day monitor per Dr Glenny Alaniz, pt understood all instructions.   Monitor # [id number]

## 2021-12-20 DIAGNOSIS — R00.2 PALPITATIONS: ICD-10-CM

## 2021-12-23 ENCOUNTER — TELEPHONE (OUTPATIENT)
Dept: CARDIOLOGY CLINIC | Age: 71
End: 2021-12-23

## 2021-12-23 NOTE — TELEPHONE ENCOUNTER
Patient is inquiring about holter report, she states she is still having a lot of palpitations and dizziness, please advise

## 2021-12-27 DIAGNOSIS — R00.2 PALPITATIONS: Primary | ICD-10-CM

## 2022-01-12 DIAGNOSIS — E03.9 ACQUIRED HYPOTHYROIDISM: Chronic | ICD-10-CM

## 2022-01-12 DIAGNOSIS — M81.0 AGE-RELATED OSTEOPOROSIS WITHOUT CURRENT PATHOLOGICAL FRACTURE: ICD-10-CM

## 2022-01-12 DIAGNOSIS — E78.2 MIXED HYPERLIPIDEMIA: Chronic | ICD-10-CM

## 2022-01-12 DIAGNOSIS — R73.03 PRE-DIABETES: ICD-10-CM

## 2022-01-12 LAB
ALBUMIN SERPL-MCNC: 4.1 G/DL (ref 3.5–5.2)
ALP BLD-CCNC: 106 U/L (ref 35–104)
ALT SERPL-CCNC: 14 U/L (ref 0–32)
ANION GAP SERPL CALCULATED.3IONS-SCNC: 18 MMOL/L (ref 7–16)
AST SERPL-CCNC: 21 U/L (ref 0–31)
BACTERIA: NORMAL /HPF
BASOPHILS ABSOLUTE: 0.04 E9/L (ref 0–0.2)
BASOPHILS RELATIVE PERCENT: 0.6 % (ref 0–2)
BILIRUB SERPL-MCNC: 0.5 MG/DL (ref 0–1.2)
BILIRUBIN URINE: NEGATIVE
BLOOD, URINE: NORMAL
BUN BLDV-MCNC: 17 MG/DL (ref 6–23)
CALCIUM SERPL-MCNC: 9.5 MG/DL (ref 8.6–10.2)
CHLORIDE BLD-SCNC: 104 MMOL/L (ref 98–107)
CHOLESTEROL, TOTAL: 225 MG/DL (ref 0–199)
CLARITY: CLEAR
CO2: 23 MMOL/L (ref 22–29)
COLOR: YELLOW
CREAT SERPL-MCNC: 0.9 MG/DL (ref 0.5–1)
EOSINOPHILS ABSOLUTE: 0.29 E9/L (ref 0.05–0.5)
EOSINOPHILS RELATIVE PERCENT: 4.1 % (ref 0–6)
GFR AFRICAN AMERICAN: >60
GFR NON-AFRICAN AMERICAN: >60 ML/MIN/1.73
GLUCOSE BLD-MCNC: 102 MG/DL (ref 74–99)
GLUCOSE URINE: NEGATIVE MG/DL
HBA1C MFR BLD: 5.7 % (ref 4–5.6)
HCT VFR BLD CALC: 41.5 % (ref 34–48)
HDLC SERPL-MCNC: 59 MG/DL
HEMOGLOBIN: 13 G/DL (ref 11.5–15.5)
IMMATURE GRANULOCYTES #: 0.03 E9/L
IMMATURE GRANULOCYTES %: 0.4 % (ref 0–5)
KETONES, URINE: NEGATIVE MG/DL
LDL CHOLESTEROL CALCULATED: 141 MG/DL (ref 0–99)
LEUKOCYTE ESTERASE, URINE: NEGATIVE
LYMPHOCYTES ABSOLUTE: 1.52 E9/L (ref 1.5–4)
LYMPHOCYTES RELATIVE PERCENT: 21.6 % (ref 20–42)
MCH RBC QN AUTO: 27.8 PG (ref 26–35)
MCHC RBC AUTO-ENTMCNC: 31.3 % (ref 32–34.5)
MCV RBC AUTO: 88.9 FL (ref 80–99.9)
MONOCYTES ABSOLUTE: 0.69 E9/L (ref 0.1–0.95)
MONOCYTES RELATIVE PERCENT: 9.8 % (ref 2–12)
NEUTROPHILS ABSOLUTE: 4.47 E9/L (ref 1.8–7.3)
NEUTROPHILS RELATIVE PERCENT: 63.5 % (ref 43–80)
NITRITE, URINE: NEGATIVE
PDW BLD-RTO: 13.5 FL (ref 11.5–15)
PH UA: 6 (ref 5–9)
PLATELET # BLD: 286 E9/L (ref 130–450)
PMV BLD AUTO: 11.2 FL (ref 7–12)
POTASSIUM SERPL-SCNC: 5.7 MMOL/L (ref 3.5–5)
PROTEIN UA: NEGATIVE MG/DL
RBC # BLD: 4.67 E12/L (ref 3.5–5.5)
RBC UA: NORMAL /HPF (ref 0–2)
SODIUM BLD-SCNC: 145 MMOL/L (ref 132–146)
SPECIFIC GRAVITY UA: 1.02 (ref 1–1.03)
T4 TOTAL: 8.6 MCG/DL (ref 4.5–11.7)
TOTAL PROTEIN: 7.2 G/DL (ref 6.4–8.3)
TRIGL SERPL-MCNC: 127 MG/DL (ref 0–149)
TSH SERPL DL<=0.05 MIU/L-ACNC: 0.55 UIU/ML (ref 0.27–4.2)
UROBILINOGEN, URINE: 0.2 E.U./DL
VITAMIN D 25-HYDROXY: 31 NG/ML (ref 30–100)
VLDLC SERPL CALC-MCNC: 25 MG/DL
WBC # BLD: 7 E9/L (ref 4.5–11.5)
WBC UA: NORMAL /HPF (ref 0–5)

## 2022-01-21 ENCOUNTER — OFFICE VISIT (OUTPATIENT)
Dept: PRIMARY CARE CLINIC | Age: 72
End: 2022-01-21
Payer: MEDICARE

## 2022-01-21 VITALS
HEIGHT: 61 IN | DIASTOLIC BLOOD PRESSURE: 82 MMHG | SYSTOLIC BLOOD PRESSURE: 132 MMHG | BODY MASS INDEX: 28.89 KG/M2 | WEIGHT: 153 LBS | TEMPERATURE: 97.1 F

## 2022-01-21 DIAGNOSIS — I10 ESSENTIAL HYPERTENSION: Chronic | ICD-10-CM

## 2022-01-21 DIAGNOSIS — F41.9 ANXIETY: ICD-10-CM

## 2022-01-21 DIAGNOSIS — M81.0 AGE-RELATED OSTEOPOROSIS WITHOUT CURRENT PATHOLOGICAL FRACTURE: ICD-10-CM

## 2022-01-21 DIAGNOSIS — M47.816 SPONDYLOSIS OF LUMBAR REGION WITHOUT MYELOPATHY OR RADICULOPATHY: Chronic | ICD-10-CM

## 2022-01-21 DIAGNOSIS — E03.9 ACQUIRED HYPOTHYROIDISM: ICD-10-CM

## 2022-01-21 DIAGNOSIS — Z00.00 ROUTINE GENERAL MEDICAL EXAMINATION AT A HEALTH CARE FACILITY: Primary | ICD-10-CM

## 2022-01-21 DIAGNOSIS — E78.2 MIXED HYPERLIPIDEMIA: Chronic | ICD-10-CM

## 2022-01-21 DIAGNOSIS — E11.9 DIET-CONTROLLED DIABETES MELLITUS (HCC): ICD-10-CM

## 2022-01-21 DIAGNOSIS — E87.5 HYPERKALEMIA: ICD-10-CM

## 2022-01-21 PROCEDURE — 3044F HG A1C LEVEL LT 7.0%: CPT | Performed by: FAMILY MEDICINE

## 2022-01-21 PROCEDURE — 4040F PNEUMOC VAC/ADMIN/RCVD: CPT | Performed by: FAMILY MEDICINE

## 2022-01-21 PROCEDURE — 1123F ACP DISCUSS/DSCN MKR DOCD: CPT | Performed by: FAMILY MEDICINE

## 2022-01-21 PROCEDURE — G0439 PPPS, SUBSEQ VISIT: HCPCS | Performed by: FAMILY MEDICINE

## 2022-01-21 PROCEDURE — 3017F COLORECTAL CA SCREEN DOC REV: CPT | Performed by: FAMILY MEDICINE

## 2022-01-21 PROCEDURE — G8482 FLU IMMUNIZE ORDER/ADMIN: HCPCS | Performed by: FAMILY MEDICINE

## 2022-01-21 RX ORDER — FLUOXETINE 10 MG/1
10 CAPSULE ORAL DAILY
Qty: 90 CAPSULE | Refills: 3 | Status: SHIPPED | OUTPATIENT
Start: 2022-01-21

## 2022-01-21 RX ORDER — LEVOTHYROXINE SODIUM 0.1 MG/1
TABLET ORAL
Qty: 50 TABLET | Refills: 12 | Status: SHIPPED | OUTPATIENT
Start: 2022-01-21

## 2022-01-21 RX ORDER — LEVOTHYROXINE SODIUM 88 UG/1
TABLET ORAL
Qty: 80 TABLET | Refills: 12 | Status: SHIPPED | OUTPATIENT
Start: 2022-01-21

## 2022-01-21 ASSESSMENT — LIFESTYLE VARIABLES
HOW OFTEN DO YOU HAVE A DRINK CONTAINING ALCOHOL: 0
AUDIT TOTAL SCORE: INCOMPLETE
AUDIT-C TOTAL SCORE: INCOMPLETE
HOW OFTEN DO YOU HAVE A DRINK CONTAINING ALCOHOL: NEVER

## 2022-01-21 ASSESSMENT — PATIENT HEALTH QUESTIONNAIRE - PHQ9
SUM OF ALL RESPONSES TO PHQ QUESTIONS 1-9: 0
SUM OF ALL RESPONSES TO PHQ9 QUESTIONS 1 & 2: 0
1. LITTLE INTEREST OR PLEASURE IN DOING THINGS: 0
2. FEELING DOWN, DEPRESSED OR HOPELESS: 0
SUM OF ALL RESPONSES TO PHQ QUESTIONS 1-9: 0

## 2022-01-21 NOTE — PATIENT INSTRUCTIONS
Personalized Preventive Plan for Gloria Romeo - 1/21/2022  Medicare offers a range of preventive health benefits. Some of the tests and screenings are paid in full while other may be subject to a deductible, co-insurance, and/or copay. Some of these benefits include a comprehensive review of your medical history including lifestyle, illnesses that may run in your family, and various assessments and screenings as appropriate. After reviewing your medical record and screening and assessments performed today your provider may have ordered immunizations, labs, imaging, and/or referrals for you. A list of these orders (if applicable) as well as your Preventive Care list are included within your After Visit Summary for your review. Other Preventive Recommendations:    · A preventive eye exam performed by an eye specialist is recommended every 1-2 years to screen for glaucoma; cataracts, macular degeneration, and other eye disorders. · A preventive dental visit is recommended every 6 months. · Try to get at least 150 minutes of exercise per week or 10,000 steps per day on a pedometer . · Order or download the FREE \"Exercise & Physical Activity: Your Everyday Guide\" from [employer]. Call 4-918.420.7180 or search [employer] online. · You need 6914-5614 mg of calcium and 5966-6526 IU of vitamin D per day. It is possible to meet your calcium requirement with diet alone, but a vitamin D supplement is usually necessary to meet this goal.  · When exposed to the sun, use a sunscreen that protects against both UVA and UVB radiation with an SPF of 30 or greater. Reapply every 2 to 3 hours or after sweating, drying off with a towel, or swimming. · Always wear a seat belt when traveling in a car. Always wear a helmet when riding a bicycle or motorcycle.

## 2022-01-21 NOTE — PROGRESS NOTES
Medicare Annual Wellness Visit  Name: Wenceslao Phillips Date: 2022   MRN: [id number] Sex: Female   Age: 66 y.o. Ethnicity: Non- / Non    : 1950 Race: White (non-)      Cecilio Rodriguez is here for Medicare AWV and Discuss Labs    6-month checkup regarding back pain thyroid arthritis anxiety cholesterol palpitations. Feels well. Cardiology noted some SVT on monitor and referring to electrophysiology cardiology due . Laboratory studies noted. Sugar and cholesterol are elevated. Back pain     Dr Sohan Raphael said  shailesh Walton to do        Left lwo back pain  And  Pain    Form left lwo back into left thigh   She says  Not rewaly maxwell t more tired and achy    posassium higher   Each time  And she says  Been like this for a long time    Screenings for behavioral, psychosocial and functional/safety risks, and cognitive dysfunction are all negative except as indicated below. These results, as well as other patient data from the [address] form, are documented in Flowsheets linked to this Encounter. Allergies   Allergen Reactions    Azithromycin      Stomach pains    Doxycycline      Stomach pains    Zanaflex [Tizanidine]      Stomach pains         Prior to Visit Medications    Medication Sig Taking?  Authorizing Provider   FLUoxetine (PROZAC) 10 MG capsule Take 1 capsule by mouth daily Yes Valeriano Robbins DO   levothyroxine (SYNTHROID) 100 MCG tablet One three times a week Yes Valeriano Robbins DO   levothyroxine (SYNTHROID) 88 MCG tablet One four times a week Yes Valeriano Robbins DO   metoprolol succinate (TOPROL XL) 25 MG extended release tablet Take 1 tablet by mouth 2 times daily  Patient taking differently: Take 25 mg by mouth daily   Damián Acevedo,    magnesium oxide (MAG-OX) 400 MG tablet Take 1 tablet by mouth daily  Damián Acevedo DO   fluticasone (FLONASE) 50 MCG/ACT nasal spray 1 spray by Each Nostril route 2 times daily  Valeriano DOLL Mingilbertoi, DO   Multiple Vitamins-Minerals (THERAPEUTIC MULTIVITAMIN-MINERALS) tablet Take 1 tablet by mouth daily  Historical Provider, MD   Cranberry-Cholecalciferol 4200-500 MG-UNIT CAPS Take by mouth  Historical Provider, MD   calcium carbonate 600 MG TABS tablet Take 1 tablet by mouth daily  Historical Provider, MD   Cyanocobalamin (VITAMIN B-12) 1000 MCG SUBL Place under the tongue  Historical Provider, MD         Past Medical History:   Diagnosis Date    Abdominal pain, other specified site 6/6/2012    Allergic urticaria     Cancer ([facility] [address]     1999 right breast cancer / treated with surgery and chemo    Depression 2000    Diet-controlled diabetes mellitus ([facility] [address]     Hyperlipidemia     DENIES    Hypothyroidism     Myofascial pain syndrome 2017    Pelvic - Dr Yeimi Allen Osteoarthritis cervical spine     cervical spine    Pancreatic cancer [facility]) 2007    Partial Whipple Procedure - [facility]    Pancreatitis, acute 6/6/2012    Prolonged emergence from general anesthesia        Past Surgical History:   Procedure Laterality Date    BREAST RECONSTRUCTION Bilateral 7/25/2019    CAPSULECTOMY OF LEFT BREAST WITH REMOVAL OF SALINE  IMPLANTS AND REPLACMENT WITH BILATERAL SILICONE IMPLANTS performed by Skip Childers MD at [address] [place] [address]    radical mastectomy r / mastectomy at left    CATARACT REMOVAL Bilateral 2012   [address] CHOLECYSTECTOMY  2007    with Alfredo Knight  2015    Dr. Andrey Julian    to repair muscle     HERNIA REPAIR Right years ago    inguinal    HYSTERECTOMY  1978    OTHER SURGICAL HISTORY Right 10/18/2017    EXCISION GANGLION CYST RIGHT MIDDLE FINGER    PANCREATECTOMY  2007    Whipple 1/3 taken          Family History   Problem Relation Age of Onset    Coronary Art Dis Mother     Diabetes Mother     Colon Cancer Father     Diabetes Other     Colon Cancer Other     Atrial Fibrillation Brother     High Blood Pressure Brother     High Cholesterol Brother     High Cholesterol Brother     High Blood Pressure Brother        CareTeam (Including outside providers/suppliers regularly involved in providing care):   Patient Care Team:  Rajendra Allen DO as PCP - General (Family Medicine)  Rajendra Allen DO as PCP - [facility]  Clifford Banegas DO as Consulting Physician (Cardiology)    Wt Readings from Last 3 Encounters:   01/21/22 153 lb (69.4 kg)   10/21/21 150 lb (68 kg)   10/19/21 150 lb (68 kg)     Vitals:    01/21/22 1018   BP: 132/82   Temp: 97.1 °F (36.2 °C)   TempSrc: Infrared   Weight: 153 lb (69.4 kg)   Height: 5' 1\" (1.549 m)     Body mass index is 28.91 kg/m². Based upon direct observation of the patient, evaluation of cognition reveals recent and remote memory intact.     General Appearance: alert and oriented to person, place and time, well developed and well- nourished, in no acute distress  Skin: warm and dry, no rash or erythema  Head: normocephalic and atraumatic  Eyes: pupils equal, round, and reactive to light, extraocular eye movements intact, conjunctivae normal  ENT: tympanic membrane, external ear and ear canal normal bilaterally, nose without deformity, nasal mucosa and turbinates normal without polyps  Neck: supple and non-tender without mass, no thyromegaly or thyroid nodules, no cervical lymphadenopathy  Pulmonary/Chest: clear to auscultation bilaterally- no wheezes, rales or rhonchi, normal air movement, no respiratory distress  Cardiovascular: normal rate, regular rhythm, normal S1 and S2, no murmurs, rubs, clicks, or gallops, distal pulses intact, no carotid bruits  Abdomen: soft, non-tender, non-distended, normal bowel sounds, no masses or organomegaly  Extremities: no cyanosis, clubbing or edema  Musculoskeletal: normal range of motion, no joint swelling, deformity or tenderness  Neurologic: reflexes normal and symmetric, no cranial nerve deficit, gait, coordination and speech normal    Patient's complete Health Risk Assessment and screening values have been reviewed and are found in Flowsheets. The following problems were reviewed today and where indicated follow up appointments were made and/or referrals ordered.       Positive Risk Factor Screenings with Interventions:             Health Habits/Nutrition:  Health Habits/Nutrition  Do you exercise for at least 20 minutes 2-3 times per week?: (!) No  Have you lost any weight without trying in the past 3 months?: No  Do you eat only one meal per day?: No  Have you seen the dentist within the past year?: Yes  Body mass index: (!) 28.91  Health Habits/Nutrition Interventions:  · Inadequate physical activity:  patient agrees to exercise for at least 150 minutes/week         Personalized Preventive Plan   Current Health Maintenance Status  Immunization History   Administered Date(s) Administered    COVID-19, Pfizer Purple top, DILUTE for use, 12+ yrs, 30mcg/0.3mL dose 02/01/2021, 02/22/2021, 10/25/2021    Influenza, High Dose (Fluzone 65 yrs and older) 11/01/2016, 09/21/2017, 09/28/2021    Influenza, MDCK Quadv, IM, PF (Flucelvax 2 yrs and older) 10/03/2019    Influenza, Quadv, IM, PF (6 mo and older Fluzone, Flulaval, Fluarix, and 3 yrs and older Afluria) 10/08/2020    Influenza, Triv, inactivated, subunit, adjuvanted, IM (Fluad 65 yrs and older) 10/05/2018    Pneumococcal Conjugate 13-valent (Eligah Stark) 11/03/2017    Zoster Live (Zostavax) 12/16/2013        Health Maintenance   Topic Date Due    Hepatitis C screen  Never done    DTaP/Tdap/Td vaccine (1 - Tdap) Never done    Shingles Vaccine (2 of 3) 02/10/2014    Breast cancer screen  03/29/2018    Pneumococcal 65+ years Vaccine (2 of 2 - PPSV23) 11/03/2018    Colon Cancer Screen FIT/FOBT  05/31/2019    Annual Wellness Visit (AWV)  01/12/2022    Depression Screen  10/19/2022    A1C test (Diabetic or Prediabetic)  01/12/2023  TSH testing  01/12/2023    Potassium monitoring  01/12/2023    Creatinine monitoring  01/12/2023    Lipid screen  01/12/2027    DEXA (modify frequency per FRAX score)  Completed    Flu vaccine  Completed    COVID-19 Vaccine  Completed    Hepatitis A vaccine  Aged Out    Hepatitis B vaccine  Aged Out    Hib vaccine  Aged Out    Meningococcal (ACWY) vaccine  Aged Out     Recommendations for [employer] Due: see orders and patient instructions/AVS.  . Recommended screening schedule for the next 5-10 years is provided to the patient in written form: see Patient Rocky Shaver was seen today for medicare aw and discuss labs. Diagnoses and all orders for this visit:    Routine general medical examination at a health care facility    Essential hypertension    Spondylosis of lumbar region without myelopathy or radiculopathy    Mixed hyperlipidemia    Acquired hypothyroidism  -     levothyroxine (SYNTHROID) 100 MCG tablet; One three times a week  -     levothyroxine (SYNTHROID) 88 MCG tablet; One four times a week    Diet-controlled diabetes mellitus ([address]    Hyperkalemia  -     External Referral To Nephrology    Anxiety  -     FLUoxetine (PROZAC) 10 MG capsule; Take 1 capsule by mouth daily                 dc  Any vit with potassium     appt renal  Diet exer   Await  EP cardio eval   Offer ccf for back and she  jsut wants to live with it fo rnow      I educated the patient about all medications. Make sure they were correct and educated  on the risk associated with missing meds or taking them incorrectly. A list of medications is being sent home with patient today. I informed patient about the risk associated with noncompliance of medication and taking medications incorrectly. Appropriate follow-up with myself and all specialist.  Encourage family members to take active role in assisting with medications and medical care.   If any confusion should develop to notify my office immediately to avoid risk of worsening medical condition    Check blood pressure at home twice a day. Low-salt low caffeine diet. Call if systolic blood pressure is above 592 and diastolic blood pressures above 85. Only use a upper arm digital cuff. Aggressive low-fat diet. Avoid red meats, greasy fried foods, dairy products. Avoid processed foods. Take cholesterol medications without food. A great deal of time spent reviewing medications, diet, exercise, social issues. Also reviewing the chart before entering the room with patient and finishing charting after leaving patient's room. More than half of that time was spent face to face with the patient in counseling and coordinating care.

## 2022-01-24 ENCOUNTER — TELEPHONE (OUTPATIENT)
Dept: CARDIOLOGY CLINIC | Age: 72
End: 2022-01-24

## 2022-01-25 DIAGNOSIS — R00.2 PALPITATIONS: Primary | ICD-10-CM

## 2022-01-27 DIAGNOSIS — R00.2 PALPITATIONS: ICD-10-CM

## 2022-01-27 LAB
ANION GAP SERPL CALCULATED.3IONS-SCNC: 11 MMOL/L (ref 7–16)
BUN BLDV-MCNC: 14 MG/DL (ref 6–23)
CALCIUM SERPL-MCNC: 9.5 MG/DL (ref 8.6–10.2)
CHLORIDE BLD-SCNC: 103 MMOL/L (ref 98–107)
CO2: 28 MMOL/L (ref 22–29)
CREAT SERPL-MCNC: 0.8 MG/DL (ref 0.5–1)
GFR AFRICAN AMERICAN: >60
GFR NON-AFRICAN AMERICAN: >60 ML/MIN/1.73
GLUCOSE BLD-MCNC: 115 MG/DL (ref 74–99)
POTASSIUM SERPL-SCNC: 4.8 MMOL/L (ref 3.5–5)
SODIUM BLD-SCNC: 142 MMOL/L (ref 132–146)

## 2022-02-01 ENCOUNTER — TELEPHONE (OUTPATIENT)
Dept: PRIMARY CARE CLINIC | Age: 72
End: 2022-02-01

## 2022-02-01 NOTE — TELEPHONE ENCOUNTER
Tell her we are not sending her there because of the kidney function.  It is because of her persistent elevated potassium

## 2022-02-01 NOTE — TELEPHONE ENCOUNTER
Pt calling in and asking if you are okay with her cancelling her appointment with the Nephrologist. Was told by Dr Az Ward that her kidney functions were good.

## 2022-02-08 NOTE — TELEPHONE ENCOUNTER
Pt is calling again to see if you still want her to go and see the kidney doctor after her last K coming back normal

## 2022-02-21 ENCOUNTER — OFFICE VISIT (OUTPATIENT)
Dept: PODIATRY | Age: 72
End: 2022-02-21
Payer: MEDICARE

## 2022-02-21 VITALS — BODY MASS INDEX: 30.04 KG/M2 | HEIGHT: 60 IN | WEIGHT: 153 LBS

## 2022-02-21 DIAGNOSIS — L84 CORNS AND CALLUS: ICD-10-CM

## 2022-02-21 DIAGNOSIS — M20.41 HAMMER TOES OF BOTH FEET: Primary | ICD-10-CM

## 2022-02-21 DIAGNOSIS — R26.2 DIFFICULTY WALKING: ICD-10-CM

## 2022-02-21 DIAGNOSIS — E11.9 DIET-CONTROLLED DIABETES MELLITUS (HCC): ICD-10-CM

## 2022-02-21 DIAGNOSIS — M20.42 HAMMER TOES OF BOTH FEET: Primary | ICD-10-CM

## 2022-02-21 PROCEDURE — G8417 CALC BMI ABV UP PARAM F/U: HCPCS | Performed by: PODIATRIST

## 2022-02-21 PROCEDURE — 4040F PNEUMOC VAC/ADMIN/RCVD: CPT | Performed by: PODIATRIST

## 2022-02-21 PROCEDURE — G8399 PT W/DXA RESULTS DOCUMENT: HCPCS | Performed by: PODIATRIST

## 2022-02-21 PROCEDURE — 1090F PRES/ABSN URINE INCON ASSESS: CPT | Performed by: PODIATRIST

## 2022-02-21 PROCEDURE — 1036F TOBACCO NON-USER: CPT | Performed by: PODIATRIST

## 2022-02-21 PROCEDURE — 2022F DILAT RTA XM EVC RTNOPTHY: CPT | Performed by: PODIATRIST

## 2022-02-21 PROCEDURE — 3044F HG A1C LEVEL LT 7.0%: CPT | Performed by: PODIATRIST

## 2022-02-21 PROCEDURE — 99213 OFFICE O/P EST LOW 20 MIN: CPT | Performed by: PODIATRIST

## 2022-02-21 PROCEDURE — G8482 FLU IMMUNIZE ORDER/ADMIN: HCPCS | Performed by: PODIATRIST

## 2022-02-21 PROCEDURE — 1123F ACP DISCUSS/DSCN MKR DOCD: CPT | Performed by: PODIATRIST

## 2022-02-21 PROCEDURE — 3017F COLORECTAL CA SCREEN DOC REV: CPT | Performed by: PODIATRIST

## 2022-02-21 PROCEDURE — G8427 DOCREV CUR MEDS BY ELIG CLIN: HCPCS | Performed by: PODIATRIST

## 2022-02-21 RX ORDER — AMMONIUM LACTATE 12 G/100G
LOTION TOPICAL
Qty: 222 ML | Refills: 5 | Status: SHIPPED
Start: 2022-02-21 | End: 2022-10-07

## 2022-02-21 NOTE — PROGRESS NOTES
Patient is here for callus and corns.  Clarice Causey DO 01/21/2022  Electronically signed by Luiz Ladd LPN on 8/29/7398 at 90:07 AM
Allergen Reactions    Azithromycin      Stomach pains    Doxycycline      Stomach pains    Zanaflex [Tizanidine]      Stomach pains       Vitals:    02/21/22 1135   Weight: 153 lb (69.4 kg)   Height: 5' (1.524 m)       Exam:  VASCULAR: Pedal pulses palpable bilateral foot  NEUROLOGICAL: Epicritic sensations intact and symmetrical  DERMATOLOGICAL: Hyperkeratotic areas noted to the medial DIPJ region left fifth toe and lateral PIPJ region right fifth toe. No maceration webspaces noted bilateral foot. MUSCULOSKELETAL: Contraction deformities noted lesser digits bilateral foot. Diagnostic Studies:     No results found. Procedures:    None    Plan Per Assessment  Andrew Briceno was seen today for callouses. Diagnoses and all orders for this visit:    Hammer toes of both feet    Corns and callus  -     ammonium lactate (LAC-HYDRIN) 12 % lotion; Apply topically daily. Diet-controlled diabetes mellitus (HCC)  -     ammonium lactate (LAC-HYDRIN) 12 % lotion; Apply topically daily. Difficulty walking      1. Evaluation and management  2. Partial thickness debridement callused regions performed bilateral foot to patient tolerance. 3. We did discuss additional diabetic foot care techniques with patient in detail today. Prescription was given today for topical Lac-Hydrin lotion to apply to the affected regions daily. 4. Patient will be followed up at a later date if any additional Podiatric issues arise. Seen By:    Damaso Gonzalez DPM    Electronically signed by Damaso Gonzalez DPM on 2/21/2022 at 12:04 PM    This note was created using voice recognition software. The note was reviewed however may contain grammatical errors.

## 2022-02-22 RX ORDER — FLUTICASONE PROPIONATE 50 MCG
1 SPRAY, SUSPENSION (ML) NASAL 2 TIMES DAILY
Qty: 1 EACH | Refills: 3 | Status: SHIPPED
Start: 2022-02-22 | End: 2022-10-13 | Stop reason: SDUPTHER

## 2022-04-18 NOTE — PROGRESS NOTES
[facility] Electrophysiology  Consultation Report  PATIENT: Derick Bowens  MEDICAL RECORD NUMBER: [id number]  DATE OF SERVICE:  4/19/2022  ATTENDING ELECTROPHYSIOLOGIST: Adriana Giron MD  REFERRING PHYSICIAN: Kenny Bowman DO and Doreen Robbins DO  CHIEF COMPLAINT: Palpitations    HPI: This is a 66 y.o. female with a history of   Patient Active Problem List   Diagnosis    Acquired hypothyroidism    Anxiety    Cervical spinal stenosis    Ganglion of flexor tendon sheath of right middle finger    History of breast cancer    Essential hypertension    Chronic gastritis without bleeding    Primary osteoarthritis of left knee    Gastroesophageal reflux disease without esophagitis    Spondylosis of lumbar region without myelopathy or radiculopathy    Pre-diabetes    Palpitations    Dermatitis    Chronic otitis externa of right ear    Mixed hyperlipidemia    Lumbar herniated disc    Primary osteoarthritis involving multiple joints    Diet-controlled diabetes mellitus ([address]    Hyperkalemia    Hammer toes of both feet    Corns and callus   who presents to cardiac electrophysiology clinic for consultation of palpitations. The patient has history of hypertension and hypothyroidism. She reports palpitations that have being ongoing for the past few months. She underwent 24 hours Holter monitor in October 2021 which showed rare PACs and PVCs. Triggered events were correlated with sinus rhythm. She underwent 14 day cardiac monitor which showed one episode of SVT of 6.3 seconds was noted. Triggered events and reported symptoms were correlated mostly with normal sinus rhythm and two episodes were associated with sinus rhythm and PVCs. Since wearing the monitor she reports dizziness along with palpitations that occurs almost daily.  She goes to report waking up and feeling like she \"can not catch her breath\" and minor chest discomfort which she relates to her back issues. Today she presents in SR. The patient denies any chest pain, dyspnea, syncope, orthopnea or paroxysmal nocturnal dyspnea. She denies a family history of SCD. Her mother passed away from a MI at age 79, which was complicated by diabetes.      Patient Active Problem List    Diagnosis Date Noted    Hammer toes of both feet 02/21/2022    Corns and callus 02/21/2022    Diet-controlled diabetes mellitus ([address] 01/21/2022    Hyperkalemia 01/21/2022    Lumbar herniated disc 03/12/2021    Primary osteoarthritis involving multiple joints 03/12/2021    Mixed hyperlipidemia 01/11/2021    Dermatitis 08/21/2020    Chronic otitis externa of right ear 08/21/2020    Palpitations 07/08/2020    Gastroesophageal reflux disease without esophagitis 01/08/2020    Spondylosis of lumbar region without myelopathy or radiculopathy 01/08/2020    Pre-diabetes 01/08/2020    Essential hypertension 07/08/2019    Chronic gastritis without bleeding 07/08/2019    Primary osteoarthritis of left knee 07/08/2019    History of breast cancer 06/08/2018    Ganglion of flexor tendon sheath of right middle finger     Cervical spinal stenosis 04/28/2017    Acquired hypothyroidism 06/06/2012    Anxiety 06/06/2012       Past Medical History:   Diagnosis Date    Abdominal pain, other specified site 6/6/2012    Allergic urticaria     Cancer ([address]     1999 right breast cancer / treated with surgery and chemo    Depression 2000    Diet-controlled diabetes mellitus ([address]     Hyperlipidemia     DENIES    Hypothyroidism     Myofascial pain syndrome 2017    Pelvic - Dr Oscar Noyola    Osteoarthritis cervical spine     cervical spine    Pancreatic cancer ([address] 2007    [facility] Procedure [facility]    Pancreatitis, acute 6/6/2012    Prolonged emergence from general anesthesia        Family History   Problem Relation Age of Onset    Coronary Art Dis Mother     Diabetes Mother     Colon Cancer Father  Diabetes Other     Colon Cancer Other     Atrial Fibrillation Brother     High Blood Pressure Brother     High Cholesterol Brother     High Cholesterol Brother     High Blood Pressure Brother        Social History     Tobacco Use    Smoking status: Never Smoker    Smokeless tobacco: Never Used   Substance Use Topics    Alcohol use: No       Current Outpatient Medications   Medication Sig Dispense Refill    fluticasone (FLONASE) 50 MCG/ACT nasal spray 1 spray by Each Nostril route 2 times daily 1 each 3    ammonium lactate (LAC-HYDRIN) 12 % lotion Apply topically daily. 222 mL 5    FLUoxetine (PROZAC) 10 MG capsule Take 1 capsule by mouth daily 90 capsule 3    levothyroxine (SYNTHROID) 100 MCG tablet One three times a week 50 tablet 12    levothyroxine (SYNTHROID) 88 MCG tablet One four times a week 80 tablet 12    metoprolol succinate (TOPROL XL) 25 MG extended release tablet Take 1 tablet by mouth 2 times daily (Patient taking differently: Take 25 mg by mouth daily ) 180 tablet 3    Cranberry-Cholecalciferol 4200-500 MG-UNIT CAPS Take by mouth      calcium carbonate 600 MG TABS tablet Take 1 tablet by mouth daily      Cyanocobalamin (VITAMIN B-12) 1000 MCG SUBL Place under the tongue       No current facility-administered medications for this visit. Allergies   Allergen Reactions    Azithromycin      Stomach pains    Doxycycline      Stomach pains    Zanaflex [Tizanidine]      Stomach pains     ROS:   Constitutional: Negative for fever, activity change and appetite change. HENT: Negative for epistaxis. Eyes: Negative for diploplia, blurred vision. Respiratory: Negative for cough, chest tightness, shortness of breath and wheezing. Cardiovascular: pertinent positives in HPI  Gastrointestinal: Negative for abdominal pain and blood in stool.    All other review of systems are negative     PHYSICAL EXAM:   Vitals:    04/19/22 1249   BP: 124/76   Pulse: 74   Resp: 18   Weight: 156 lb (70.8 kg)   Height: 5' (1.524 m)      Constitutional: Well-developed, no acute distress  Eyes: conjunctivae normal, no xanthelasma   Ears, Nose, Throat: oral mucosa moist, no cyanosis   CV: no JVD. Regular rate and rhythm. Normal S1S2 and no S3. No murmurs, rubs, or gallops. PMI is nondisplaced  Lungs: clear to auscultation bilaterally, normal respiratory effort without used of accessory muscles  Abdomen: soft, non-tender, bowel sounds present, no masses or hepatomegaly   Musculoskeletal: no digital clubbing, no edema   Skin: warm, no rashes     I have personally reviewed the laboratory, cardiac diagnostic and radiographic testing as outlined below:    Data:    No results for input(s): WBC, HGB, HCT, PLT in the last 72 hours. No results for input(s): NA, K, CL, CO2, BUN, CREATININE, GLU, CALCIUM in the last 72 hours. Invalid input(s): MAGNESIUM   No results found for: MG  No results for input(s): TSH in the last 72 hours. No results for input(s): INR in the last 72 hours. EKG 04/19/22: NSR 74, low voltage QRS, QTc 406 ms, no delta wave. Please see scan in Cardiology. Echocardiogram 9/18/17:   Findings      Left Ventricle   Left ventricular internal dimensions, wall thickness, regional wall   motion, and systolic function appear to be normal.   Ejection fraction is visually estimated at 60%. There is Doppler evidence for stage I diastolic dysfunction. Right Ventricle   Normal right ventricular size and systolic function. TAPSE is 24 mm consistent with normal global right ventricular systolic   function. Left Atrium   The left atrium appeared normal in size. No obvious mass or thrombus was   seen. Right Atrium   Normal right atrium. Mitral Valve   Normal mitral leaflet structure and coaptation with no more than trace   (physiologic) mitral insufficiency. Tricuspid Valve   Normal tricuspid valve structure and function. Physiologic tricuspid   insufficiency noted.  Right ventricular systolic pressure is within normal   limits. Aortic Valve   The aortic valve was not well visualized, but appeared grossly normal.      Pulmonic Valve   Normal pulmonic valve structure and function. Physiologic pulmonic   insufficiency is present. Pericardial Effusion   No evidence for hemodynamically significant pericardial effusion. Aorta   Normal aortic root and ascending aorta. Miscellaneous   The inferior vena cava diameter is normal with normal respiratory   variation. Conclusions      Summary   Left ventricular internal dimensions, wall thickness, regional wall   motion, and systolic function appear to be normal.   Ejection fraction is visually estimated at 60%. There is Doppler evidence for stage I diastolic dysfunction. No obvious abnormality of valve function was seen. Signature      ----------------------------------------------------------------   Electronically signed by Alisson Puentes MD(Interpreting   physician) on 09/18/2017 05:19 PM   ----------------------------------------------------------------    24 hour Holter monitor 10/27/21:   Baseline rhythm was normal sinus. Rare isolated PACs and PVCs were observed. Five beats of SVT was noted. No VT or PAF. No significant pause or AV block. Triggered events and reported symptoms were correlated with normal sinus rhythm. Fourteen day cardiac monitor 12/20/21:  Baseline rhythm was normal sinus. Rare isolated PACs and PVCs were observed. One episode of SVT of 6.3 seconds was noted. No VT or PAF. No significant pause or AV block. Triggered events and reported symptoms were correlated mostly with normal sinus rhythm and two episodes were associated with sinus rhythm and PVCs. I have independently reviewed all of the ECGs and rhythm strips per above     Assessment/Plan:  This is a 66 y.o. female with a history of   Patient Active Problem List   Diagnosis    Acquired hypothyroidism    Anxiety    Cervical spinal stenosis    Ganglion of flexor tendon sheath of right middle finger    History of breast cancer    Essential hypertension    Chronic gastritis without bleeding    Primary osteoarthritis of left knee    Gastroesophageal reflux disease without esophagitis    Spondylosis of lumbar region without myelopathy or radiculopathy    Pre-diabetes    Palpitations    Dermatitis    Chronic otitis externa of right ear    Mixed hyperlipidemia    Lumbar herniated disc    Primary osteoarthritis involving multiple joints    Diet-controlled diabetes mellitus (HCC)    Hyperkalemia    Hammer toes of both feet    Corns and callus    who presents with palpitations. 1. Palpitations  - Unclear etiology   - Thus far monitor showed that symptoms were correlated with sinus rhythm. - On Toprol XL.  - Reassured. 2. Paroxysmal supraventricular tachycardia  - Asymptomatic.  - Documented on 14 day cardiac monitor of one episode of SVT of 6.3 seconds. - On Toprol XL. 3.  Hypertension   - Well controlled. - On Toprol XL. 4. Hypothyroidism  - On Synthroid    Recommendations:  1. Will obtain a 14 day Zio XT to try and correlate symptoms with arrhythmias -will call with results. 2. Echocardiogram to evaluate LV function. 3. Continue Toprol XL 25 mg daily. 4. Follow up in 3 months or sooner PRN. Encouraged the patient to call the office for any questions or concerns. I have spent a total of 60 minutes with the patient and the family reviewing the above stated recommendations. And a total of >50% of that time involved face-to-face time providing counseling and or coordination of care with the other providers, preparation for the clinic visit, reviewing records/tests, counseling/education of the patient, ordering medications/tests/procedures, coordinating care, and documenting clinical information in the EHR. Thank you for allowing me to participate in your patient's care.   Please call me if there are any questions or concerns.       Jennifer Blanton MD  Cardiac Electrophysiology  [address]  [facility] [place] [place] [facility]  12:56 PM  4/19/2022

## 2022-04-19 ENCOUNTER — OFFICE VISIT (OUTPATIENT)
Dept: NON INVASIVE DIAGNOSTICS | Age: 72
End: 2022-04-19
Payer: MEDICARE

## 2022-04-19 VITALS
RESPIRATION RATE: 18 BRPM | HEART RATE: 74 BPM | DIASTOLIC BLOOD PRESSURE: 76 MMHG | SYSTOLIC BLOOD PRESSURE: 124 MMHG | BODY MASS INDEX: 30.63 KG/M2 | WEIGHT: 156 LBS | HEIGHT: 60 IN

## 2022-04-19 DIAGNOSIS — R00.2 PALPITATIONS: Primary | ICD-10-CM

## 2022-04-19 PROCEDURE — 93000 ELECTROCARDIOGRAM COMPLETE: CPT | Performed by: INTERNAL MEDICINE

## 2022-04-19 PROCEDURE — G8417 CALC BMI ABV UP PARAM F/U: HCPCS | Performed by: INTERNAL MEDICINE

## 2022-04-19 PROCEDURE — G8399 PT W/DXA RESULTS DOCUMENT: HCPCS | Performed by: INTERNAL MEDICINE

## 2022-04-19 PROCEDURE — G8427 DOCREV CUR MEDS BY ELIG CLIN: HCPCS | Performed by: INTERNAL MEDICINE

## 2022-04-19 PROCEDURE — 1090F PRES/ABSN URINE INCON ASSESS: CPT | Performed by: INTERNAL MEDICINE

## 2022-04-19 PROCEDURE — 4040F PNEUMOC VAC/ADMIN/RCVD: CPT | Performed by: INTERNAL MEDICINE

## 2022-04-19 PROCEDURE — 1036F TOBACCO NON-USER: CPT | Performed by: INTERNAL MEDICINE

## 2022-04-19 PROCEDURE — 1123F ACP DISCUSS/DSCN MKR DOCD: CPT | Performed by: INTERNAL MEDICINE

## 2022-04-19 PROCEDURE — 3017F COLORECTAL CA SCREEN DOC REV: CPT | Performed by: INTERNAL MEDICINE

## 2022-04-19 PROCEDURE — 99205 OFFICE O/P NEW HI 60 MIN: CPT | Performed by: INTERNAL MEDICINE

## 2022-04-19 NOTE — PROGRESS NOTES
Patient was seen in Office today to have 14 day zio xt monitor put on per Dr. Leatha Brantley. Pt tolerated placement and understood use and return of device number [id number].     Electronically signed by Jonna Chavez MA on 4/19/2022 at 1:10 PM

## 2022-04-25 ENCOUNTER — OFFICE VISIT (OUTPATIENT)
Dept: FAMILY MEDICINE CLINIC | Age: 72
End: 2022-04-25
Payer: MEDICARE

## 2022-04-25 VITALS — WEIGHT: 151 LBS | HEIGHT: 60 IN | BODY MASS INDEX: 29.64 KG/M2 | TEMPERATURE: 97 F

## 2022-04-25 DIAGNOSIS — T23.102A BURN, HAND, FIRST DEGREE, LEFT, INITIAL ENCOUNTER: Primary | ICD-10-CM

## 2022-04-25 PROCEDURE — 99213 OFFICE O/P EST LOW 20 MIN: CPT | Performed by: FAMILY MEDICINE

## 2022-04-25 PROCEDURE — 1123F ACP DISCUSS/DSCN MKR DOCD: CPT | Performed by: FAMILY MEDICINE

## 2022-04-25 PROCEDURE — 90471 IMMUNIZATION ADMIN: CPT | Performed by: FAMILY MEDICINE

## 2022-04-25 PROCEDURE — G8428 CUR MEDS NOT DOCUMENT: HCPCS | Performed by: FAMILY MEDICINE

## 2022-04-25 PROCEDURE — 1090F PRES/ABSN URINE INCON ASSESS: CPT | Performed by: FAMILY MEDICINE

## 2022-04-25 PROCEDURE — 3017F COLORECTAL CA SCREEN DOC REV: CPT | Performed by: FAMILY MEDICINE

## 2022-04-25 PROCEDURE — 4040F PNEUMOC VAC/ADMIN/RCVD: CPT | Performed by: FAMILY MEDICINE

## 2022-04-25 PROCEDURE — G8399 PT W/DXA RESULTS DOCUMENT: HCPCS | Performed by: FAMILY MEDICINE

## 2022-04-25 PROCEDURE — 1036F TOBACCO NON-USER: CPT | Performed by: FAMILY MEDICINE

## 2022-04-25 PROCEDURE — G8417 CALC BMI ABV UP PARAM F/U: HCPCS | Performed by: FAMILY MEDICINE

## 2022-04-25 RX ORDER — CEPHALEXIN 500 MG/1
500 CAPSULE ORAL 3 TIMES DAILY
Qty: 21 CAPSULE | Refills: 0 | Status: SHIPPED | OUTPATIENT
Start: 2022-04-25 | End: 2022-05-02

## 2022-04-25 ASSESSMENT — PATIENT HEALTH QUESTIONNAIRE - PHQ9
SUM OF ALL RESPONSES TO PHQ QUESTIONS 1-9: 0
SUM OF ALL RESPONSES TO PHQ QUESTIONS 1-9: 0
1. LITTLE INTEREST OR PLEASURE IN DOING THINGS: 0
2. FEELING DOWN, DEPRESSED OR HOPELESS: 0
SUM OF ALL RESPONSES TO PHQ QUESTIONS 1-9: 0
SUM OF ALL RESPONSES TO PHQ9 QUESTIONS 1 & 2: 0
SUM OF ALL RESPONSES TO PHQ QUESTIONS 1-9: 0

## 2022-04-25 ASSESSMENT — ENCOUNTER SYMPTOMS
ROS SKIN COMMENTS: SEE HPI
RESPIRATORY NEGATIVE: 1

## 2022-04-25 NOTE — PROGRESS NOTES
22  Name: Cece Alexander    : 1950    Sex: female    Age: 66 y.o. Subjective:  Chief Complaint: Patient is here for burn left  hand     Burn to her left hand 9 days ago. Small irritation. No chills or temperature. No loss of function or nerve sensation      Review of Systems   Respiratory: Negative. Skin:        See HPI         Current Outpatient Medications:     cephALEXin (KEFLEX) 500 MG capsule, Take 1 capsule by mouth 3 times daily for 7 days, Disp: 21 capsule, Rfl: 0    mupirocin (BACTROBAN) 2 % ointment, Apply topically 3 times daily. , Disp: 1 each, Rfl: 2    fluticasone (FLONASE) 50 MCG/ACT nasal spray, 1 spray by Each Nostril route 2 times daily, Disp: 1 each, Rfl: 3    ammonium lactate (LAC-HYDRIN) 12 % lotion, Apply topically daily. , Disp: 222 mL, Rfl: 5    FLUoxetine (PROZAC) 10 MG capsule, Take 1 capsule by mouth daily, Disp: 90 capsule, Rfl: 3    levothyroxine (SYNTHROID) 100 MCG tablet, One three times a week, Disp: 50 tablet, Rfl: 12    levothyroxine (SYNTHROID) 88 MCG tablet, One four times a week, Disp: 80 tablet, Rfl: 12    metoprolol succinate (TOPROL XL) 25 MG extended release tablet, Take 1 tablet by mouth 2 times daily (Patient taking differently: Take 25 mg by mouth daily ), Disp: 180 tablet, Rfl: 3    Cranberry-Cholecalciferol 4200-500 MG-UNIT CAPS, Take by mouth, Disp: , Rfl:     calcium carbonate 600 MG TABS tablet, Take 1 tablet by mouth daily, Disp: , Rfl:     Cyanocobalamin (VITAMIN B-12) 1000 MCG SUBL, Place under the tongue, Disp: , Rfl:   Allergies   Allergen Reactions    Azithromycin      Stomach pains    Doxycycline      Stomach pains    Zanaflex [Tizanidine]      Stomach pains     Social History     Socioeconomic History    Marital status:      Spouse name: Not on file    Number of children: Not on file    Years of education: Not on file    Highest education level: Not on file   Occupational History    Not on file   Tobacco Use  Smoking status: Never Smoker    Smokeless tobacco: Never Used   Vaping Use    Vaping Use: Never used   Substance and Sexual Activity    Alcohol use: No    Drug use: No    Sexual activity: Not on file   Other Topics Concern    Not on file   Social History Narrative        HYPOTHYROIDISM    DEPRESSION Lena    PANCREATIC CA DIAGNOSED  [address] but later found not to be cancer    Karina Howe  1950 Page #2    RIGHT BREAST CA WITH 13 POSITIVE LYMPH NODES  WITH BILATERAL MASTECTOMY AND    IMPLANTS. ---SEES DR ALBERT    Hysterectomy, left 1 ovary    right inguinal hernia repair    cataracts with IOL bilateral    FAMILY HISTORY OF COLON CA, DIABETES    DIET-CONTROLLED DIABETES    ELEVATED LIVER FUNCTION    HYPERLIPIDEMIA, ON STATINS IN THE PAST    LAST A1C 6.0    LAST CHOLESTEROL 231    CERV DISC OR DR ALBERT ---    ALLERGIES IN THE PAST. SAW DR. 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, THE    DENTIST    COLONOSCOPY ---DR RIVER---DIFFICULT TO PASS--PT REFUSES ANY MORE    MRI - WITH CERVICAL MOD SPINAL STENOSIS---EVAL DR Castillo Rather--- PT CANCELLED    SHOT    Eval dr Aníbal Albright for gi  -   gave probitoic and pepcid    USE TO WORK WITH [employer]    CT HEAD WITH ANGIOMA---- PT STATES WAS THERE YRS AGO    MYOFACIAL PELVIC PAIN SYNDROME  DR GARCIA----    RIGHT THIRD FINGER OR DR GONZALEZ 10-17    EVAL CARDIO WITH NEG ECHO 10-17 DR JOSE BAPTISTE    PREVAR     EV[facility] DR [facility]----- [facility]-VAG FISTULA BUT MARCIAL DOUBTS- -    NO UTI SINCE USING VAG COCUNUT OIL -    egd  -  Dr Adarsh Romeo  gasgritis    Low  back pain  owih  shots dr Celena Whitley      ablation x two     Social Determinants of Health     Financial Resource Strain:     Difficulty of Paying Living Expenses: Not on file   Food Insecurity:     Worried About Running Out of Food in the Last Year: Not on file    Mikhail of Food in the Last Year: Not on file   Transportation Needs:     Lack of Transportation (Medical): Not on file    Lack of Transportation (Non-Medical):  Not on file   Physical Activity:     Days of Exercise per Week: Not on file    Minutes of Exercise per Session: Not on file   Stress:     Feeling of Stress : Not on file   Social Connections:     Frequency of Communication with Friends and Family: Not on file    Frequency of Social Gatherings with Friends and Family: Not on file    Attends [employer] Services: Not on file    Active Member of [address] [place] [address] [employer] or Organizations: Not on file    Attends Club or Organization Meetings: Not on file    Marital Status: Not on file   Intimate Partner Violence:     Fear of Current or Ex-Partner: Not on file    Emotionally Abused: Not on file    Physically Abused: Not on file    Sexually Abused: Not on file   Housing Stability:     Unable to Pay for Housing in the Last Year: Not on file    Number of [place] in the Last Year: Not on file    Unstable Housing in the Last Year: Not on file      Past Medical History:   Diagnosis Date    Abdominal pain, other specified site 6/6/2012    Allergic urticaria     Cancer ([address]     1999 right breast cancer / treated with surgery and chemo    Depression 2000    Diet-controlled diabetes mellitus ([address]     Hyperlipidemia     DENIES    Hypothyroidism     Myofascial pain syndrome 2017    Pelvic - Dr Carolyn Meléndez    Osteoarthritis cervical spine     cervical spine    Pancreatic cancer [facility]) 2007    [facility] - [facility]    Pancreatitis, acute 6/6/2012    Prolonged emergence from general anesthesia      Family History   Problem Relation Age of Onset    Coronary Art Dis Mother     Diabetes Mother    Ema Andrew Colon Cancer Father     Diabetes Other     Colon Cancer Other     Atrial Fibrillation Brother     High Blood Pressure Brother     High Cholesterol Brother     High Cholesterol Brother     High Blood Pressure Brother       Past Surgical History:   Procedure Laterality Date    BREAST RECONSTRUCTION Bilateral 07/25/2019    CAPSULECTOMY OF LEFT BREAST WITH REMOVAL OF SALINE  IMPLANTS AND REPLACMENT WITH BILATERAL SILICONE IMPLANTS performed by Paz Grullon MD at [place] [address] Bilateral 2012   [facility] [address]    CHOLECYSTECTOMY  2007    with Terrial Royal  2015    Dr. Twyla Iniguez    to repair muscle     HERNIA REPAIR Right years ago    inguinal    HYSTERECTOMY  1978    MASTECTOMY, BILATERAL Bilateral 1999    radical mastectomy r / mastectomy at left    OTHER SURGICAL HISTORY Right 10/18/2017    EXCISION GANGLION CYST RIGHT MIDDLE FINGER    PANCREATECTOMY  2007    Whipple 1/3 taken       Vitals:    04/25/22 1122   Temp: 97 °F (36.1 °C)   TempSrc: Infrared   Weight: 151 lb (68.5 kg)   Height: 5' (1.524 m)       Objective:    Physical Exam  Cardiovascular:      Rate and Rhythm: Regular rhythm. Heart sounds: Normal heart sounds. Skin:     Comments: Left lateral hand proximal to the thumb with area of 1cm dark/dry scab with very faint erythema around the edge with no loss of function. Full range of motion. No loss of sensation         Abhilash Fan was seen today for burn. Diagnoses and all orders for this visit:    Burn, hand, first degree, left, initial encounter  -     Tdap (age 6y and older) IM (BOOSTRIX)  -     cephALEXin (KEFLEX) 500 MG capsule; Take 1 capsule by mouth 3 times daily for 7 days  -     mupirocin (BACTROBAN) 2 % ointment; Apply topically 3 times daily. Comments: Unable to debride. Already attached. Dressings 3 times a day. See me back in 2 days if not resolved.   If worse go to ER.  Call if any symptoms worsen      I educated the patient about all medications. Make sure they were correct and educated  on the risk associated with missing meds or taking them incorrectly. A list of medications is being sent home with patient today. Check blood pressure at home twice a day. Low-salt low caffeine diet. Call if systolic blood pressure is above 574 and diastolic blood pressures above 85. Only use a upper arm digital cuff. I informed patient about the risk associated with noncompliance of medication and taking medications incorrectly. Appropriate follow-up with myself and all specialist.  Encourage family members to take active role in assisting with medications and medical care. If any confusion should develop to notify my office immediately to avoid risk of worsening medical condition    A great deal of time spent reviewing medications, diet, exercise, social issues. Also reviewing the chart before entering the room with patient and finishing charting after leaving patient's room. More than half of that time was spent face to face with the patient in counseling and coordinating care. Follow Up: Return if symptoms worsen or fail to improve, for Meds. If worse go to ER. Not better in 24 hr see.      Seen by:  Jenelle Rose,

## 2022-05-05 ENCOUNTER — TELEPHONE (OUTPATIENT)
Dept: NON INVASIVE DIAGNOSTICS | Age: 72
End: 2022-05-05

## 2022-05-05 DIAGNOSIS — R00.2 PALPITATIONS: ICD-10-CM

## 2022-05-05 NOTE — TELEPHONE ENCOUNTER
----- Message from Ricco Menard MD sent at 5/5/2022  9:33 AM EDT -----  Brief SVTs noted but symptoms appeared to be related to extrasystoles which were benign. Can try to titrate up Toprol XL if she prefers.  Thanks.  ----- Message -----  From: Bandar Mancini MA  Sent: 5/5/2022   8:17 AM EDT  To: Ricco Menard MD

## 2022-05-05 NOTE — TELEPHONE ENCOUNTER
I spoke to Karissa Tamez and gave her monitor results and she states she will keep metoprolol the same. When she tried to go up in the past she felt worse.

## 2022-05-10 ENCOUNTER — TELEPHONE (OUTPATIENT)
Dept: CARDIOLOGY | Age: 72
End: 2022-05-10

## 2022-05-10 NOTE — TELEPHONE ENCOUNTER
CALLED PATIENT AND LEFT MESSAGE TO SCHEDULE ECHO    Electronically signed by Merna Bruner on 5/10/2022 at 8:33 AM

## 2022-06-10 ENCOUNTER — TELEPHONE (OUTPATIENT)
Dept: CARDIOLOGY | Age: 72
End: 2022-06-10

## 2022-06-13 ENCOUNTER — HOSPITAL ENCOUNTER (OUTPATIENT)
Dept: CARDIOLOGY | Age: 72
Discharge: HOME | End: 2022-06-13
Payer: MEDICARE

## 2022-06-13 DIAGNOSIS — R00.2 PALPITATIONS: ICD-10-CM

## 2022-06-13 LAB
LV EF: 60 %
LVEF MODALITY: NORMAL

## 2022-06-13 PROCEDURE — 93306 TTE W/DOPPLER COMPLETE: CPT

## 2022-06-15 ENCOUNTER — TELEPHONE (OUTPATIENT)
Dept: NON INVASIVE DIAGNOSTICS | Age: 72
End: 2022-06-15

## 2022-06-15 NOTE — TELEPHONE ENCOUNTER
Left detailed message with the results. If the patient has further questions instructed her to call the office back.

## 2022-06-15 NOTE — TELEPHONE ENCOUNTER
----- Message from Tara Villalobos sent at 6/14/2022  4:17 PM EDT -----    ----- Message -----  From: Jordin Nettles MD  Sent: 6/14/2022   4:12 PM EDT  To: Tara Villalobos    Echo showed normal LV function.  Thanks.  ----- Message -----  From: Babita Ricardo Incoming Cardiology Results From [facility]  Sent: 6/14/2022   4:06 PM EDT  To: Jordin Nettles MD

## 2022-07-11 DIAGNOSIS — M81.0 AGE-RELATED OSTEOPOROSIS WITHOUT CURRENT PATHOLOGICAL FRACTURE: ICD-10-CM

## 2022-07-11 DIAGNOSIS — E03.9 ACQUIRED HYPOTHYROIDISM: ICD-10-CM

## 2022-07-11 DIAGNOSIS — I10 ESSENTIAL HYPERTENSION: Chronic | ICD-10-CM

## 2022-07-11 DIAGNOSIS — E11.9 DIET-CONTROLLED DIABETES MELLITUS (HCC): ICD-10-CM

## 2022-07-11 LAB
ALBUMIN SERPL-MCNC: 4.1 G/DL (ref 3.5–5.2)
ALP BLD-CCNC: 102 U/L (ref 35–104)
ALT SERPL-CCNC: 11 U/L (ref 0–32)
ANION GAP SERPL CALCULATED.3IONS-SCNC: 13 MMOL/L (ref 7–16)
AST SERPL-CCNC: 14 U/L (ref 0–31)
BACTERIA: ABNORMAL /HPF
BASOPHILS ABSOLUTE: 0.04 E9/L (ref 0–0.2)
BASOPHILS RELATIVE PERCENT: 0.6 % (ref 0–2)
BILIRUB SERPL-MCNC: 0.6 MG/DL (ref 0–1.2)
BILIRUBIN URINE: NEGATIVE
BLOOD, URINE: ABNORMAL
BUN BLDV-MCNC: 15 MG/DL (ref 6–23)
CALCIUM SERPL-MCNC: 9.1 MG/DL (ref 8.6–10.2)
CHLORIDE BLD-SCNC: 103 MMOL/L (ref 98–107)
CHOLESTEROL, TOTAL: 215 MG/DL (ref 0–199)
CLARITY: CLEAR
CO2: 24 MMOL/L (ref 22–29)
COLOR: YELLOW
CREAT SERPL-MCNC: 0.8 MG/DL (ref 0.5–1)
EOSINOPHILS ABSOLUTE: 0.25 E9/L (ref 0.05–0.5)
EOSINOPHILS RELATIVE PERCENT: 3.8 % (ref 0–6)
GFR AFRICAN AMERICAN: >60
GFR NON-AFRICAN AMERICAN: >60 ML/MIN/1.73
GLUCOSE BLD-MCNC: 97 MG/DL (ref 74–99)
GLUCOSE URINE: NEGATIVE MG/DL
HBA1C MFR BLD: 5.5 % (ref 4–5.6)
HCT VFR BLD CALC: 39.5 % (ref 34–48)
HDLC SERPL-MCNC: 53 MG/DL
HEMOGLOBIN: 12.4 G/DL (ref 11.5–15.5)
IMMATURE GRANULOCYTES #: 0.02 E9/L
IMMATURE GRANULOCYTES %: 0.3 % (ref 0–5)
KETONES, URINE: NEGATIVE MG/DL
LDL CHOLESTEROL CALCULATED: 135 MG/DL (ref 0–99)
LEUKOCYTE ESTERASE, URINE: ABNORMAL
LYMPHOCYTES ABSOLUTE: 1.82 E9/L (ref 1.5–4)
LYMPHOCYTES RELATIVE PERCENT: 27.5 % (ref 20–42)
MCH RBC QN AUTO: 27.4 PG (ref 26–35)
MCHC RBC AUTO-ENTMCNC: 31.4 % (ref 32–34.5)
MCV RBC AUTO: 87.2 FL (ref 80–99.9)
MONOCYTES ABSOLUTE: 0.71 E9/L (ref 0.1–0.95)
MONOCYTES RELATIVE PERCENT: 10.7 % (ref 2–12)
NEUTROPHILS ABSOLUTE: 3.77 E9/L (ref 1.8–7.3)
NEUTROPHILS RELATIVE PERCENT: 57.1 % (ref 43–80)
NITRITE, URINE: NEGATIVE
PDW BLD-RTO: 13.1 FL (ref 11.5–15)
PH UA: 6 (ref 5–9)
PLATELET # BLD: 284 E9/L (ref 130–450)
PMV BLD AUTO: 11.2 FL (ref 7–12)
POTASSIUM SERPL-SCNC: 5.3 MMOL/L (ref 3.5–5)
PROTEIN UA: NEGATIVE MG/DL
RBC # BLD: 4.53 E12/L (ref 3.5–5.5)
RBC UA: ABNORMAL /HPF (ref 0–2)
SODIUM BLD-SCNC: 140 MMOL/L (ref 132–146)
SPECIFIC GRAVITY UA: 1.02 (ref 1–1.03)
TOTAL PROTEIN: 6.9 G/DL (ref 6.4–8.3)
TRIGL SERPL-MCNC: 135 MG/DL (ref 0–149)
TSH SERPL DL<=0.05 MIU/L-ACNC: 0.08 UIU/ML (ref 0.27–4.2)
UROBILINOGEN, URINE: 0.2 E.U./DL
VITAMIN D 25-HYDROXY: 31 NG/ML (ref 30–100)
VLDLC SERPL CALC-MCNC: 27 MG/DL
WBC # BLD: 6.6 E9/L (ref 4.5–11.5)
WBC UA: ABNORMAL /HPF (ref 0–5)

## 2022-07-12 LAB — T4 TOTAL: 10.1 MCG/DL (ref 4.5–11.7)

## 2022-07-22 ENCOUNTER — OFFICE VISIT (OUTPATIENT)
Dept: PRIMARY CARE CLINIC | Age: 72
End: 2022-07-22
Payer: MEDICARE

## 2022-07-22 VITALS
WEIGHT: 151 LBS | HEIGHT: 60 IN | TEMPERATURE: 98.2 F | SYSTOLIC BLOOD PRESSURE: 128 MMHG | DIASTOLIC BLOOD PRESSURE: 75 MMHG | BODY MASS INDEX: 29.64 KG/M2

## 2022-07-22 DIAGNOSIS — R73.03 PRE-DIABETES: Chronic | ICD-10-CM

## 2022-07-22 DIAGNOSIS — I10 ESSENTIAL HYPERTENSION: Primary | Chronic | ICD-10-CM

## 2022-07-22 DIAGNOSIS — E03.9 ACQUIRED HYPOTHYROIDISM: ICD-10-CM

## 2022-07-22 DIAGNOSIS — E78.2 MIXED HYPERLIPIDEMIA: Chronic | ICD-10-CM

## 2022-07-22 DIAGNOSIS — M81.0 AGE-RELATED OSTEOPOROSIS WITHOUT CURRENT PATHOLOGICAL FRACTURE: ICD-10-CM

## 2022-07-22 DIAGNOSIS — R00.2 PALPITATIONS: Chronic | ICD-10-CM

## 2022-07-22 PROCEDURE — 1123F ACP DISCUSS/DSCN MKR DOCD: CPT | Performed by: FAMILY MEDICINE

## 2022-07-22 PROCEDURE — 99214 OFFICE O/P EST MOD 30 MIN: CPT | Performed by: FAMILY MEDICINE

## 2022-07-22 ASSESSMENT — ENCOUNTER SYMPTOMS
EYES NEGATIVE: 1
GASTROINTESTINAL NEGATIVE: 1
ALLERGIC/IMMUNOLOGIC NEGATIVE: 1
RESPIRATORY NEGATIVE: 1

## 2022-07-22 ASSESSMENT — PATIENT HEALTH QUESTIONNAIRE - PHQ9
SUM OF ALL RESPONSES TO PHQ QUESTIONS 1-9: 0
1. LITTLE INTEREST OR PLEASURE IN DOING THINGS: 0
SUM OF ALL RESPONSES TO PHQ QUESTIONS 1-9: 0
SUM OF ALL RESPONSES TO PHQ QUESTIONS 1-9: 0
SUM OF ALL RESPONSES TO PHQ9 QUESTIONS 1 & 2: 0
SUM OF ALL RESPONSES TO PHQ QUESTIONS 1-9: 0
2. FEELING DOWN, DEPRESSED OR HOPELESS: 0

## 2022-07-22 NOTE — PROGRESS NOTES
22  Name: Sudeep Client    : 1950    Sex: female    Age: 74 y.o. Subjective:  Chief Complaint: Patient is here for    6 mock  re  bp chol  thyoid    heart   palp  oa     Feels ok  no cp  or sob  Saw  cardio   14  d   event  motniro done  no cp rso gayathri      Review of Systems   Constitutional: Negative. HENT: Negative. Eyes: Negative. Respiratory: Negative. Cardiovascular: Negative. Gastrointestinal: Negative. Endocrine: Negative. Genitourinary: Negative. Musculoskeletal: Negative. Skin: Negative. Allergic/Immunologic: Negative. Neurological: Negative. Hematological: Negative. Psychiatric/Behavioral: Negative. Current Outpatient Medications:     fluticasone (FLONASE) 50 MCG/ACT nasal spray, 1 spray by Each Nostril route 2 times daily, Disp: 1 each, Rfl: 3    ammonium lactate (LAC-HYDRIN) 12 % lotion, Apply topically daily. , Disp: 222 mL, Rfl: 5    FLUoxetine (PROZAC) 10 MG capsule, Take 1 capsule by mouth daily, Disp: 90 capsule, Rfl: 3    levothyroxine (SYNTHROID) 100 MCG tablet, One three times a week, Disp: 50 tablet, Rfl: 12    levothyroxine (SYNTHROID) 88 MCG tablet, One four times a week, Disp: 80 tablet, Rfl: 12    metoprolol succinate (TOPROL XL) 25 MG extended release tablet, Take 1 tablet by mouth 2 times daily (Patient taking differently: Take 25 mg by mouth daily ), Disp: 180 tablet, Rfl: 3    Cranberry-Cholecalciferol 4200-500 MG-UNIT CAPS, Take by mouth, Disp: , Rfl:     calcium carbonate 600 MG TABS tablet, Take 1 tablet by mouth daily, Disp: , Rfl:     Cyanocobalamin (VITAMIN B-12) 1000 MCG SUBL, Place under the tongue, Disp: , Rfl:   Allergies   Allergen Reactions    Azithromycin      Stomach pains    Doxycycline      Stomach pains    Zanaflex [Tizanidine]      Stomach pains     Social History     Socioeconomic History    Marital status:      Spouse name: Not on file    Number of children: Not on file    Years of education: Not on file    Highest education level: Not on file   Occupational History    Not on file   Tobacco Use    Smoking status: Never    Smokeless tobacco: Never   Vaping Use    Vaping Use: Never used   Substance and Sexual Activity    Alcohol use: No    Drug use: No    Sexual activity: Not on file   Other Topics Concern    Not on file   Social History Narrative        HYPOTHYROIDISM    [address] but later found not to be cancer    Rand Giraldo  1950 Page #2    RIGHT BREAST CA WITH 13 POSITIVE LYMPH NODES  WITH BILATERAL MASTECTOMY AND    IMPLANTS. ---SEES DR ALBERT    Hysterectomy, left 1 ovary    right inguinal hernia repair    cataracts with IOL bilateral    FAMILY HISTORY OF COLON CA, DIABETES    DIET-CONTROLLED DIABETES    ELEVATED LIVER FUNCTION    HYPERLIPIDEMIA, ON STATINS IN THE PAST    LAST A1C 6.0    LAST CHOLESTEROL 231    CERV DISC OR DR ALBERT ---    ALLERGIES IN THE PAST. SAW DR. 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, THE    DENTIST    COLONOSCOPY ---DR RIVER---DIFFICULT TO PASS--PT REFUSES ANY MORE    MRI - WITH CERVICAL MOD SPINAL STENOSIS---EVAL DR Andrei Morales--- PT CANCELLED    SHOT    Eval dr Liam Thomas for gi  -21   gave probitoic and pepcid    USE TO WORK WITH [employer]    CT HEAD WITH ANGIOMA--- PT STATES WAS THERE YRS AGO    MYOFACIAL PELVIC PAIN SYNDROME - DR GARCIA----    RIGHT THIRD FINGER OR DR GONZALEZ 10-17    EVAL CARDIO WITH NEG ECHO 10-17 DR JOSE BAPTISTE    PREVAR     EVAL DR CEJA-----[facility]-VAG FISTULA BUT MARCIAL DOUBTS- -    NO UTI SINCE USING VAG COCUNUT OIL -    egd    Dr Dallin Jama  gasgritis Low  back pain  owih  shots dr Brandon Castillo   6-21   ablation x two     Social Determinants of Health     Financial Resource Strain: Not on file   Food Insecurity: Not on file   Transportation Needs: Not on file   Physical Activity: Not on file   Stress: Not on file   Social Connections: Not on file   Intimate Partner Violence: Not on file   Housing Stability: Not on file      Past Medical History:   Diagnosis Date    Abdominal pain, other specified site 6/6/2012    Allergic urticaria     Cancer ([facility] [address]     1999 right breast cancer / treated with surgery and chemo    Depression 2000    Diet-controlled diabetes mellitus ([facility] [address]     Hyperlipidemia     DENIES    Hypothyroidism     Myofascial pain syndrome 2017    Pelvic - Dr Surinder Engle    Osteoarthritis cervical spine     cervical spine    Pancreatic cancer ([facility] [address] 2007    Partial Whipple Procedure - [facility]    Pancreatitis, acute 6/6/2012    Prolonged emergence from general anesthesia      Family History   Problem Relation Age of Onset    Coronary Art Dis Mother     Diabetes Mother     Colon Cancer Father     Diabetes Other     Colon Cancer Other     Atrial Fibrillation Brother     High Blood Pressure Brother     High Cholesterol Brother     High Cholesterol Brother     High Blood Pressure Brother       Past Surgical History:   Procedure Laterality Date    BREAST RECONSTRUCTION Bilateral 07/25/2019    CAPSULECTOMY OF LEFT BREAST WITH REMOVAL OF SALINE  IMPLANTS AND REPLACMENT WITH BILATERAL SILICONE IMPLANTS performed by Nikki Nice MD at [facility] 56 Bilateral 2012    [address]    [address] [facility]  2007    with Angely Serna  2015    Dr. Onesimo Barnett    to repair muscle     HERNIA REPAIR Right years ago    inguinal    HYSTERECTOMY (CERVIX STATUS UNKNOWN)  1978    MASTECTOMY, BILATERAL Bilateral 1999    radical mastectomy r / mastectomy at left    OTHER SURGICAL HISTORY Right 10/18/2017    EXCISION GANGLION CYST RIGHT MIDDLE FINGER    PANCREATECTOMY  2007    Whipple 1/3 taken       Vitals:    07/22/22 1128   BP: 128/75   Temp: 98.2 °F (36.8 °C)   TempSrc: Oral   Weight: 151 lb (68.5 kg)   Height: 5' (1.524 m)       Objective:    Physical Exam  Vitals reviewed. Constitutional:       Appearance: Normal appearance. She is well-developed. HENT:      Head: Normocephalic. Right Ear: Tympanic membrane normal.      Left Ear: Tympanic membrane normal.      Nose: Nose normal.      Mouth/Throat:      Mouth: Mucous membranes are moist.   Eyes:      Pupils: Pupils are equal, round, and reactive to light. Cardiovascular:      Rate and Rhythm: Normal rate and regular rhythm. Pulmonary:      Effort: Pulmonary effort is normal.      Breath sounds: Normal breath sounds. Abdominal:      General: Bowel sounds are normal.      Palpations: Abdomen is soft. Musculoskeletal:         General: Normal range of motion. Cervical back: Normal range of motion. Skin:     General: Skin is warm. Neurological:      Mental Status: She is alert and oriented to person, place, and time. Psychiatric:         Behavior: Behavior normal.       Heather Ortiz was seen today for discuss labs. Diagnoses and all orders for this visit:    Essential hypertension    Mixed hyperlipidemia    Palpitations    Pre-diabetes      Comments:  low  fat and sugar  diet    lsoe wt  exer       I educated the patient about all medications. Make sure they were correct and educated  on the risk associated with missing meds or taking them incorrectly. A list of medications is being sent home with patient today. Check blood pressure at home twice a day. Low-salt low caffeine diet. Call if systolic blood pressure is above 190 and diastolic blood pressures above 85. Only use a upper arm digital cuff. Aggressive low-fat diet. Avoid red meats, greasy fried foods, dairy products. Avoid processed foods.   Take cholesterol medications without food. I informed patient about the risk associated with noncompliance of medication and taking medications incorrectly. Appropriate follow-up with myself and all specialist.  Encourage family members to take active role in assisting with medications and medical care. If any confusion should develop to notify my office immediately to avoid risk of worsening medical condition    A great deal of time spent reviewing medications, diet, exercise, social issues. Also reviewing the chart before entering the room with patient and finishing charting after leaving patient's room. More than half of that time was spent face to face with the patient in counseling and coordinating care. Follow Up: Return in about 6 months (around 1/22/2023) for Lab Before.      Seen by:  Neal Allred,

## 2022-07-25 ENCOUNTER — TELEPHONE (OUTPATIENT)
Dept: PRIMARY CARE CLINIC | Age: 72
End: 2022-07-25

## 2022-07-25 NOTE — TELEPHONE ENCOUNTER
----- Message from [place] sent at 7/25/2022 10:22 AM EDT -----  Subject: Message to Provider    QUESTIONS  Information for Provider? Patient needs script sent in to [facility] for   bone density study. Please contact pt as soon as possible.   ---------------------------------------------------------------------------  --------------  Andrez Kwon QLAURYNXA  [phone]; OK to leave message on voicemail  ---------------------------------------------------------------------------  --------------  SCRIPT ANSWERS  Relationship to Patient?  Self

## 2022-08-08 DIAGNOSIS — M81.0 AGE-RELATED OSTEOPOROSIS WITHOUT CURRENT PATHOLOGICAL FRACTURE: ICD-10-CM

## 2022-09-15 ENCOUNTER — OFFICE VISIT (OUTPATIENT)
Dept: PRIMARY CARE CLINIC | Age: 72
End: 2022-09-15
Payer: MEDICARE

## 2022-09-15 VITALS
SYSTOLIC BLOOD PRESSURE: 134 MMHG | WEIGHT: 153 LBS | HEIGHT: 60 IN | HEART RATE: 88 BPM | BODY MASS INDEX: 30.04 KG/M2 | OXYGEN SATURATION: 95 % | DIASTOLIC BLOOD PRESSURE: 78 MMHG | TEMPERATURE: 98.1 F

## 2022-09-15 DIAGNOSIS — J01.80 ACUTE NON-RECURRENT SINUSITIS OF OTHER SINUS: Primary | ICD-10-CM

## 2022-09-15 DIAGNOSIS — J20.8 ACUTE BRONCHITIS DUE TO OTHER SPECIFIED ORGANISMS: ICD-10-CM

## 2022-09-15 PROCEDURE — 99213 OFFICE O/P EST LOW 20 MIN: CPT | Performed by: FAMILY MEDICINE

## 2022-09-15 PROCEDURE — 1036F TOBACCO NON-USER: CPT | Performed by: FAMILY MEDICINE

## 2022-09-15 PROCEDURE — 3017F COLORECTAL CA SCREEN DOC REV: CPT | Performed by: FAMILY MEDICINE

## 2022-09-15 PROCEDURE — G8428 CUR MEDS NOT DOCUMENT: HCPCS | Performed by: FAMILY MEDICINE

## 2022-09-15 PROCEDURE — 96372 THER/PROPH/DIAG INJ SC/IM: CPT | Performed by: FAMILY MEDICINE

## 2022-09-15 PROCEDURE — G8417 CALC BMI ABV UP PARAM F/U: HCPCS | Performed by: FAMILY MEDICINE

## 2022-09-15 PROCEDURE — 1090F PRES/ABSN URINE INCON ASSESS: CPT | Performed by: FAMILY MEDICINE

## 2022-09-15 PROCEDURE — G8399 PT W/DXA RESULTS DOCUMENT: HCPCS | Performed by: FAMILY MEDICINE

## 2022-09-15 PROCEDURE — 1123F ACP DISCUSS/DSCN MKR DOCD: CPT | Performed by: FAMILY MEDICINE

## 2022-09-15 RX ORDER — CEFTRIAXONE 500 MG/1
500 INJECTION, POWDER, FOR SOLUTION INTRAMUSCULAR; INTRAVENOUS ONCE
Status: COMPLETED | OUTPATIENT
Start: 2022-09-15 | End: 2022-09-15

## 2022-09-15 RX ORDER — METHYLPREDNISOLONE ACETATE 40 MG/ML
40 INJECTION, SUSPENSION INTRA-ARTICULAR; INTRALESIONAL; INTRAMUSCULAR; SOFT TISSUE ONCE
Status: COMPLETED | OUTPATIENT
Start: 2022-09-15 | End: 2022-09-15

## 2022-09-15 RX ORDER — CEFDINIR 300 MG/1
300 CAPSULE ORAL 2 TIMES DAILY
Qty: 20 CAPSULE | Refills: 0 | Status: SHIPPED | OUTPATIENT
Start: 2022-09-15 | End: 2022-09-25

## 2022-09-15 RX ORDER — LIDOCAINE HYDROCHLORIDE 10 MG/ML
1 INJECTION, SOLUTION INFILTRATION; PERINEURAL ONCE
Status: COMPLETED | OUTPATIENT
Start: 2022-09-15 | End: 2022-09-15

## 2022-09-15 RX ORDER — PREDNISONE 10 MG/1
TABLET ORAL
Qty: 18 TABLET | Refills: 0 | Status: SHIPPED
Start: 2022-09-15 | End: 2022-10-07

## 2022-09-15 RX ADMIN — CEFTRIAXONE 500 MG: 500 INJECTION, POWDER, FOR SOLUTION INTRAMUSCULAR; INTRAVENOUS at 10:05

## 2022-09-15 RX ADMIN — METHYLPREDNISOLONE ACETATE 40 MG: 40 INJECTION, SUSPENSION INTRA-ARTICULAR; INTRALESIONAL; INTRAMUSCULAR; SOFT TISSUE at 10:07

## 2022-09-15 RX ADMIN — LIDOCAINE HYDROCHLORIDE 1 ML: 10 INJECTION, SOLUTION INFILTRATION; PERINEURAL at 10:06

## 2022-09-15 ASSESSMENT — ENCOUNTER SYMPTOMS
EYES NEGATIVE: 1
COUGH: 1
ALLERGIC/IMMUNOLOGIC NEGATIVE: 1
GASTROINTESTINAL NEGATIVE: 1
SINUS PRESSURE: 1

## 2022-09-15 ASSESSMENT — PATIENT HEALTH QUESTIONNAIRE - PHQ9
SUM OF ALL RESPONSES TO PHQ QUESTIONS 1-9: 0
1. LITTLE INTEREST OR PLEASURE IN DOING THINGS: 0
SUM OF ALL RESPONSES TO PHQ9 QUESTIONS 1 & 2: 0
SUM OF ALL RESPONSES TO PHQ QUESTIONS 1-9: 0
2. FEELING DOWN, DEPRESSED OR HOPELESS: 0

## 2022-09-15 NOTE — PROGRESS NOTES
9/15/22  Name: Chanel Pizarro    : 1950    Sex: female    Age: 74 y.o. Subjective:  Chief Complaint: Patient is here for sore throa t  sinus             5  d no tempchills    She did covid  twice   and neg  Cough    sl mucus      Review of Systems   Constitutional: Negative. HENT:  Positive for postnasal drip and sinus pressure. Eyes: Negative. Respiratory:  Positive for cough. Cardiovascular: Negative. Gastrointestinal: Negative. Endocrine: Negative. Genitourinary: Negative. Musculoskeletal: Negative. Skin: Negative. Allergic/Immunologic: Negative. Neurological: Negative. Hematological: Negative. Psychiatric/Behavioral: Negative. Current Outpatient Medications:     cefdinir (OMNICEF) 300 MG capsule, Take 1 capsule by mouth 2 times daily for 10 days, Disp: 20 capsule, Rfl: 0    predniSONE (DELTASONE) 10 MG tablet, ONE TID FOR THREE DAYS, ONE BID FOR THREE DAYS, ONE QD FOR THREE DAYS   FOOD, Disp: 18 tablet, Rfl: 0    fluticasone (FLONASE) 50 MCG/ACT nasal spray, 1 spray by Each Nostril route 2 times daily, Disp: 1 each, Rfl: 3    ammonium lactate (LAC-HYDRIN) 12 % lotion, Apply topically daily. , Disp: 222 mL, Rfl: 5    FLUoxetine (PROZAC) 10 MG capsule, Take 1 capsule by mouth daily, Disp: 90 capsule, Rfl: 3    levothyroxine (SYNTHROID) 100 MCG tablet, One three times a week, Disp: 50 tablet, Rfl: 12    levothyroxine (SYNTHROID) 88 MCG tablet, One four times a week, Disp: 80 tablet, Rfl: 12    metoprolol succinate (TOPROL XL) 25 MG extended release tablet, Take 1 tablet by mouth 2 times daily (Patient taking differently: Take 25 mg by mouth daily ), Disp: 180 tablet, Rfl: 3    Cranberry-Cholecalciferol 4200-500 MG-UNIT CAPS, Take by mouth, Disp: , Rfl:     calcium carbonate 600 MG TABS tablet, Take 1 tablet by mouth daily, Disp: , Rfl:     Cyanocobalamin (VITAMIN B-12) 1000 MCG SUBL, Place under the tongue, Disp: , Rfl:   Allergies   Allergen Reactions Azithromycin      Stomach pains    Doxycycline      Stomach pains    Zanaflex [Tizanidine]      Stomach pains     Social History     Socioeconomic History    Marital status:      Spouse name: Not on file    Number of children: Not on file    Years of education: Not on file    Highest education level: Not on file   Occupational History    Not on file   Tobacco Use    Smoking status: Never    Smokeless tobacco: Never   Vaping Use    Vaping Use: Never used   Substance and Sexual Activity    Alcohol use: No    Drug use: No    Sexual activity: Not on file   Other Topics Concern    Not on file   Social History Narrative        HYPOTHYROIDISM    [address]PRESSION [address]  [address] [facility] [address] but later found not to be cancer    Christian Silva  1950 Page #2    RIGHT BREAST CA WITH 13 POSITIVE LYMPH NODES  WITH BILATERAL MASTECTOMY AND    IMPLANTS. ---SEES DR ALBERT    Hysterectomy, left 1 ovary    right inguinal hernia repair    cataracts with IOL bilateral    FAMILY HISTORY OF COLON CA, DIABETES    DIET-CONTROLLED DIABETES    ELEVATED LIVER FUNCTION    HYPERLIPIDEMIA, ON STATINS IN THE PAST    LAST A1C 6.0    LAST CHOLESTEROL 231    CERV DISC OR DR ALBERT ---    ALLERGIES IN THE PAST. SAW DR. 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 Brent Mendosa, THE    DENTIST    COLONOSCOPY ---DR RIVER---DIFFICULT TO PASS--PT REFUSES ANY MORE    MRI -16 WITH CERVICAL MOD SPINAL STENOSIS---EVAL DR David Shaw--- PT CANCELLED    SHOT    [facility] for gi  -   gave probitoic and pepcid    USE TO WORK WITH [employer]    CT HEAD WITH ANGIOMA----17 PT STATES WAS THERE YRS AGO    MYOFACIAL PELVIC PAIN SYNDROME -17  APOSTOLIS----    RIGHT THIRD FINGER OR DR GONZALEZ 10-17    EVAL CARDIO WITH NEG ECHO 10-17 DR JOSE BAPTISTE    PREVAR 2017    EVAL DR CEJA-----[facility] ANAL-VAG FISTULA BUT MARCIAL DOUBTS- 1-19    NO UTI SINCE USING VAG COCUNUT OIL 1-19    egd  1-20  Dr Mitchell Anamaria  gasgritis    Low  back pain  owih  shots dr Larissa Gotti   6-21   ablation x two     Social Determinants of Health     Financial Resource Strain: Not on file   Food Insecurity: Not on file   Transportation Needs: Not on file   Physical Activity: Not on file   Stress: Not on file   Social Connections: Not on file   Intimate Partner Violence: Not on file   Housing Stability: Not on file      Past Medical History:   Diagnosis Date    Abdominal pain, other specified site 6/6/2012    Allergic urticaria     Cancer ([facility] [address]     1999 right breast cancer / treated with surgery and chemo    Depression 2000    Diet-controlled diabetes mellitus ([facility] [address]     Hyperlipidemia     DENIES    Hypothyroidism     Myofascial pain syndrome 2017    Pelvic - Dr Zarate Asa    Osteoarthritis cervical spine     cervical spine    Pancreatic cancer ([facility] [address] 2007    Partial Whipple Procedure - [facility]    Pancreatitis, acute 6/6/2012    Prolonged emergence from general anesthesia      Family History   Problem Relation Age of Onset    Coronary Art Dis Mother     Diabetes Mother     Colon Cancer Father     Diabetes Other     Colon Cancer Other     Atrial Fibrillation Brother     High Blood Pressure Brother     High Cholesterol Brother     High Cholesterol Brother     High Blood Pressure Brother       Past Surgical History:   Procedure Laterality Date    BREAST RECONSTRUCTION Bilateral 07/25/2019    CAPSULECTOMY OF LEFT BREAST WITH REMOVAL OF SALINE  IMPLANTS AND REPLACMENT WITH BILATERAL SILICONE IMPLANTS performed by Heaven De Santiago MD at [facility] 56 Bilateral 2012    [address]    [address] [facility]  2007    with Whelizabeth DAYS   FOOD    Acute bronchitis due to other specified organisms  -     methylPREDNISolone acetate (DEPO-MEDROL) injection 40 mg  -     cefTRIAXone (ROCEPHIN) injection 500 mg  -     lidocaine 1 % injection 1 mL  -     cefdinir (OMNICEF) 300 MG capsule; Take 1 capsule by mouth 2 times daily for 10 days  -     predniSONE (DELTASONE) 10 MG tablet; ONE TID FOR THREE DAYS, ONE BID FOR THREE DAYS, ONE QD FOR THREE DAYS   FOOD      Comments: med   not  great see       I educated the patient about all medications. Make sure they were correct and educated  on the risk associated with missing meds or taking them incorrectly. A list of medications is being sent home with patient today. I informed patient about the risk associated with noncompliance of medication and taking medications incorrectly. Appropriate follow-up with myself and all specialist.  Encourage family members to take active role in assisting with medications and medical care. If any confusion should develop to notify my office immediately to avoid risk of worsening medical condition    A great deal of time spent reviewing medications, diet, exercise, social issues. Also reviewing the chart before entering the room with patient and finishing charting after leaving patient's room. More than half of that time was spent face to face with the patient in counseling and coordinating care. Follow Up: Return if symptoms worsen or fail to improve, for Reg Appt, Meds. If worse go to ER. Not better in 24 hr see.      Seen by:  Geo Heard,

## 2022-09-21 ENCOUNTER — TELEPHONE (OUTPATIENT)
Dept: PRIMARY CARE CLINIC | Age: 72
End: 2022-09-21

## 2022-09-24 ENCOUNTER — OFFICE VISIT (OUTPATIENT)
Dept: FAMILY MEDICINE CLINIC | Age: 72
End: 2022-09-24
Payer: MEDICARE

## 2022-09-24 VITALS
DIASTOLIC BLOOD PRESSURE: 78 MMHG | HEIGHT: 60 IN | WEIGHT: 152 LBS | SYSTOLIC BLOOD PRESSURE: 135 MMHG | BODY MASS INDEX: 29.84 KG/M2 | TEMPERATURE: 96.5 F

## 2022-09-24 DIAGNOSIS — R05.9 COUGH: ICD-10-CM

## 2022-09-24 DIAGNOSIS — J01.80 ACUTE NON-RECURRENT SINUSITIS OF OTHER SINUS: Primary | ICD-10-CM

## 2022-09-24 LAB
Lab: NORMAL
PERFORMING INSTRUMENT: NORMAL
QC PASS/FAIL: NORMAL
SARS-COV-2, POC: NORMAL

## 2022-09-24 PROCEDURE — G8428 CUR MEDS NOT DOCUMENT: HCPCS | Performed by: FAMILY MEDICINE

## 2022-09-24 PROCEDURE — 99213 OFFICE O/P EST LOW 20 MIN: CPT | Performed by: FAMILY MEDICINE

## 2022-09-24 PROCEDURE — 1090F PRES/ABSN URINE INCON ASSESS: CPT | Performed by: FAMILY MEDICINE

## 2022-09-24 PROCEDURE — G8399 PT W/DXA RESULTS DOCUMENT: HCPCS | Performed by: FAMILY MEDICINE

## 2022-09-24 PROCEDURE — 3017F COLORECTAL CA SCREEN DOC REV: CPT | Performed by: FAMILY MEDICINE

## 2022-09-24 PROCEDURE — 1036F TOBACCO NON-USER: CPT | Performed by: FAMILY MEDICINE

## 2022-09-24 PROCEDURE — 1123F ACP DISCUSS/DSCN MKR DOCD: CPT | Performed by: FAMILY MEDICINE

## 2022-09-24 PROCEDURE — 87426 SARSCOV CORONAVIRUS AG IA: CPT | Performed by: FAMILY MEDICINE

## 2022-09-24 PROCEDURE — G8417 CALC BMI ABV UP PARAM F/U: HCPCS | Performed by: FAMILY MEDICINE

## 2022-09-24 RX ORDER — AZELASTINE 1 MG/ML
2 SPRAY, METERED NASAL 2 TIMES DAILY
Qty: 120 ML | Refills: 1 | Status: SHIPPED | OUTPATIENT
Start: 2022-09-24

## 2022-09-24 RX ORDER — METHYLPREDNISOLONE 4 MG/1
TABLET ORAL
Qty: 1 KIT | Refills: 0 | Status: SHIPPED
Start: 2022-09-24 | End: 2022-10-07

## 2022-09-24 ASSESSMENT — PATIENT HEALTH QUESTIONNAIRE - PHQ9
SUM OF ALL RESPONSES TO PHQ QUESTIONS 1-9: 0
SUM OF ALL RESPONSES TO PHQ9 QUESTIONS 1 & 2: 0
2. FEELING DOWN, DEPRESSED OR HOPELESS: 0
1. LITTLE INTEREST OR PLEASURE IN DOING THINGS: 0

## 2022-09-24 ASSESSMENT — ENCOUNTER SYMPTOMS
RESPIRATORY NEGATIVE: 1
NAUSEA: 1
ALLERGIC/IMMUNOLOGIC NEGATIVE: 1
SINUS PRESSURE: 1
EYES NEGATIVE: 1

## 2022-09-24 NOTE — PROGRESS NOTES
22  Name: Ant Mauro    : 1950    Sex: female    Age: 74 y.o. Subjective:  Chief Complaint: Patient is here for  sinus prssure  mucus  nasal c maryjo     Covd neg  again today     taking meds     no tmepc hisl no cp ros ob  She takes flaonse  bid      Review of Systems   Constitutional: Negative. HENT:  Positive for sinus pressure. Eyes: Negative. Respiratory: Negative. Cardiovascular: Negative. Gastrointestinal:  Positive for nausea. Endocrine: Negative. Genitourinary: Negative. Musculoskeletal: Negative. Skin: Negative. Allergic/Immunologic: Negative. Neurological: Negative. Hematological: Negative. Psychiatric/Behavioral: Negative. Current Outpatient Medications:     methylPREDNISolone (MEDROL DOSEPACK) 4 MG tablet, Take by mouth., Disp: 1 kit, Rfl: 0    azelastine (ASTELIN) 0.1 % nasal spray, 2 sprays by Nasal route 2 times daily Use in each nostril as directed, Disp: 120 mL, Rfl: 1    cefdinir (OMNICEF) 300 MG capsule, Take 1 capsule by mouth 2 times daily for 10 days, Disp: 20 capsule, Rfl: 0    predniSONE (DELTASONE) 10 MG tablet, ONE TID FOR THREE DAYS, ONE BID FOR THREE DAYS, ONE QD FOR THREE DAYS   FOOD, Disp: 18 tablet, Rfl: 0    fluticasone (FLONASE) 50 MCG/ACT nasal spray, 1 spray by Each Nostril route 2 times daily, Disp: 1 each, Rfl: 3    ammonium lactate (LAC-HYDRIN) 12 % lotion, Apply topically daily. , Disp: 222 mL, Rfl: 5    FLUoxetine (PROZAC) 10 MG capsule, Take 1 capsule by mouth daily, Disp: 90 capsule, Rfl: 3    levothyroxine (SYNTHROID) 100 MCG tablet, One three times a week, Disp: 50 tablet, Rfl: 12    levothyroxine (SYNTHROID) 88 MCG tablet, One four times a week, Disp: 80 tablet, Rfl: 12    metoprolol succinate (TOPROL XL) 25 MG extended release tablet, Take 1 tablet by mouth 2 times daily (Patient taking differently: Take 25 mg by mouth daily ), Disp: 180 tablet, Rfl: 3    Cranberry-Cholecalciferol 4200-500 MG-UNIT CAPS, Take by mouth, Disp: , Rfl:     calcium carbonate 600 MG TABS tablet, Take 1 tablet by mouth daily, Disp: , Rfl:     Cyanocobalamin (VITAMIN B-12) 1000 MCG SUBL, Place under the tongue, Disp: , Rfl:   Allergies   Allergen Reactions    Azithromycin      Stomach pains    Doxycycline      Stomach pains    Zanaflex [Tizanidine]      Stomach pains     Social History     Socioeconomic History    Marital status:      Spouse name: Not on file    Number of children: Not on file    Years of education: Not on file    Highest education level: Not on file   Occupational History    Not on file   Tobacco Use    Smoking status: Never    Smokeless tobacco: Never   Vaping Use    Vaping Use: Never used   Substance and Sexual Activity    Alcohol use: No    Drug use: No    Sexual activity: Not on file   Other Topics Concern    Not on file   Social History Narrative        HYPOTHYROIDISM    DEPRESSION SINCE     [address] 70  [address] [facility] but later found not to be cancer    Belen Bundy  1950 Page #2    RIGHT BREAST CA WITH 13 POSITIVE LYMPH NODES  WITH BILATERAL MASTECTOMY AND    IMPLANTS. ---SEES DR ALBERT    Hysterectomy, left 1 ovary    right inguinal hernia repair    cataracts with IOL bilateral    FAMILY HISTORY OF COLON CA, DIABETES    DIET-CONTROLLED DIABETES    ELEVATED LIVER FUNCTION    HYPERLIPIDEMIA, ON STATINS IN THE PAST    LAST A1C 6.0    LAST CHOLESTEROL 231    CERV DISC OR DR ALBERT ---    ALLERGIES IN THE PAST. SAW DR. 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 Lottie Frankel, THE    DENTIST    COLONOSCOPY ---DR RIVER---DIFFICULT TO PASS--PT REFUSES ANY MORE    MRI - WITH CERVICAL MOD SPINAL STENOSIS---TEJASAL DR Anna Li--- PT CANCELLED    Anna Singh Bi for gi  5-21   gave probitoic and pepcid    USE TO WORK WITH [employer]    CT HEAD WITH ANGIOMA---4-17 PT STATES WAS THERE YRS AGO    MYOFACIAL PELVIC PAIN SYNDROME 7-17 DR GARCIA----    RIGHT THIRD FINGER OR DR GONZALEZ 10-17    EVAL CARDIO WITH NEG ECHO 10-17 DR JOSE BAPTISTE    PREVAR 2017    EVAL DR CEJA----- [facility] THOUGHT ANAL-VAG FISTULA BUT STYNE DOUBTS- 1-19    NO UTI SINCE USING VAG COCUNUT OIL 1-19    egd  1-20  Dr Ida Cornell  gasgritis    Low  back pain  owih  shots dr Shabana Romero   6-21   ablation x two     Social Determinants of Health     Financial Resource Strain: Not on file   Food Insecurity: Not on file   Transportation Needs: Not on file   Physical Activity: Not on file   Stress: Not on file   Social Connections: Not on file   Intimate Partner Violence: Not on file   Housing Stability: Not on file      Past Medical History:   Diagnosis Date    Abdominal pain, other specified site 6/6/2012    Allergic urticaria     Cancer ([facility] [address]     1999 right breast cancer / treated with surgery and chemo    Depression 2000    Diet-controlled diabetes mellitus ([facility] [address]     Hyperlipidemia     DENIES    Hypothyroidism     Myofascial pain syndrome 2017    Pelvic - Dr Hu Veras    Osteoarthritis cervical spine     cervical spine    Pancreatic cancer ([facility] [address] 2007    Partial Whipple Procedure - [facility]    Pancreatitis, acute 6/6/2012    Prolonged emergence from general anesthesia      Family History   Problem Relation Age of Onset    Coronary Art Dis Mother     Diabetes Mother     Colon Cancer Father     Diabetes Other     Colon Cancer Other     Atrial Fibrillation Brother     High Blood Pressure Brother     High Cholesterol Brother     High Cholesterol Brother     High Blood Pressure Brother       Past Surgical History:   Procedure Laterality Date    BREAST RECONSTRUCTION Bilateral 07/25/2019    CAPSULECTOMY OF LEFT BREAST WITH REMOVAL OF SALINE  IMPLANTS AND REPLACMENT WITH BILATERAL SILICONE IMPLANTS performed by Rikki Church [facility] Bilateral 2012    [facility]  2007    with Nader Jensen  2015    Dr. Sherren Bills    to repair muscle     HERNIA REPAIR Right years ago    inguinal    HYSTERECTOMY (CERVIX STATUS UNKNOWN)  1978    MASTECTOMY, BILATERAL Bilateral 1999    radical mastectomy r / mastectomy at left    OTHER SURGICAL HISTORY Right 10/18/2017    EXCISION GANGLION CYST RIGHT MIDDLE FINGER    PANCREATECTOMY  2007    Whipple 1/3 taken       Vitals:    09/24/22 1043   BP: 135/78   Temp: (!) 96.5 °F (35.8 °C)   Weight: 152 lb (68.9 kg)   Height: 5' (1.524 m)       Objective:    Physical Exam  Vitals reviewed. Constitutional:       Appearance: Normal appearance. She is well-developed. HENT:      Head: Normocephalic. Right Ear: Tympanic membrane normal.      Left Ear: Tympanic membrane normal.      Nose: Nose normal.      Mouth/Throat:      Mouth: Mucous membranes are moist.   Eyes:      Pupils: Pupils are equal, round, and reactive to light. Cardiovascular:      Rate and Rhythm: Normal rate and regular rhythm. Pulmonary:      Effort: Pulmonary effort is normal.      Breath sounds: Normal breath sounds. Abdominal:      General: Bowel sounds are normal.      Palpations: Abdomen is soft. Musculoskeletal:         General: Normal range of motion. Cervical back: Normal range of motion. Skin:     General: Skin is warm. Neurological:      Mental Status: She is alert and oriented to person, place, and time. Psychiatric:         Behavior: Behavior normal.       Diagnoses and all orders for this visit:    Acute non-recurrent sinusitis of other sinus  -     methylPREDNISolone (MEDROL DOSEPACK) 4 MG tablet;  Take by mouth.  -     azelastine (ASTELIN) 0.1 % nasal spray; 2 sprays by Nasal route 2 times daily Use in each nostril as directed    Cough  -     POCT COVID-19, Antigen      Comments: med  n chg not  great see      I educated the patient about all medications. Make sure they were correct and educated  on the risk associated with missing meds or taking them incorrectly. A list of medications is being sent home with patient today. I informed patient about the risk associated with noncompliance of medication and taking medications incorrectly. Appropriate follow-up with myself and all specialist.  Encourage family members to take active role in assisting with medications and medical care. If any confusion should develop to notify my office immediately to avoid risk of worsening medical condition    A great deal of time spent reviewing medications, diet, exercise, social issues. Also reviewing the chart before entering the room with patient and finishing charting after leaving patient's room. More than half of that time was spent face to face with the patient in counseling and coordinating care. Follow Up: Return if symptoms worsen or fail to improve, for Reg Appt, Meds. If worse go to ER. Not better in 24 hr see.      Seen by:  Neal Allred, DO

## 2022-10-07 ENCOUNTER — OFFICE VISIT (OUTPATIENT)
Dept: CARDIOLOGY CLINIC | Age: 72
End: 2022-10-07
Payer: MEDICARE

## 2022-10-07 VITALS
HEART RATE: 73 BPM | RESPIRATION RATE: 16 BRPM | WEIGHT: 151.9 LBS | SYSTOLIC BLOOD PRESSURE: 132 MMHG | DIASTOLIC BLOOD PRESSURE: 88 MMHG | BODY MASS INDEX: 29.82 KG/M2 | HEIGHT: 60 IN

## 2022-10-07 DIAGNOSIS — I10 ESSENTIAL HYPERTENSION: Primary | ICD-10-CM

## 2022-10-07 PROCEDURE — 3017F COLORECTAL CA SCREEN DOC REV: CPT | Performed by: INTERNAL MEDICINE

## 2022-10-07 PROCEDURE — G8399 PT W/DXA RESULTS DOCUMENT: HCPCS | Performed by: INTERNAL MEDICINE

## 2022-10-07 PROCEDURE — 1123F ACP DISCUSS/DSCN MKR DOCD: CPT | Performed by: INTERNAL MEDICINE

## 2022-10-07 PROCEDURE — G8484 FLU IMMUNIZE NO ADMIN: HCPCS | Performed by: INTERNAL MEDICINE

## 2022-10-07 PROCEDURE — 93000 ELECTROCARDIOGRAM COMPLETE: CPT | Performed by: INTERNAL MEDICINE

## 2022-10-07 PROCEDURE — G8417 CALC BMI ABV UP PARAM F/U: HCPCS | Performed by: INTERNAL MEDICINE

## 2022-10-07 PROCEDURE — 1036F TOBACCO NON-USER: CPT | Performed by: INTERNAL MEDICINE

## 2022-10-07 PROCEDURE — 99214 OFFICE O/P EST MOD 30 MIN: CPT | Performed by: INTERNAL MEDICINE

## 2022-10-07 PROCEDURE — G8427 DOCREV CUR MEDS BY ELIG CLIN: HCPCS | Performed by: INTERNAL MEDICINE

## 2022-10-07 PROCEDURE — 1090F PRES/ABSN URINE INCON ASSESS: CPT | Performed by: INTERNAL MEDICINE

## 2022-10-07 NOTE — PROGRESS NOTES
CHIEF COMPLAINT: Palpitations    HISTORY OF PRESENT ILLNESS: Patient is a 74 y.o. female seen at the request of Nettie Monet DO. Patient presents in follow up. Nadeem Rico recently with viral syndrome. Palpitations stable. No CP or SOB. No other issues.     Past Medical History:   Diagnosis Date    Abdominal pain, other specified site 6/6/2012    Allergic urticaria     Cancer ([address]     1999 right breast cancer / treated with surgery and chemo    Depression 2000    Diet-controlled diabetes mellitus ([address]     Hyperlipidemia     DENIES    Hypothyroidism     Myofascial pain syndrome 2017    Pelvic - Dr Jose Alejandro Hammonds    Osteoarthritis cervical spine     cervical spine    Pancreatic cancer [facility]) 2007    Partial Whipple Procedure - [facility]    Pancreatitis, acute 6/6/2012    Prolonged emergence from general anesthesia        Patient Active Problem List   Diagnosis    Acquired hypothyroidism    Anxiety    Cervical spinal stenosis    Ganglion of flexor tendon sheath of right middle finger    History of breast cancer    Essential hypertension    Chronic gastritis without bleeding    Primary osteoarthritis of left knee    Gastroesophageal reflux disease without esophagitis    Spondylosis of lumbar region without myelopathy or radiculopathy    Pre-diabetes    Palpitations    Dermatitis    Chronic otitis externa of right ear    Mixed hyperlipidemia    Lumbar herniated disc    Primary osteoarthritis involving multiple joints    Diet-controlled diabetes mellitus (HCC)    Hyperkalemia    Hammer toes of both feet    Corns and callus       Allergies   Allergen Reactions    Azithromycin      Stomach pains    Doxycycline      Stomach pains    Zanaflex [Tizanidine]      Stomach pains       Current Outpatient Medications   Medication Sig Dispense Refill    CRANBERRY EXTRACT PO Take 500 mg by mouth      azelastine (ASTELIN) 0.1 % nasal spray 2 sprays by Nasal route 2 times daily Use in each nostril as directed 120 mL 1    fluticasone (FLONASE) 50 MCG/ACT nasal spray 1 spray by Each Nostril route 2 times daily 1 each 3    FLUoxetine (PROZAC) 10 MG capsule Take 1 capsule by mouth daily 90 capsule 3    levothyroxine (SYNTHROID) 100 MCG tablet One three times a week 50 tablet 12    levothyroxine (SYNTHROID) 88 MCG tablet One four times a week 80 tablet 12    metoprolol succinate (TOPROL XL) 25 MG extended release tablet Take 1 tablet by mouth 2 times daily (Patient taking differently: Take 25 mg by mouth daily) 180 tablet 3    Cranberry-Cholecalciferol 4200-500 MG-UNIT CAPS Take by mouth      calcium carbonate 600 MG TABS tablet Take 1 tablet by mouth daily      Cyanocobalamin (VITAMIN B-12) 1000 MCG SUBL Place under the tongue       No current facility-administered medications for this visit. Social History     Socioeconomic History    Marital status:      Spouse name: Not on file    Number of children: Not on file    Years of education: Not on file    Highest education level: Not on file   Occupational History    Not on file   Tobacco Use    Smoking status: Never    Smokeless tobacco: Never   Vaping Use    Vaping Use: Never used   Substance and Sexual Activity    Alcohol use: No    Drug use: No    Sexual activity: Not on file   Other Topics Concern    Not on file   Social History Narrative        HYPOTHYROIDISM    DEPRESSION [address] [facility] [address] but later found not to be cancer    Bruce Hernandez  1950 Page #2    RIGHT BREAST CA WITH 13 POSITIVE LYMPH NODES 1998 WITH BILATERAL MASTECTOMY AND    IMPLANTS. ---SEES DR ALBERT    Hysterectomy, left 1 ovary    right inguinal hernia repair    cataracts with IOL bilateral    FAMILY HISTORY OF COLON CA, DIABETES    DIET-CONTROLLED DIABETES    ELEVATED LIVER FUNCTION    HYPERLIPIDEMIA, ON STATINS IN THE PAST    LAST A1C 6.0    LAST CHOLESTEROL [facility] ---1993    ALLERGIES IN THE PAST. SAW DR. 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, THE    DENTIST    COLONOSCOPY 2015---DR RIVER---DIFFICULT TO PASS--PT REFUSES ANY MORE    MRI 11-16 WITH CERVICAL MOD SPINAL STENOSIS---EVAL DR John Desai--- PT CANCELLED    John Evangelista for gi  5-21   gave probitoic and pepcid    USE TO WORK WITH [employer]    CT HEAD WITH ANGIOMA---4-17 PT STATES WAS THERE YRS AGO    MYOFACIAL PELVIC PAIN SYNDROME 7-17 DR GARCIA----    RIGHT THIRD FINGER OR DR GONZALEZ 10-17    EVAL CARDIO WITH NEG ECHO 10-17 DR JOSE BAPTISTE    PREVAR 2017    EVAL DR CEJA----- [facility] THOUGHT ANAL-VAG FISTULA BUT MARCIAL DOUBTS- 1-19    NO UTI SINCE USING VAG COCUNUT OIL 1-19    egd  1-20  Dr Samuel Ramirez  gasgritis    Low  back pain  owih  shots dr Zackary Bocanegra   6-21   ablation x two     Social Determinants of Health     Financial Resource Strain: Not on file   Food Insecurity: Not on file   Transportation Needs: Not on file   Physical Activity: Not on file   Stress: Not on file   Social Connections: Not on file   Intimate Partner Violence: Not on file   Housing Stability: Not on file       Family History   Problem Relation Age of Onset    Coronary Art Dis Mother     Diabetes Mother     Colon Cancer Father     Diabetes Other     Colon Cancer Other     Atrial Fibrillation Brother     High Blood Pressure Brother     High Cholesterol Brother     High Cholesterol Brother     High Blood Pressure Brother        Review of Systems:  Heart: as above   Lungs: as above   Eyes: denies changes in vision or discharge. Ears: denies changes in hearing or pain. Nose: denies epistaxis or masses   Throat: denies sore throat or trouble swallowing. Neuro: denies numbness, tingling, tremors.    Skin: denies rashes or itching. : denies hematuria, dysuria   GI: denies vomiting, diarrhea   Psych: denies mood changed, anxiety, depression. All other systems negative. Physical Exam   /88   Pulse 73   Resp 16   Ht 5' (1.524 m)   Wt 151 lb 14.4 oz (68.9 kg)   BMI 29.67 kg/m²   Constitutional: Oriented to person, place, and time. Well-developed and well-nourished. No distress. Head: Normocephalic and atraumatic. Eyes: EOM are normal. Pupils are equal, round, and reactive to light. Neck: Normal range of motion. Neck supple. No hepatojugular reflux and no JVD present. Carotid bruit is not present. No tracheal deviation present. No thyromegaly present. Cardiovascular: Normal rate, regular rhythm, normal heart sounds and intact distal pulses. Exam reveals no gallop and no friction rub. No murmur heard. Pulmonary/Chest: Effort normal and breath sounds normal. No respiratory distress. No wheezes. No rales. No tenderness. Abdominal: Soft. Bowel sounds are normal. No distension and no mass. No tenderness. No rebound and no guarding. Musculoskeletal: Normal range of motion. No edema and no tenderness. Lymphadenopathy:   No cervical adenopathy. No groin adenopathy. Neurological: Alert and oriented to person, place, and time. Skin: Skin is warm and dry. No rash noted. Not diaphoretic. No erythema. Psychiatric: Normal mood and affect. Behavior is normal.     EKG:  normal sinus rhythm, nonspecific ST and T waves changes. Echo Summary 9/18/17:   Left ventricular internal dimensions, wall thickness, regional wall motion, and systolic function appear to be normal.   Ejection fraction is visually estimated at 60%. There is Doppler evidence for stage I diastolic dysfunction. No obvious abnormality of valve function was seen. Echo Summary 6/14/2022:   Limited study. Ejection fraction is visually estimated at 60%. No regional wall motion abnormalities seen.    Normal right ventricle structure and function. Physiologic and/or trace mitral regurgitation is present. No evidence for hemodynamically significant pericardial effusion. ASSESSMENT AND PLAN:  Patient Active Problem List   Diagnosis    Acquired hypothyroidism    Anxiety    Cervical spinal stenosis    Ganglion of flexor tendon sheath of right middle finger    History of breast cancer    Essential hypertension    Chronic gastritis without bleeding    Primary osteoarthritis of left knee    Gastroesophageal reflux disease without esophagitis    Spondylosis of lumbar region without myelopathy or radiculopathy    Pre-diabetes    Palpitations    Dermatitis    Chronic otitis externa of right ear    Mixed hyperlipidemia    Lumbar herniated disc    Primary osteoarthritis involving multiple joints    Diet-controlled diabetes mellitus (HCC)    Hyperkalemia    Hammer toes of both feet    Corns and callus     1. Palpitations:    Echo normal 9/17. Holter. BB/mag. 2. Sinusitis/Viral syndrome: To see ENT. Nicholas Patel D.O.   Cardiologist  Cardiology, [id number]

## 2022-10-12 ENCOUNTER — OFFICE VISIT (OUTPATIENT)
Dept: FAMILY MEDICINE CLINIC | Age: 72
End: 2022-10-12
Payer: MEDICARE

## 2022-10-12 VITALS
WEIGHT: 151 LBS | OXYGEN SATURATION: 97 % | TEMPERATURE: 98.6 F | HEIGHT: 60 IN | BODY MASS INDEX: 29.64 KG/M2 | DIASTOLIC BLOOD PRESSURE: 78 MMHG | HEART RATE: 84 BPM | SYSTOLIC BLOOD PRESSURE: 132 MMHG

## 2022-10-12 DIAGNOSIS — R09.82 POSTNASAL DRIP: ICD-10-CM

## 2022-10-12 DIAGNOSIS — H61.892 IRRITATION OF LEFT EXTERNAL AUDITORY CANAL: ICD-10-CM

## 2022-10-12 DIAGNOSIS — J01.90 ACUTE NON-RECURRENT SINUSITIS, UNSPECIFIED LOCATION: ICD-10-CM

## 2022-10-12 DIAGNOSIS — R09.81 NASAL CONGESTION: Primary | ICD-10-CM

## 2022-10-12 LAB
INFLUENZA A ANTIBODY: NORMAL
INFLUENZA B ANTIBODY: NORMAL
Lab: NORMAL
PERFORMING INSTRUMENT: NORMAL
QC PASS/FAIL: NORMAL
SARS-COV-2, POC: NORMAL

## 2022-10-12 PROCEDURE — 1123F ACP DISCUSS/DSCN MKR DOCD: CPT | Performed by: PHYSICIAN ASSISTANT

## 2022-10-12 PROCEDURE — G8484 FLU IMMUNIZE NO ADMIN: HCPCS | Performed by: PHYSICIAN ASSISTANT

## 2022-10-12 PROCEDURE — G8417 CALC BMI ABV UP PARAM F/U: HCPCS | Performed by: PHYSICIAN ASSISTANT

## 2022-10-12 PROCEDURE — 3017F COLORECTAL CA SCREEN DOC REV: CPT | Performed by: PHYSICIAN ASSISTANT

## 2022-10-12 PROCEDURE — G8427 DOCREV CUR MEDS BY ELIG CLIN: HCPCS | Performed by: PHYSICIAN ASSISTANT

## 2022-10-12 PROCEDURE — 87426 SARSCOV CORONAVIRUS AG IA: CPT | Performed by: PHYSICIAN ASSISTANT

## 2022-10-12 PROCEDURE — 1090F PRES/ABSN URINE INCON ASSESS: CPT | Performed by: PHYSICIAN ASSISTANT

## 2022-10-12 PROCEDURE — 1036F TOBACCO NON-USER: CPT | Performed by: PHYSICIAN ASSISTANT

## 2022-10-12 PROCEDURE — 87804 INFLUENZA ASSAY W/OPTIC: CPT | Performed by: PHYSICIAN ASSISTANT

## 2022-10-12 PROCEDURE — G8399 PT W/DXA RESULTS DOCUMENT: HCPCS | Performed by: PHYSICIAN ASSISTANT

## 2022-10-12 PROCEDURE — 99213 OFFICE O/P EST LOW 20 MIN: CPT | Performed by: PHYSICIAN ASSISTANT

## 2022-10-12 RX ORDER — AMOXICILLIN AND CLAVULANATE POTASSIUM 875; 125 MG/1; MG/1
1 TABLET, FILM COATED ORAL 2 TIMES DAILY
Qty: 20 TABLET | Refills: 0 | Status: SHIPPED | OUTPATIENT
Start: 2022-10-12 | End: 2022-10-22

## 2022-10-12 RX ORDER — CIPROFLOXACIN AND DEXAMETHASONE 3; 1 MG/ML; MG/ML
4 SUSPENSION/ DROPS AURICULAR (OTIC) 2 TIMES DAILY
Qty: 7.5 ML | Refills: 0 | Status: SHIPPED | OUTPATIENT
Start: 2022-10-12 | End: 2022-10-19

## 2022-10-12 NOTE — PROGRESS NOTES
10/12/22  Loreta Krause : 1950 Sex: female  Age 74 y.o. Subjective:  Chief Complaint   Patient presents with    Drainage     Neg covid, saw pcp, given meds, no better    Congestion         HPI:   Loreta Krause , 74 y.o. female presents to express care for evaluation of sinus congestion, drainage    HPI  69-year-old female presents to express care for evaluation of sinus congestion, drainage. The patient's had the symptoms ongoing for quite a while. The patient has dated back to  with these symptoms. Saw PCP on the  and the . The patient has been given a injection of steroid, injection of antibiotic, oral antibiotics, oral prednisone and oral Medrol Dosepak. Patient followed up with ENT and did a scope and evaluation that it was related to reflux and told her to use Prilosec. The patient has been blowing out quite a bit of green mucus. The patient did do a COVID test at home that was negative. She is not currently on any antibiotics. She is not on any steroids. She was switched back to Flonase by the ENT and did not recommend using Astelin nasal spray      ROS:   Unless otherwise stated in this report the patient's positive and negative responses for review of systems for constitutional, eyes, ENT, cardiovascular, respiratory, gastrointestinal, neurological, , musculoskeletal, and integument systems and related systems to the presenting problem are either stated in the history of present illness or were not pertinent or were negative for the symptoms and/or complaints related to the presenting medical problem. Positives and pertinent negatives as per HPI. All others reviewed and are negative.       PMH:     Past Medical History:   Diagnosis Date    Abdominal pain, other specified site 2012    Allergic urticaria     Cancer ([facility] [address]      right breast cancer / treated with surgery and chemo    Depression 2000    Diet-controlled diabetes mellitus ([facility] [address] Hyperlipidemia     DENIES    Hypothyroidism     Myofascial pain syndrome 2017    Pelvic - Dr Justin Shultz    Osteoarthritis cervical spine     cervical spine    Pancreatic cancer [facility]) 2007    Partial Whipple Procedure - [facility]    Pancreatitis, acute 6/6/2012    Prolonged emergence from general anesthesia        Past Surgical History:   Procedure Laterality Date    BREAST RECONSTRUCTION Bilateral 07/25/2019    CAPSULECTOMY OF LEFT BREAST WITH REMOVAL OF SALINE  IMPLANTS AND REPLACMENT WITH BILATERAL SILICONE IMPLANTS performed by Riri Teran MD at [facility] Bilateral 2012    [address]    [address]    CHOLECYSTECTOMY  2007    with Heather Goel  2015    Dr. Gabriela Desai    to repair muscle     HERNIA REPAIR Right years ago    inguinal    HYSTERECTOMY (CERVIX STATUS UNKNOWN)  1978    MASTECTOMY, BILATERAL Bilateral 1999    radical mastectomy r / mastectomy at left    OTHER SURGICAL HISTORY Right 10/18/2017    EXCISION GANGLION CYST RIGHT MIDDLE FINGER    PANCREATECTOMY  2007    Whipple 1/3 taken        Family History   Problem Relation Age of Onset    Coronary Art Dis Mother     Diabetes Mother     Colon Cancer Father     Diabetes Other     Colon Cancer Other     Atrial Fibrillation Brother     High Blood Pressure Brother     High Cholesterol Brother     High Cholesterol Brother     High Blood Pressure Brother        Medications:     Current Outpatient Medications:     amoxicillin-clavulanate (AUGMENTIN) 875-125 MG per tablet, Take 1 tablet by mouth 2 times daily for 10 days, Disp: 20 tablet, Rfl: 0    ciprofloxacin-dexamethasone (CIPRODEX) 0.3-0.1 % otic suspension, Place 4 drops into the left ear 2 times daily for 7 days, Disp: 7.5 mL, Rfl: 0    CRANBERRY EXTRACT PO, Take 500 mg by mouth, Disp: , Rfl:     azelastine (ASTELIN) 0.1 % nasal spray, 2 sprays by Nasal route 2 times daily Use in each nostril as directed, Disp: 120 mL, Rfl: 1    fluticasone (FLONASE) 50 MCG/ACT nasal spray, 1 spray by Each Nostril route 2 times daily, Disp: 1 each, Rfl: 3    FLUoxetine (PROZAC) 10 MG capsule, Take 1 capsule by mouth daily, Disp: 90 capsule, Rfl: 3    levothyroxine (SYNTHROID) 100 MCG tablet, One three times a week, Disp: 50 tablet, Rfl: 12    levothyroxine (SYNTHROID) 88 MCG tablet, One four times a week, Disp: 80 tablet, Rfl: 12    metoprolol succinate (TOPROL XL) 25 MG extended release tablet, Take 1 tablet by mouth 2 times daily (Patient taking differently: Take 25 mg by mouth daily), Disp: 180 tablet, Rfl: 3    Cranberry-Cholecalciferol 4200-500 MG-UNIT CAPS, Take by mouth, Disp: , Rfl:     calcium carbonate 600 MG TABS tablet, Take 1 tablet by mouth daily, Disp: , Rfl:     Cyanocobalamin (VITAMIN B-12) 1000 MCG SUBL, Place under the tongue, Disp: , Rfl:     Allergies: Allergies   Allergen Reactions    Azithromycin      Stomach pains    Doxycycline      Stomach pains    Zanaflex [Tizanidine]      Stomach pains       Social History:     Social History     Tobacco Use    Smoking status: Never    Smokeless tobacco: Never   Vaping Use    Vaping Use: Never used   Substance Use Topics    Alcohol use: No    Drug use: No       Patient lives at home. Physical Exam:     Vitals:    10/12/22 1231   BP: 132/78   Pulse: 84   Temp: 98.6 °F (37 °C)   SpO2: 97%   Weight: 151 lb (68.5 kg)   Height: 5' (1.524 m)       Exam:  Physical Exam  Nurse's notes and vital signs reviewed. The patient is not hypoxic. ? General: Alert, no acute distress, patient resting comfortably Patient is not toxic or lethargic. Skin: Warm, intact, no pallor noted. There is no evidence of rash at this time.   Head: Normocephalic, atraumatic  Eye: Normal conjunctiva  Ears, Nose, Throat: Hearing aids were removed and we are able to visualize both of the tympanic membranes that are clear, the left external canal does have some blood noted in the inferior canal she just had wax removed, there is no evidence of wax noted in either of the canals. No drainage or discharge noted. No pre- or post-auricular tenderness, erythema, or swelling noted. Nasal congestion, rhinorrhea, no epistaxis  Posterior oropharynx shows erythema but no evidence of tonsillar hypertrophy, or exudate. the uvula is midline. No trismus or drooling is noted. Moist mucous membranes. Neck: No anterior/posterior lymphadenopathy noted. No erythema, no masses, no fluctuance or induration noted. No meningeal signs. Cardiovascular: Regular Rate and Rhythm  Respiratory: No acute distress, no rhonchi, wheezing or crackles noted. No stridor or retractions are noted. Neurological: A&O x4, normal speech  Psychiatric: Cooperative       Testing:     Results for orders placed or performed in visit on 10/12/22   POCT COVID-19, Antigen   Result Value Ref Range    SARS-COV-2, POC Not-Detected Not Detected    Lot Number [id number]     QC Pass/Fail PASS     Performing Instrument BD Veritor    POCT Influenza A/B   Result Value Ref Range    Influenza A Ab NEG     Influenza B Ab NEG            Medical Decision Making:     Vital signs reviewed    Past medical history reviewed. Allergies reviewed. Medications reviewed. Patient on arrival does not appear to be in any apparent distress or discomfort. The patient has been seen and evaluated. The patient does not appear to be toxic or lethargic. COVID test negative    Influenza negative    Patient will be treated with Augmentin. The patient will be given Ciprodex for the left external ear so there is no evidence of a otitis externa. Patient was comfortable with this plan. We will hold off on steroids. The patient was educated on the proper dosage of motrin and tylenol and the appropriate intervals of each. The patient is to increase fluid intake over the next several days. The patient is to use OTC decongestant as needed.      The patient is to return to express care or go directly to the emergency department should any of the signs or symptoms worsen. The patient is to followup with primary care physician in 2-3 days for repeat evaluation. The patient has no other questions or concerns at this time the patient will be discharged home. Clinical Impression:   Renita Thapa was seen today for drainage and congestion. Diagnoses and all orders for this visit:    Nasal congestion  -     POCT COVID-19, Antigen  -     POCT Influenza A/B    Acute non-recurrent sinusitis, unspecified location    Postnasal drip    Irritation of left external auditory canal    Other orders  -     amoxicillin-clavulanate (AUGMENTIN) 875-125 MG per tablet; Take 1 tablet by mouth 2 times daily for 10 days  -     ciprofloxacin-dexamethasone (CIPRODEX) 0.3-0.1 % otic suspension; Place 4 drops into the left ear 2 times daily for 7 days      The patient is to call for any concerns or return if any of the signs or symptoms worsen. The patient is to follow-up with PCP in the next 2-3 days for repeat evaluation repeat assessment or go directly to the emergency department.      SIGNATURE: Sabina Babinski III, PA-C

## 2022-10-13 ENCOUNTER — HOSPITAL ENCOUNTER (OUTPATIENT)
Dept: CT IMAGING | Age: 72
Discharge: HOME | End: 2022-10-15
Payer: MEDICARE

## 2022-10-13 DIAGNOSIS — J32.9 CHRONIC SINUSITIS, UNSPECIFIED LOCATION: ICD-10-CM

## 2022-10-13 PROCEDURE — 70486 CT MAXILLOFACIAL W/O DYE: CPT

## 2022-10-13 RX ORDER — FLUTICASONE PROPIONATE 50 MCG
1 SPRAY, SUSPENSION (ML) NASAL 2 TIMES DAILY
Qty: 1 EACH | Refills: 3 | Status: SHIPPED | OUTPATIENT
Start: 2022-10-13

## 2022-10-19 ENCOUNTER — TELEPHONE (OUTPATIENT)
Dept: PRIMARY CARE CLINIC | Age: 72
End: 2022-10-19

## 2022-10-19 DIAGNOSIS — J32.2 CHRONIC ETHMOIDAL SINUSITIS: Primary | ICD-10-CM

## 2022-10-19 NOTE — TELEPHONE ENCOUNTER
Let patient know that the ethmoid sinus which is left of the nose does show some fluid in it. I think it would be best to see an ear nose and throat doctor. I put a referral in to a different one who is with [facility]. I like him a great deal but will take much longer to get in there. No rush.   If she has significant pain over that area see me

## 2022-10-19 NOTE — TELEPHONE ENCOUNTER
The pt had a CT sinus done at [facility] 10/13 for Dr Alexander Dewitt, she is calling to see is you can look it over and advise her instead of Dr Alexander Dewitt because she didn't like him and has no intentions of going back

## 2022-11-09 ENCOUNTER — OFFICE VISIT (OUTPATIENT)
Dept: ENT CLINIC | Age: 72
End: 2022-11-09
Payer: MEDICARE

## 2022-11-09 VITALS
DIASTOLIC BLOOD PRESSURE: 92 MMHG | BODY MASS INDEX: 30.04 KG/M2 | WEIGHT: 153 LBS | HEART RATE: 67 BPM | SYSTOLIC BLOOD PRESSURE: 130 MMHG | HEIGHT: 60 IN

## 2022-11-09 DIAGNOSIS — R09.81 NASAL CONGESTION: ICD-10-CM

## 2022-11-09 DIAGNOSIS — J30.9 ALLERGIC RHINITIS, UNSPECIFIED SEASONALITY, UNSPECIFIED TRIGGER: Primary | ICD-10-CM

## 2022-11-09 DIAGNOSIS — R09.82 POST-NASAL DRAINAGE: ICD-10-CM

## 2022-11-09 DIAGNOSIS — J34.2 DNS (DEVIATED NASAL SEPTUM): ICD-10-CM

## 2022-11-09 DIAGNOSIS — J32.2 CHRONIC ETHMOIDAL SINUSITIS: ICD-10-CM

## 2022-11-09 PROCEDURE — 3078F DIAST BP <80 MM HG: CPT | Performed by: NURSE PRACTITIONER

## 2022-11-09 PROCEDURE — 3017F COLORECTAL CA SCREEN DOC REV: CPT | Performed by: NURSE PRACTITIONER

## 2022-11-09 PROCEDURE — G8484 FLU IMMUNIZE NO ADMIN: HCPCS | Performed by: NURSE PRACTITIONER

## 2022-11-09 PROCEDURE — G8417 CALC BMI ABV UP PARAM F/U: HCPCS | Performed by: NURSE PRACTITIONER

## 2022-11-09 PROCEDURE — 1036F TOBACCO NON-USER: CPT | Performed by: NURSE PRACTITIONER

## 2022-11-09 PROCEDURE — 1090F PRES/ABSN URINE INCON ASSESS: CPT | Performed by: NURSE PRACTITIONER

## 2022-11-09 PROCEDURE — 1123F ACP DISCUSS/DSCN MKR DOCD: CPT | Performed by: NURSE PRACTITIONER

## 2022-11-09 PROCEDURE — G8399 PT W/DXA RESULTS DOCUMENT: HCPCS | Performed by: NURSE PRACTITIONER

## 2022-11-09 PROCEDURE — 3074F SYST BP LT 130 MM HG: CPT | Performed by: NURSE PRACTITIONER

## 2022-11-09 PROCEDURE — 99203 OFFICE O/P NEW LOW 30 MIN: CPT | Performed by: NURSE PRACTITIONER

## 2022-11-09 PROCEDURE — G8427 DOCREV CUR MEDS BY ELIG CLIN: HCPCS | Performed by: NURSE PRACTITIONER

## 2022-11-09 ASSESSMENT — ENCOUNTER SYMPTOMS
COUGH: 1
EYES NEGATIVE: 1
SINUS PRESSURE: 1
STRIDOR: 0
SORE THROAT: 1
RHINORRHEA: 0
SHORTNESS OF BREATH: 0
SINUS PAIN: 0

## 2022-11-09 NOTE — PROGRESS NOTES
[facility] Otolaryngology  Dr. Faith Reagan. EDMOND Sheth Ms.Ed. New Consult       Patient Name:  Dillon Burr  :  1950     CHIEF C/O:    Chief Complaint   Patient presents with    New Patient     Pt states sinus pressure, frontal, and sore throat and bilateral ear pain. Pain starting in the glands and going up to bilateral ears. 2       HISTORY OBTAINED FROM:  patient    HISTORY OF PRESENT ILLNESS:       César Jerome is a 74y.o. year old female, here today for sinus congestion and drainage, sore throat. Symptoms for 2 months  Persistent congestion and PND with sinus pressure  Denies rhinorrhea  Frequent throat clearing and sore throat  Intermittent bilateral ear pressure  Several trips to ER, urgent care, PCP  Also seen by Dr. Alton Sloan  Had CT of the sinuses  Multiple rounds of antibiotics and steroids  Is currently on flonase daily, was given astelin but told to stop by Alton Sloan  No previous hx of sinus surgeries  Has had hx of sinus infections  Patient states she is miserable and nothing is helping.         Past Medical History:   Diagnosis Date    Abdominal pain, other specified site 2012    Allergic urticaria     Cancer ([facility] [address]     1999 right breast cancer / treated with surgery and chemo    Depression 2000    Diet-controlled diabetes mellitus ([facility] [address]     Hyperlipidemia     DENIES    Hypothyroidism     Myofascial pain syndrome 2017    Pelvic - Dr Guzman Spearing    Osteoarthritis cervical spine     cervical spine    Pancreatic cancer [facility]) 2007    Partial Whipple Procedure - [facility]    Pancreatitis, acute 2012    Prolonged emergence from general anesthesia      Past Surgical History:   Procedure Laterality Date    BREAST RECONSTRUCTION Bilateral 2019    CAPSULECTOMY OF LEFT BREAST WITH REMOVAL OF SALINE  IMPLANTS AND REPLACMENT WITH BILATERAL SILICONE IMPLANTS performed by Fco Espino MD at [place] [address] Bilateral 2012    [address]  2007    with [facility]  2015    Dr. Charles Doe    to repair muscle     HERNIA REPAIR Right years ago    inguinal    HYSTERECTOMY (CERVIX STATUS UNKNOWN)  1978    MASTECTOMY, BILATERAL Bilateral 1999    radical mastectomy r / mastectomy at left    OTHER SURGICAL HISTORY Right 10/18/2017    EXCISION GANGLION CYST RIGHT MIDDLE FINGER    PANCREATECTOMY  2007    Whipple 1/3 taken        Current Outpatient Medications:     fluticasone (FLONASE) 50 MCG/ACT nasal spray, 1 spray by Each Nostril route 2 times daily, Disp: 1 each, Rfl: 3    CRANBERRY EXTRACT PO, Take 500 mg by mouth, Disp: , Rfl:     azelastine (ASTELIN) 0.1 % nasal spray, 2 sprays by Nasal route 2 times daily Use in each nostril as directed, Disp: 120 mL, Rfl: 1    FLUoxetine (PROZAC) 10 MG capsule, Take 1 capsule by mouth daily, Disp: 90 capsule, Rfl: 3    levothyroxine (SYNTHROID) 100 MCG tablet, One three times a week, Disp: 50 tablet, Rfl: 12    levothyroxine (SYNTHROID) 88 MCG tablet, One four times a week, Disp: 80 tablet, Rfl: 12    metoprolol succinate (TOPROL XL) 25 MG extended release tablet, Take 1 tablet by mouth 2 times daily (Patient taking differently: Take 25 mg by mouth daily), Disp: 180 tablet, Rfl: 3    Cranberry-Cholecalciferol 4200-500 MG-UNIT CAPS, Take by mouth, Disp: , Rfl:     calcium carbonate 600 MG TABS tablet, Take 1 tablet by mouth daily, Disp: , Rfl:     Cyanocobalamin (VITAMIN B-12) 1000 MCG SUBL, Place under the tongue, Disp: , Rfl:   Azithromycin, Doxycycline, and Zanaflex [tizanidine]  Social History     Tobacco Use    Smoking status: Never    Smokeless tobacco: Never   Vaping Use    Vaping Use: Never used   Substance Use Topics    Alcohol use: No    Drug use: No     Family History   Problem Relation Age of Onset    Coronary Art Dis Mother     Diabetes Mother     Colon Cancer Father     Diabetes Other     Colon Cancer Other     Atrial Fibrillation Brother     High Blood Pressure Brother     High Cholesterol Brother     High Cholesterol Brother     High Blood Pressure Brother        Review of Systems   Constitutional: Negative. Negative for activity change and appetite change. HENT:  Positive for congestion, ear pain (Ear pressure-intermittent), postnasal drip, sinus pressure and sore throat. Negative for rhinorrhea and sinus pain. Eyes: Negative. Respiratory:  Positive for cough. Negative for shortness of breath and stridor. Cardiovascular: Negative. Negative for chest pain and palpitations. Endocrine: Negative. Musculoskeletal: Negative. Skin: Negative. Neurological: Negative. Negative for dizziness. Hematological: Negative. Psychiatric/Behavioral: Negative. There were no vitals taken for this visit. Physical Exam  Constitutional:       Appearance: Normal appearance. HENT:      Head: Normocephalic. Right Ear: Tympanic membrane, ear canal and external ear normal. Decreased hearing noted. Left Ear: Tympanic membrane, ear canal and external ear normal. Decreased hearing noted. Ears:        Nose: No rhinorrhea. Right Turbinates: Pale. Not swollen. Left Turbinates: Pale. Not swollen. Mouth/Throat:      Lips: Pink. Mouth: Mucous membranes are moist.     Eyes:      Conjunctiva/sclera: Conjunctivae normal.      Pupils: Pupils are equal, round, and reactive to light. Cardiovascular:      Rate and Rhythm: Normal rate and regular rhythm. Pulses: Normal pulses. Pulmonary:      Effort: Pulmonary effort is normal. No respiratory distress. Breath sounds: No stridor. Musculoskeletal:         General: Normal range of motion. Cervical back: Normal range of motion. No rigidity. No muscular tenderness. Skin:     General: Skin is warm and dry. Neurological:      General: No focal deficit present. Mental Status: She is alert and oriented to person, place, and time.    Psychiatric:         Mood and Affect: Mood normal.         Behavior: Behavior normal.         Thought Content: Thought content normal.         Judgment: Judgment normal.       CT Sinus:  FINDINGS:   Minimal opacification at right maxillary sinus floor suggesting chronic   sinusitis change. No paranasal sinus fluid. Ostiomeatal units patent   although some encroachment by mucosal thickening on the right and Tania   cells on the left. Slight partial opacification left ethmoid air cells. Mild nasal septal deviation leftward. No acute soft tissue abnormality   identified           Impression   Minimal chronic sinusitis changes. No acute abnormality. IMPRESSION/PLAN:    Jeanne Coy was seen today for new patient. Diagnoses and all orders for this visit:    Allergic rhinitis, unspecified seasonality, unspecified trigger    Nasal congestion    Post-nasal drainage    DNS (deviated nasal septum)    Chronic ethmoidal sinusitis        CT of the sinuses reviewed with the patient showing mild leftward nasal septal deviation with scattered chronic ethmoidal sinusitis. At this time patient will continue with her Flonase, 1 spray each nostril twice daily and introduce Astelin spray, 1 to 2 sprays each nostril twice daily. She will also continue with her nasal saline spray, 2 sprays each nostril 3-4 times daily with nasal irrigation once daily. She will follow-up in 1 month. She is instructed to call with any new or worsening symptoms prior to her next appointment.       Richa Mcleod, MSN, FNP-C  [facility], Nose and Throat    The information contained in this note has been dictated using drug and medical speech recognition software and may contain errors

## 2022-11-09 NOTE — PATIENT INSTRUCTIONS
Flonase 1 spray each nostril twice daily, or 2 sprays each nostril    Astelin 1-2 sprays each nostril twice daily    Nasal saline 2 spray each nostril 3-4 times daily    Nasal irrigation once daily

## 2022-12-29 ENCOUNTER — TELEPHONE (OUTPATIENT)
Dept: NON INVASIVE DIAGNOSTICS | Age: 72
End: 2022-12-29

## 2022-12-29 NOTE — TELEPHONE ENCOUNTER
Spoke to the patient and tried to schedule appointment. She told me that she does not think that this appointment is necessary. The patient will call back to schedule in the future if she needs us.

## 2023-01-06 DIAGNOSIS — R73.03 PRE-DIABETES: Chronic | ICD-10-CM

## 2023-01-06 DIAGNOSIS — I10 ESSENTIAL HYPERTENSION: Chronic | ICD-10-CM

## 2023-01-06 DIAGNOSIS — M81.0 AGE-RELATED OSTEOPOROSIS WITHOUT CURRENT PATHOLOGICAL FRACTURE: ICD-10-CM

## 2023-01-06 DIAGNOSIS — E78.2 MIXED HYPERLIPIDEMIA: Chronic | ICD-10-CM

## 2023-01-06 DIAGNOSIS — E03.9 ACQUIRED HYPOTHYROIDISM: ICD-10-CM

## 2023-01-06 LAB
ALBUMIN SERPL-MCNC: 4.3 G/DL (ref 3.5–5.2)
ALP BLD-CCNC: 104 U/L (ref 35–104)
ALT SERPL-CCNC: 12 U/L (ref 0–32)
ANION GAP SERPL CALCULATED.3IONS-SCNC: 12 MMOL/L (ref 7–16)
AST SERPL-CCNC: 19 U/L (ref 0–31)
BACTERIA: NORMAL /HPF
BASOPHILS ABSOLUTE: 0.06 E9/L (ref 0–0.2)
BASOPHILS RELATIVE PERCENT: 0.8 % (ref 0–2)
BILIRUB SERPL-MCNC: 0.4 MG/DL (ref 0–1.2)
BILIRUBIN URINE: NEGATIVE
BLOOD, URINE: ABNORMAL
BUN BLDV-MCNC: 14 MG/DL (ref 6–23)
CALCIUM SERPL-MCNC: 10.1 MG/DL (ref 8.6–10.2)
CHLORIDE BLD-SCNC: 104 MMOL/L (ref 98–107)
CHOLESTEROL, TOTAL: 245 MG/DL (ref 0–199)
CLARITY: CLEAR
CO2: 27 MMOL/L (ref 22–29)
COLOR: YELLOW
CREAT SERPL-MCNC: 0.7 MG/DL (ref 0.5–1)
EOSINOPHILS ABSOLUTE: 0.21 E9/L (ref 0.05–0.5)
EOSINOPHILS RELATIVE PERCENT: 2.9 % (ref 0–6)
GFR SERPL CREATININE-BSD FRML MDRD: >60 ML/MIN/1.73
GLUCOSE BLD-MCNC: 109 MG/DL (ref 74–99)
GLUCOSE URINE: NEGATIVE MG/DL
HBA1C MFR BLD: 5.8 % (ref 4–5.6)
HCT VFR BLD CALC: 40 % (ref 34–48)
HDLC SERPL-MCNC: 62 MG/DL
HEMOGLOBIN: 12.9 G/DL (ref 11.5–15.5)
IMMATURE GRANULOCYTES #: 0.03 E9/L
IMMATURE GRANULOCYTES %: 0.4 % (ref 0–5)
KETONES, URINE: NEGATIVE MG/DL
LDL CHOLESTEROL CALCULATED: 158 MG/DL (ref 0–99)
LEUKOCYTE ESTERASE, URINE: NEGATIVE
LYMPHOCYTES ABSOLUTE: 1.61 E9/L (ref 1.5–4)
LYMPHOCYTES RELATIVE PERCENT: 22.3 % (ref 20–42)
MCH RBC QN AUTO: 27.6 PG (ref 26–35)
MCHC RBC AUTO-ENTMCNC: 32.3 % (ref 32–34.5)
MCV RBC AUTO: 85.5 FL (ref 80–99.9)
MONOCYTES ABSOLUTE: 0.6 E9/L (ref 0.1–0.95)
MONOCYTES RELATIVE PERCENT: 8.3 % (ref 2–12)
NEUTROPHILS ABSOLUTE: 4.72 E9/L (ref 1.8–7.3)
NEUTROPHILS RELATIVE PERCENT: 65.3 % (ref 43–80)
NITRITE, URINE: NEGATIVE
PDW BLD-RTO: 13.3 FL (ref 11.5–15)
PH UA: 6 (ref 5–9)
PLATELET # BLD: 285 E9/L (ref 130–450)
PMV BLD AUTO: 11.1 FL (ref 7–12)
POTASSIUM SERPL-SCNC: 5.5 MMOL/L (ref 3.5–5)
PROTEIN UA: NEGATIVE MG/DL
RBC # BLD: 4.68 E12/L (ref 3.5–5.5)
RBC UA: NORMAL /HPF (ref 0–2)
SODIUM BLD-SCNC: 143 MMOL/L (ref 132–146)
SPECIFIC GRAVITY UA: <=1.005 (ref 1–1.03)
T4 TOTAL: 9.4 MCG/DL (ref 4.5–11.7)
TOTAL PROTEIN: 7.3 G/DL (ref 6.4–8.3)
TRIGL SERPL-MCNC: 126 MG/DL (ref 0–149)
TSH SERPL DL<=0.05 MIU/L-ACNC: 0.34 UIU/ML (ref 0.27–4.2)
UROBILINOGEN, URINE: 0.2 E.U./DL
VITAMIN D 25-HYDROXY: 28 NG/ML (ref 30–100)
VLDLC SERPL CALC-MCNC: 25 MG/DL
WBC # BLD: 7.2 E9/L (ref 4.5–11.5)
WBC UA: NORMAL /HPF (ref 0–5)

## 2023-01-18 ENCOUNTER — TELEPHONE (OUTPATIENT)
Dept: CARDIOLOGY CLINIC | Age: 73
End: 2023-01-18

## 2023-01-18 NOTE — TELEPHONE ENCOUNTER
Patient states that she has been having palpitations for 2weeks, when she gets these episodes she gets dizzy,fatigued and SOB, she was taking toprol 25mg BID but felt like a \"zombie\" so she reduced it to once daily, please advise

## 2023-01-18 NOTE — TELEPHONE ENCOUNTER
Hydrate, electrolytes and rest. Take toprol BID until palpitations subside then she can go to daily again if she would like. Ryan Hanks D.O.   Cardiologist  Cardiology, [facility]

## 2023-01-23 ENCOUNTER — OFFICE VISIT (OUTPATIENT)
Dept: PRIMARY CARE CLINIC | Age: 73
End: 2023-01-23
Payer: MEDICARE

## 2023-01-23 VITALS
HEIGHT: 60 IN | BODY MASS INDEX: 30.63 KG/M2 | SYSTOLIC BLOOD PRESSURE: 138 MMHG | DIASTOLIC BLOOD PRESSURE: 82 MMHG | TEMPERATURE: 97.9 F | WEIGHT: 156 LBS

## 2023-01-23 DIAGNOSIS — M15.9 PRIMARY OSTEOARTHRITIS INVOLVING MULTIPLE JOINTS: ICD-10-CM

## 2023-01-23 DIAGNOSIS — M81.0 AGE-RELATED OSTEOPOROSIS WITHOUT CURRENT PATHOLOGICAL FRACTURE: ICD-10-CM

## 2023-01-23 DIAGNOSIS — E11.9 DIET-CONTROLLED DIABETES MELLITUS (HCC): ICD-10-CM

## 2023-01-23 DIAGNOSIS — E78.2 MIXED HYPERLIPIDEMIA: Chronic | ICD-10-CM

## 2023-01-23 DIAGNOSIS — E03.9 ACQUIRED HYPOTHYROIDISM: Chronic | ICD-10-CM

## 2023-01-23 DIAGNOSIS — Z00.00 MEDICARE ANNUAL WELLNESS VISIT, SUBSEQUENT: Primary | ICD-10-CM

## 2023-01-23 DIAGNOSIS — I10 ESSENTIAL HYPERTENSION: Chronic | ICD-10-CM

## 2023-01-23 DIAGNOSIS — F41.9 ANXIETY: ICD-10-CM

## 2023-01-23 PROBLEM — R73.03 PRE-DIABETES: Chronic | Status: RESOLVED | Noted: 2020-01-08 | Resolved: 2023-01-23

## 2023-01-23 PROCEDURE — 3017F COLORECTAL CA SCREEN DOC REV: CPT | Performed by: FAMILY MEDICINE

## 2023-01-23 PROCEDURE — G0439 PPPS, SUBSEQ VISIT: HCPCS | Performed by: FAMILY MEDICINE

## 2023-01-23 PROCEDURE — G8482 FLU IMMUNIZE ORDER/ADMIN: HCPCS | Performed by: FAMILY MEDICINE

## 2023-01-23 PROCEDURE — 3044F HG A1C LEVEL LT 7.0%: CPT | Performed by: FAMILY MEDICINE

## 2023-01-23 PROCEDURE — 3079F DIAST BP 80-89 MM HG: CPT | Performed by: FAMILY MEDICINE

## 2023-01-23 PROCEDURE — 3075F SYST BP GE 130 - 139MM HG: CPT | Performed by: FAMILY MEDICINE

## 2023-01-23 PROCEDURE — 1123F ACP DISCUSS/DSCN MKR DOCD: CPT | Performed by: FAMILY MEDICINE

## 2023-01-23 RX ORDER — FLUOXETINE 10 MG/1
10 CAPSULE ORAL DAILY
Qty: 90 CAPSULE | Refills: 3 | Status: SHIPPED | OUTPATIENT
Start: 2023-01-23

## 2023-01-23 RX ORDER — LEVOTHYROXINE SODIUM 88 UG/1
TABLET ORAL
Qty: 80 TABLET | Refills: 12 | Status: SHIPPED | OUTPATIENT
Start: 2023-01-23

## 2023-01-23 RX ORDER — LEVOTHYROXINE SODIUM 0.1 MG/1
TABLET ORAL
Qty: 50 TABLET | Refills: 12 | Status: SHIPPED | OUTPATIENT
Start: 2023-01-23

## 2023-01-23 ASSESSMENT — PATIENT HEALTH QUESTIONNAIRE - PHQ9
SUM OF ALL RESPONSES TO PHQ QUESTIONS 1-9: 0
2. FEELING DOWN, DEPRESSED OR HOPELESS: 0
1. LITTLE INTEREST OR PLEASURE IN DOING THINGS: 0
SUM OF ALL RESPONSES TO PHQ9 QUESTIONS 1 & 2: 0

## 2023-01-23 ASSESSMENT — LIFESTYLE VARIABLES
HOW OFTEN DO YOU HAVE A DRINK CONTAINING ALCOHOL: NEVER
HOW MANY STANDARD DRINKS CONTAINING ALCOHOL DO YOU HAVE ON A TYPICAL DAY: PATIENT DOES NOT DRINK

## 2023-01-23 NOTE — PROGRESS NOTES
Medicare Annual Wellness Visit    Liam Spear is here for Medicare AWV    Assessment & Plan   Medicare annual wellness visit, subsequent  Acquired hypothyroidism  -     levothyroxine (SYNTHROID) 88 MCG tablet; One four times a week, Disp-80 tablet, R-12Normal  -     levothyroxine (SYNTHROID) 100 MCG tablet; One three times a week, Disp-50 tablet, R-12Normal  -     T4; Future  -     TSH; Future  Diet-controlled diabetes mellitus ([address]  -     CBC with Auto Differential; Future  -     Comprehensive Metabolic Panel; Future  -     Hemoglobin A1C; Future  -     Lipid Panel; Future  -     T4; Future  -     TSH; Future  -     Urinalysis; Future  -     Vitamin D 25 Hydroxy; Future  Essential hypertension  -     CBC with Auto Differential; Future  -     Comprehensive Metabolic Panel; Future  -     Hemoglobin A1C; Future  -     Lipid Panel; Future  -     T4; Future  -     TSH; Future  -     Urinalysis; Future  -     Vitamin D 25 Hydroxy; Future  Mixed hyperlipidemia  -     CBC with Auto Differential; Future  -     Comprehensive Metabolic Panel; Future  -     Hemoglobin A1C; Future  -     Lipid Panel; Future  -     T4; Future  -     TSH; Future  -     Urinalysis; Future  -     Vitamin D 25 Hydroxy; Future  Primary osteoarthritis involving multiple joints  Anxiety  -     FLUoxetine (PROZAC) 10 MG capsule; Take 1 capsule by mouth daily, Disp-90 capsule, R-3Normal  Age-related osteoporosis without current pathological fracture  -     Vitamin D 25 Hydroxy; Future    Recommendations for Preventive Services Due: see orders and patient instructions/AVS.  Recommended screening schedule for the next 5-10 years is provided to the patient in written form: see Patient Instructions/AVS.     Return in 6 months (on 7/23/2023) for Lab Before.      Subjective   The following acute and/or chronic problems were also addressed today:     6 mo ck    bp    chol thryoid   oa   sinus      Feel ok s aw kaleigh alegria     Legs   occas numb b ut does not want to pursue now     some lwo back pain    D low      Patient's complete Health Risk Assessment and screening values have been reviewed and are found in Flowsheets. The following problems were reviewed today and where indicated follow up appointments were made and/or referrals ordered. Positive Risk Factor Screenings with Interventions:                 Weight and Activity:  Physical Activity: Inactive    Days of Exercise per Week: 0 days    Minutes of Exercise per Session: 0 min     On average, how many days per week do you engage in moderate to strenuous exercise (like a brisk walk)?: 0 days  Have you lost any weight without trying in the past 3 months?: No  Body mass index: (!) 30.46    Inactivity Interventions:  Patient declined any further interventions or treatment  Obesity Interventions:  Patient declines any further evaluation or treatment                               Objective   Vitals:    01/23/23 1115   BP: 138/82   Temp: 97.9 °F (36.6 °C)   TempSrc: Oral   Weight: 156 lb (70.8 kg)   Height: 5' (1.524 m)      Body mass index is 30.47 kg/m².       General Appearance: alert and oriented to person, place and time, well developed and well- nourished, in no acute distress  Skin: warm and dry, no rash or erythema  Head: normocephalic and atraumatic  Eyes: pupils equal, round, and reactive to light, extraocular eye movements intact, conjunctivae normal  ENT: tympanic membrane, external ear and ear canal normal bilaterally, nose without deformity, nasal mucosa and turbinates normal without polyps  Neck: supple and non-tender without mass, no thyromegaly or thyroid nodules, no cervical lymphadenopathy  Pulmonary/Chest: clear to auscultation bilaterally- no wheezes, rales or rhonchi, normal air movement, no respiratory distress  Cardiovascular: normal rate, regular rhythm, normal S1 and S2, no murmurs, rubs, clicks, or gallops, distal pulses intact, no carotid bruits  Abdomen: soft, non-tender, non-distended, normal bowel sounds, no masses or organomegaly  Extremities: no cyanosis, clubbing or edema  Musculoskeletal: normal range of motion, no joint swelling, deformity or tenderness  Neurologic: reflexes normal and symmetric, no cranial nerve deficit, gait, coordination and speech normal       Allergies   Allergen Reactions    Azithromycin      Stomach pains    Doxycycline      Stomach pains    Zanaflex [Tizanidine]      Stomach pains     Prior to Visit Medications    Medication Sig Taking?  Authorizing Provider   levothyroxine (SYNTHROID) 88 MCG tablet One four times a week Yes Valeriano Robbins DO   levothyroxine (SYNTHROID) 100 MCG tablet One three times a week Yes Valeriano Robibns DO   FLUoxetine (PROZAC) 10 MG capsule Take 1 capsule by mouth daily Yes Valeriano Robbins DO   fluticasone (FLONASE) 50 MCG/ACT nasal spray 1 spray by Each Nostril route 2 times daily  Valeriano Robbins DO   CRANBERRY EXTRACT PO Take 500 mg by mouth  Historical Provider, MD   azelastine (ASTELIN) 0.1 % nasal spray 2 sprays by Nasal route 2 times daily Use in each nostril as directed  Valeriano Robbins DO   metoprolol succinate (TOPROL XL) 25 MG extended release tablet Take 1 tablet by mouth 2 times daily  Patient taking differently: Take 25 mg by mouth daily  Nicole Ayers DO   Cranberry-Cholecalciferol 4200-500 MG-UNIT CAPS Take by mouth  Historical Provider, MD   calcium carbonate 600 MG TABS tablet Take 1 tablet by mouth daily  Historical Provider, MD   Cyanocobalamin (VITAMIN B-12) 1000 MCG SUBL Place under the tongue  Historical Provider, MD Garcia (Including outside providers/suppliers regularly involved in providing care):   Patient Care Team:  Viry Garcia DO as PCP - General (Family Medicine)  Viry Garcia DO as PCP - [facility] Empaneled Provider  Nicole Ayers DO as Consulting Physician (Cardiology)  Lela Crawford MD as Consulting Physician (Electrophysiology)     Reviewed and updated this visit:  Tobacco  Allergies  Med Hx  Surg Hx  Soc Hx  Fam Hx             Diet exer hm issues  lsoe wt    exer  lwo carb diet      I educated the patient about all medications. Make sure they were correct and educated  on the risk associated with missing meds or taking them incorrectly. A list of medications is being sent home with patient today. Check blood pressure at home twice a day. Low-salt low caffeine diet. Call if systolic blood pressure is above 702 and diastolic blood pressures above 85. Only use a upper arm digital cuff. Aggressive low-fat diet. Avoid red meats, greasy fried foods, dairy products. Avoid processed foods. Take cholesterol medications without food. A great deal of time spent reviewing medications, diet, exercise, social issues. Also reviewing the chart before entering the room with patient and finishing charting after leaving patient's room. More than half of that time was spent face to face with the patient in counseling and coordinating care. I informed patient about the risk associated with noncompliance of medication and taking medications incorrectly. Appropriate follow-up with myself and all specialist.  Encourage family members to take active role in assisting with medications and medical care.   If any confusion should develop to notify my office immediately to avoid risk of worsening medical condition

## 2023-01-23 NOTE — PATIENT INSTRUCTIONS
Advance Directives: Care Instructions  Overview  An advance directive is a legal way to state your wishes at the end of your life. It tells your family and your doctor what to do if you can't say what you want. There are two main types of advance directives. You can change them any time your wishes change. Living will. This form tells your family and your doctor your wishes about life support and other treatment. The form is also called a declaration. Medical power of . This form lets you name a person to make treatment decisions for you when you can't speak for yourself. This person is called a health care agent (health care proxy, health care surrogate). The form is also called a durable power of  for health care. If you do not have an advance directive, decisions about your medical care may be made by a family member, or by a doctor or a  who doesn't know you. It may help to think of an advance directive as a gift to the people who care for you. If you have one, they won't have to make tough decisions by themselves. For more information, including forms for your state, see the [address] website (www.caringinfo.org/planning/advance-directives/). Follow-up care is a key part of your treatment and safety. Be sure to make and go to all appointments, and call your doctor if you are having problems. It's also a good idea to know your test results and keep a list of the medicines you take. What should you include in an advance directive? Many states have a unique advance directive form. (It may ask you to address specific issues.) Or you might use a universal form that's approved by many states. If your form doesn't tell you what to address, it may be hard to know what to include in your advance directive. Use the questions below to help you get started. Who do you want to make decisions about your medical care if you are not able to?   What life-support measures do you want if you have a serious illness that gets worse over time or can't be cured? What are you most afraid of that might happen? (Maybe you're afraid of having pain, losing your independence, or being kept alive by machines.)  Where would you prefer to die? (Your home? A hospital? A nursing home?)  Do you want to donate your organs when you die? Do you want certain [employer] practices performed before you die? When should you call for help? Be sure to contact your doctor if you have any questions. Where can you learn more? Go to http://www.cano.com/ and enter R264 to learn more about \"Advance Directives: Care Instructions. \"  Current as of: June 16, 2022               Content Version: 13.5  © 3153-0714 Healthwise, [employer]. Care instructions adapted under license by [facility] ([facility]). If you have questions about a medical condition or this instruction, always ask your healthcare professional. [place] [address] any warranty or liability for your use of this information. Personalized Preventive Plan for Ant Mauro - 1/23/2023  Medicare offers a range of preventive health benefits. Some of the tests and screenings are paid in full while other may be subject to a deductible, co-insurance, and/or copay. Some of these benefits include a comprehensive review of your medical history including lifestyle, illnesses that may run in your family, and various assessments and screenings as appropriate. After reviewing your medical record and screening and assessments performed today your provider may have ordered immunizations, labs, imaging, and/or referrals for you. A list of these orders (if applicable) as well as your Preventive Care list are included within your After Visit Summary for your review.     Other Preventive Recommendations:    A preventive eye exam performed by an eye specialist is recommended every 1-2 years to screen for glaucoma; cataracts, macular degeneration, and other eye disorders. A preventive dental visit is recommended every 6 months. Try to get at least 150 minutes of exercise per week or 10,000 steps per day on a pedometer . Order or download the FREE \"Exercise & Physical Activity: Your Everyday Guide\" from [employer]. Call 4-507.510.1377 or search [employer] online. You need 3469-3997 mg of calcium and 9208-0917 IU of vitamin D per day. It is possible to meet your calcium requirement with diet alone, but a vitamin D supplement is usually necessary to meet this goal.  When exposed to the sun, use a sunscreen that protects against both UVA and UVB radiation with an SPF of 30 or greater. Reapply every 2 to 3 hours or after sweating, drying off with a towel, or swimming. Always wear a seat belt when traveling in a car. Always wear a helmet when riding a bicycle or motorcycle.

## 2023-02-06 DIAGNOSIS — F41.9 ANXIETY: ICD-10-CM

## 2023-02-06 DIAGNOSIS — E03.9 ACQUIRED HYPOTHYROIDISM: Chronic | ICD-10-CM

## 2023-02-06 RX ORDER — FLUOXETINE 10 MG/1
10 CAPSULE ORAL DAILY
Qty: 90 CAPSULE | Refills: 3 | Status: SHIPPED | OUTPATIENT
Start: 2023-02-06

## 2023-02-06 RX ORDER — LEVOTHYROXINE SODIUM 88 UG/1
TABLET ORAL
Qty: 80 TABLET | Refills: 3 | Status: SHIPPED | OUTPATIENT
Start: 2023-02-06

## 2023-02-23 DIAGNOSIS — I10 ESSENTIAL HYPERTENSION: Chronic | ICD-10-CM

## 2023-02-23 RX ORDER — METOPROLOL SUCCINATE 25 MG/1
TABLET, EXTENDED RELEASE ORAL
Qty: 180 TABLET | Refills: 3 | Status: SHIPPED | OUTPATIENT
Start: 2023-02-23

## 2023-03-21 ENCOUNTER — TELEPHONE (OUTPATIENT)
Dept: PRIMARY CARE CLINIC | Age: 73
End: 2023-03-21

## 2023-03-21 DIAGNOSIS — U07.1 COVID-19: Primary | ICD-10-CM

## 2023-03-21 RX ORDER — METHYLPREDNISOLONE 4 MG/1
TABLET ORAL
Qty: 1 KIT | Refills: 0 | Status: SHIPPED | OUTPATIENT
Start: 2023-03-21

## 2023-03-21 RX ORDER — CEFDINIR 300 MG/1
300 CAPSULE ORAL 2 TIMES DAILY
Qty: 20 CAPSULE | Refills: 0 | Status: SHIPPED | OUTPATIENT
Start: 2023-03-21 | End: 2023-03-31

## 2023-03-21 NOTE — TELEPHONE ENCOUNTER
The pt is calling because she just wanted you to know that she tested positive for covid yesterday she says it just feels like she has a bad cold, a lot coughing

## 2023-03-21 NOTE — TELEPHONE ENCOUNTER
Patient I sent 3 prescription sent to [email or web address]. Take zinc and vitamin C. Increase fluids. 3 meals a day. Purchase a finger oxygen. Check multiple times a day on ambulation call less than 90 persistently low 85 go to ER.   [facility] care if not better soon if bad go to ER

## 2023-03-24 ENCOUNTER — TELEPHONE (OUTPATIENT)
Dept: PRIMARY CARE CLINIC | Age: 73
End: 2023-03-24

## 2023-03-24 NOTE — TELEPHONE ENCOUNTER
On Medrol Dose Pack for + covid and cough. All up day yesterday and into the night wide awake. Said she is sure its the steroids that is causing this and wanting to stop medication. Had an issue with steroids before.

## 2023-03-24 NOTE — TELEPHONE ENCOUNTER
It could be hard to sleep with steroids. Okay to stop cold.   If she is not feeling well come to [facility]

## 2023-05-08 RX ORDER — FLUTICASONE PROPIONATE 50 MCG
1 SPRAY, SUSPENSION (ML) NASAL 2 TIMES DAILY
Qty: 1 EACH | Refills: 5 | Status: SHIPPED | OUTPATIENT
Start: 2023-05-08

## 2023-05-13 ENCOUNTER — OFFICE VISIT (OUTPATIENT)
Dept: PRIMARY CARE CLINIC | Age: 73
End: 2023-05-13

## 2023-05-13 DIAGNOSIS — R20.2 NUMBNESS AND TINGLING OF BOTH LOWER EXTREMITIES: Primary | ICD-10-CM

## 2023-05-13 DIAGNOSIS — R41.3 MEMORY IMPAIRMENT: ICD-10-CM

## 2023-05-13 DIAGNOSIS — R20.0 NUMBNESS AND TINGLING OF BOTH LOWER EXTREMITIES: Primary | ICD-10-CM

## 2023-05-13 DIAGNOSIS — G89.29 CHRONIC BILATERAL LOW BACK PAIN WITHOUT SCIATICA: ICD-10-CM

## 2023-05-13 DIAGNOSIS — M17.0 PRIMARY OSTEOARTHRITIS OF BOTH KNEES: ICD-10-CM

## 2023-05-13 DIAGNOSIS — M54.50 CHRONIC BILATERAL LOW BACK PAIN WITHOUT SCIATICA: ICD-10-CM

## 2023-05-13 NOTE — PROGRESS NOTES
23  Name: Josie Camejo    : 1950    Sex: female    Age: 74 y.o. Subjective:  Chief Complaint: Patient is here for multiple issues. Memory fatigue urine frequency arthritic symptoms low back pain tingling lower extremities with numbness heel pain    Patient presents with a great deal of issues. She is concerned about her memory since she had COVID is slightly impaired. Told her we will schedule memory evaluation apex may take several weeks if she feels her memory gets better she can stop that referral.  Fatigue some urinary frequency she thinks also since COVID. 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  Seeing Dr. Babak Frias secondary to heel pain. She states that her right lower extremities been swollen since she was a child. Her mother told her she said some form of polio  She had shots in her low back. MRI noted from 2021 with narrowing      Review of Systems   Constitutional: Negative. HENT: Negative. Eyes: Negative. Respiratory: Negative. Cardiovascular: Negative. Gastrointestinal: Negative. Endocrine: Negative. Genitourinary: Negative. Musculoskeletal:  Positive for back pain. Skin: Negative. Allergic/Immunologic: Negative. Neurological:  Positive for numbness. Hematological: Negative. Psychiatric/Behavioral: Negative.          Current Outpatient Medications:     fluticasone (FLONASE) 50 MCG/ACT nasal spray, 1 spray by Each Nostril route 2 times daily, Disp: 1 each, Rfl: 5    methylPREDNISolone (MEDROL DOSEPACK) 4 MG tablet, Take by mouth., Disp: 1 kit, Rfl: 0    metoprolol succinate (TOPROL XL) 25 MG extended release tablet, TAKE ONE TABLET BY MOUTH

## 2023-05-14 VITALS
HEART RATE: 70 BPM | TEMPERATURE: 98.2 F | HEIGHT: 60 IN | BODY MASS INDEX: 30.82 KG/M2 | WEIGHT: 157 LBS | DIASTOLIC BLOOD PRESSURE: 80 MMHG | OXYGEN SATURATION: 97 % | SYSTOLIC BLOOD PRESSURE: 134 MMHG

## 2023-05-14 PROBLEM — G89.29 CHRONIC BILATERAL LOW BACK PAIN WITHOUT SCIATICA: Status: ACTIVE | Noted: 2023-05-14

## 2023-05-14 PROBLEM — M54.50 CHRONIC BILATERAL LOW BACK PAIN WITHOUT SCIATICA: Status: ACTIVE | Noted: 2023-05-14

## 2023-05-14 PROBLEM — M17.0 PRIMARY OSTEOARTHRITIS OF BOTH KNEES: Status: ACTIVE | Noted: 2023-05-14

## 2023-05-14 PROBLEM — R41.3 MEMORY IMPAIRMENT: Status: ACTIVE | Noted: 2023-05-14

## 2023-05-14 PROBLEM — R20.2 NUMBNESS AND TINGLING OF BOTH LOWER EXTREMITIES: Status: ACTIVE | Noted: 2023-05-14

## 2023-05-14 PROBLEM — R20.0 NUMBNESS AND TINGLING OF BOTH LOWER EXTREMITIES: Status: ACTIVE | Noted: 2023-05-14

## 2023-05-14 ASSESSMENT — PATIENT HEALTH QUESTIONNAIRE - PHQ9
SUM OF ALL RESPONSES TO PHQ QUESTIONS 1-9: 0
1. LITTLE INTEREST OR PLEASURE IN DOING THINGS: 0
2. FEELING DOWN, DEPRESSED OR HOPELESS: 0
SUM OF ALL RESPONSES TO PHQ QUESTIONS 1-9: 0
SUM OF ALL RESPONSES TO PHQ9 QUESTIONS 1 & 2: 0
SUM OF ALL RESPONSES TO PHQ QUESTIONS 1-9: 0
SUM OF ALL RESPONSES TO PHQ QUESTIONS 1-9: 0

## 2023-05-14 ASSESSMENT — ENCOUNTER SYMPTOMS
GASTROINTESTINAL NEGATIVE: 1
RESPIRATORY NEGATIVE: 1
ALLERGIC/IMMUNOLOGIC NEGATIVE: 1
BACK PAIN: 1
EYES NEGATIVE: 1

## 2023-05-19 ENCOUNTER — OFFICE VISIT (OUTPATIENT)
Dept: PODIATRY | Age: 73
End: 2023-05-19
Payer: MEDICARE

## 2023-05-19 VITALS — WEIGHT: 152 LBS | BODY MASS INDEX: 29.84 KG/M2 | HEIGHT: 60 IN

## 2023-05-19 DIAGNOSIS — M77.52 TENDINITIS OF LEFT FOOT: Primary | ICD-10-CM

## 2023-05-19 DIAGNOSIS — M79.672 LEFT FOOT PAIN: ICD-10-CM

## 2023-05-19 DIAGNOSIS — M79.671 RIGHT FOOT PAIN: ICD-10-CM

## 2023-05-19 DIAGNOSIS — R26.2 DIFFICULTY WALKING: ICD-10-CM

## 2023-05-19 DIAGNOSIS — M72.2 PLANTAR FASCIITIS: ICD-10-CM

## 2023-05-19 PROCEDURE — 3017F COLORECTAL CA SCREEN DOC REV: CPT | Performed by: PODIATRIST

## 2023-05-19 PROCEDURE — 99213 OFFICE O/P EST LOW 20 MIN: CPT | Performed by: PODIATRIST

## 2023-05-19 PROCEDURE — 1090F PRES/ABSN URINE INCON ASSESS: CPT | Performed by: PODIATRIST

## 2023-05-19 PROCEDURE — G8417 CALC BMI ABV UP PARAM F/U: HCPCS | Performed by: PODIATRIST

## 2023-05-19 PROCEDURE — 1123F ACP DISCUSS/DSCN MKR DOCD: CPT | Performed by: PODIATRIST

## 2023-05-19 PROCEDURE — 1036F TOBACCO NON-USER: CPT | Performed by: PODIATRIST

## 2023-05-19 PROCEDURE — G8399 PT W/DXA RESULTS DOCUMENT: HCPCS | Performed by: PODIATRIST

## 2023-05-19 PROCEDURE — G8427 DOCREV CUR MEDS BY ELIG CLIN: HCPCS | Performed by: PODIATRIST

## 2023-05-19 NOTE — PROGRESS NOTES
23     Pamelia Barthel    : 1950   Sex: female    Age: 74 y.o. Patient's PCP/Provider is:  Rumaldo Duane Minotti, DO    Subjective:  Patient is seen today for evaluation regarding new onset pain and swelling into her left midfoot region. Patient has had issues over the last several weeks and is progressively gotten worse. She states she has discomfort towards the end of her day with being on her feet. Patient is also started get some compensatory plantar fascial issues on the right foot. No history of injury noted. No other additional abnormalities noted. Chief Complaint   Patient presents with    Foot Pain    Toe Pain     Heel and toe pain       ROS:  Const: Positives and pertinent negatives as per HPI. Musculo: Denies symptoms other than stated above. Neuro: Denies symptoms other than stated above. Skin: Denies symptoms other than stated above.     Current Medications:    Current Outpatient Medications:     fluticasone (FLONASE) 50 MCG/ACT nasal spray, 1 spray by Each Nostril route 2 times daily, Disp: 1 each, Rfl: 5    methylPREDNISolone (MEDROL DOSEPACK) 4 MG tablet, Take by mouth., Disp: 1 kit, Rfl: 0    metoprolol succinate (TOPROL XL) 25 MG extended release tablet, TAKE ONE TABLET BY MOUTH TWO TIMES A DAY, Disp: 180 tablet, Rfl: 3    levothyroxine (SYNTHROID) 88 MCG tablet, One four times a week, Disp: 80 tablet, Rfl: 3    FLUoxetine (PROZAC) 10 MG capsule, Take 1 capsule by mouth daily, Disp: 90 capsule, Rfl: 3    levothyroxine (SYNTHROID) 100 MCG tablet, One three times a week, Disp: 50 tablet, Rfl: 12    CRANBERRY EXTRACT PO, Take 500 mg by mouth, Disp: , Rfl:     azelastine (ASTELIN) 0.1 % nasal spray, 2 sprays by Nasal route 2 times daily Use in each nostril as directed, Disp: 120 mL, Rfl: 1    Cranberry-Cholecalciferol 4200-500 MG-UNIT CAPS, Take by mouth, Disp: , Rfl:     calcium carbonate 600 MG TABS tablet, Take 1 tablet by mouth daily, Disp: , Rfl:     Cyanocobalamin

## 2023-05-19 NOTE — PROGRESS NOTES
Patient is here today since Feb of 2022 with a new issue. She reports pain to the right heel and great toe and pain to the left 3rd, 4th, and 5th toes with associated pain to the top and bottom of the foot. She has been having issues for the past 3-4 months. PCP is Dr. Marylu Fernandez, last seen 05/13/2023.

## 2023-05-23 ENCOUNTER — TELEPHONE (OUTPATIENT)
Dept: PRIMARY CARE CLINIC | Age: 73
End: 2023-05-23

## 2023-05-23 DIAGNOSIS — M17.0 PRIMARY OSTEOARTHRITIS OF BOTH KNEES: ICD-10-CM

## 2023-05-23 DIAGNOSIS — R20.0 NUMBNESS AND TINGLING OF BOTH LOWER EXTREMITIES: Primary | ICD-10-CM

## 2023-05-23 DIAGNOSIS — M54.50 CHRONIC BILATERAL LOW BACK PAIN WITHOUT SCIATICA: ICD-10-CM

## 2023-05-23 DIAGNOSIS — G89.29 CHRONIC BILATERAL LOW BACK PAIN WITHOUT SCIATICA: ICD-10-CM

## 2023-05-23 DIAGNOSIS — R20.2 NUMBNESS AND TINGLING OF BOTH LOWER EXTREMITIES: Primary | ICD-10-CM

## 2023-06-02 ENCOUNTER — OFFICE VISIT (OUTPATIENT)
Dept: PODIATRY | Age: 73
End: 2023-06-02

## 2023-06-02 VITALS — WEIGHT: 152 LBS | HEIGHT: 61 IN | BODY MASS INDEX: 28.7 KG/M2

## 2023-06-02 DIAGNOSIS — M79.672 LEFT FOOT PAIN: ICD-10-CM

## 2023-06-02 DIAGNOSIS — M76.71 PERONEAL TENDINITIS, RIGHT: Primary | ICD-10-CM

## 2023-06-02 DIAGNOSIS — R60.0 LOCALIZED EDEMA: ICD-10-CM

## 2023-06-02 DIAGNOSIS — M77.52 TENDINITIS OF LEFT FOOT: ICD-10-CM

## 2023-06-02 DIAGNOSIS — M79.671 RIGHT FOOT PAIN: ICD-10-CM

## 2023-06-02 DIAGNOSIS — R26.2 DIFFICULTY WALKING: ICD-10-CM

## 2023-06-02 NOTE — PROGRESS NOTES
23     Exie     : 1950   Sex: female    Age: 74 y.o. Patient's PCP/Provider is:  Nia Robbins DO    Subjective:  Patient is seen today for follow-up regarding tendinitis issues noted left lower extremity. Patient is doing well at this time with minimal issues noted on the left side. Patient did notice some increased swelling and pain to the lateral right midfoot region. No other additional abnormalities noted. Chief Complaint   Patient presents with    Foot Pain     Pain resolved left foot, having pain in right foot. Patient had bilateral foot xray done today  Valeriano Robbins DO  23          ROS:  Const: Positives and pertinent negatives as per HPI. Musculo: Denies symptoms other than stated above. Neuro: Denies symptoms other than stated above. Skin: Denies symptoms other than stated above.     Current Medications:    Current Outpatient Medications:     fluticasone (FLONASE) 50 MCG/ACT nasal spray, 1 spray by Each Nostril route 2 times daily, Disp: 1 each, Rfl: 5    methylPREDNISolone (MEDROL DOSEPACK) 4 MG tablet, Take by mouth., Disp: 1 kit, Rfl: 0    metoprolol succinate (TOPROL XL) 25 MG extended release tablet, TAKE ONE TABLET BY MOUTH TWO TIMES A DAY, Disp: 180 tablet, Rfl: 3    levothyroxine (SYNTHROID) 88 MCG tablet, One four times a week, Disp: 80 tablet, Rfl: 3    FLUoxetine (PROZAC) 10 MG capsule, Take 1 capsule by mouth daily, Disp: 90 capsule, Rfl: 3    levothyroxine (SYNTHROID) 100 MCG tablet, One three times a week, Disp: 50 tablet, Rfl: 12    CRANBERRY EXTRACT PO, Take 500 mg by mouth, Disp: , Rfl:     azelastine (ASTELIN) 0.1 % nasal spray, 2 sprays by Nasal route 2 times daily Use in each nostril as directed, Disp: 120 mL, Rfl: 1    Cranberry-Cholecalciferol 4200-500 MG-UNIT CAPS, Take by mouth, Disp: , Rfl:     calcium carbonate 600 MG TABS tablet, Take 1 tablet by mouth daily, Disp: , Rfl:     Cyanocobalamin (VITAMIN B-12) 1000 MCG SUBL, Place

## 2023-06-05 ENCOUNTER — HOSPITAL ENCOUNTER (OUTPATIENT)
Dept: NEUROLOGY | Age: 73
Discharge: HOME | End: 2023-06-05
Payer: MEDICARE

## 2023-06-05 DIAGNOSIS — R20.0 NUMBNESS AND TINGLING OF BOTH LOWER EXTREMITIES: ICD-10-CM

## 2023-06-05 DIAGNOSIS — R20.2 NUMBNESS AND TINGLING OF BOTH LOWER EXTREMITIES: ICD-10-CM

## 2023-06-05 PROCEDURE — 95912 NRV CNDJ TEST 11-12 STUDIES: CPT

## 2023-06-05 PROCEDURE — 95886 MUSC TEST DONE W/N TEST COMP: CPT

## 2023-06-05 NOTE — PROCEDURES
limits. F waves of the peroneal tibial nerves within normal limits, H reflex normal bilaterally. .    Insertional activity in the lower extremity revealing no evidence of positive waves, fibrillation potentials, or abnormal recruitment pattern. In the first dorsal interosseous of the right lower extremity there was some increased polyphasic units minimally, and increased duration. This could be a normal variant. .    Impression:  Study compatible with followin. Normal nerve conduction studies of the following nerves: Right peroneal, left peroneal, left tibial, superficial peroneal bilaterally, sural bilaterally, medial and lateral plantar branches bilaterally. 2.  Mild decreased velocity of the right tibial nerve, nonspecific etiology. 3.  No diagnostic evidence of a lumbar radiculopathy. Pure sensory radiculopathies cannot be detected by electrodiagnostic technique. Evidence of few positive waves left lumbar paraspinals can be compatible with a posterior rami syndrome. However, no evidence of anterior rami involvement. 4.  No evidence of primary myopathy. Recommendations: Please see letter addressed to Dr. Tiffanie Bush in  patient's chart. Conservative management is appropriate at this point in time addition per history and examination, must consider that she has tendinitis in the right lower extremity causing heel pain as well as possible Baker's cyst right knee. Physical therapy as well as podiatric management should be considered. Thank you    Clinical correlation recommended.         Electronically signed by Noemy Sales MD on [id number] at 3:48 PM

## 2023-06-07 NOTE — TELEPHONE ENCOUNTER
Patient notified and understood instructions. Consent 1/Introductory Paragraph: The rationale for Mohs was explained to the patient and consent was obtained. The risks, benefits and alternatives to therapy were discussed in detail. Specifically, the risks of infection, scarring, bleeding, prolonged wound healing, incomplete removal, allergy to anesthesia, nerve injury and recurrence were addressed. Prior to the procedure, the treatment site was clearly identified and confirmed by the patient. All components of Universal Protocol/PAUSE Rule completed.

## 2023-07-14 DIAGNOSIS — E11.9 DIET-CONTROLLED DIABETES MELLITUS (HCC): ICD-10-CM

## 2023-07-14 DIAGNOSIS — I10 ESSENTIAL HYPERTENSION: Chronic | ICD-10-CM

## 2023-07-14 DIAGNOSIS — E78.2 MIXED HYPERLIPIDEMIA: Chronic | ICD-10-CM

## 2023-07-14 DIAGNOSIS — M81.0 AGE-RELATED OSTEOPOROSIS WITHOUT CURRENT PATHOLOGICAL FRACTURE: ICD-10-CM

## 2023-07-14 DIAGNOSIS — E03.9 ACQUIRED HYPOTHYROIDISM: Chronic | ICD-10-CM

## 2023-07-14 LAB
ALBUMIN SERPL-MCNC: 4.2 G/DL (ref 3.5–5.2)
ALP SERPL-CCNC: 118 U/L (ref 35–104)
ALT SERPL-CCNC: 15 U/L (ref 0–32)
ANION GAP SERPL CALCULATED.3IONS-SCNC: 14 MMOL/L (ref 7–16)
AST SERPL-CCNC: 19 U/L (ref 0–31)
BACTERIA URNS QL MICRO: ABNORMAL /HPF
BASOPHILS # BLD: 0.05 E9/L (ref 0–0.2)
BASOPHILS NFR BLD: 0.8 % (ref 0–2)
BILIRUB SERPL-MCNC: 0.3 MG/DL (ref 0–1.2)
BILIRUB UR QL STRIP: NEGATIVE
BUN SERPL-MCNC: 12 MG/DL (ref 6–23)
CALCIUM SERPL-MCNC: 9.2 MG/DL (ref 8.6–10.2)
CHLORIDE SERPL-SCNC: 104 MMOL/L (ref 98–107)
CHOLESTEROL, TOTAL: 183 MG/DL (ref 0–199)
CLARITY UR: CLEAR
CO2 SERPL-SCNC: 24 MMOL/L (ref 22–29)
COLOR UR: YELLOW
CREAT SERPL-MCNC: 0.8 MG/DL (ref 0.5–1)
EOSINOPHIL # BLD: 0.35 E9/L (ref 0.05–0.5)
EOSINOPHIL NFR BLD: 5.5 % (ref 0–6)
ERYTHROCYTE [DISTWIDTH] IN BLOOD BY AUTOMATED COUNT: 13.4 FL (ref 11.5–15)
GLUCOSE SERPL-MCNC: 97 MG/DL (ref 74–99)
GLUCOSE UR STRIP-MCNC: NEGATIVE MG/DL
HBA1C MFR BLD: 5.9 % (ref 4–5.6)
HCT VFR BLD AUTO: 41.3 % (ref 34–48)
HDLC SERPL-MCNC: 47 MG/DL
HGB BLD-MCNC: 12.8 G/DL (ref 11.5–15.5)
HGB UR QL STRIP: ABNORMAL
IMM GRANULOCYTES # BLD: 0.01 E9/L
IMM GRANULOCYTES NFR BLD: 0.2 % (ref 0–5)
KETONES UR STRIP-MCNC: NEGATIVE MG/DL
LDLC SERPL CALC-MCNC: 109 MG/DL (ref 0–99)
LEUKOCYTE ESTERASE UR QL STRIP: ABNORMAL
LYMPHOCYTES # BLD: 1.58 E9/L (ref 1.5–4)
LYMPHOCYTES NFR BLD: 24.7 % (ref 20–42)
MCH RBC QN AUTO: 27.9 PG (ref 26–35)
MCHC RBC AUTO-ENTMCNC: 31 % (ref 32–34.5)
MCV RBC AUTO: 90.2 FL (ref 80–99.9)
MONOCYTES # BLD: 0.57 E9/L (ref 0.1–0.95)
MONOCYTES NFR BLD: 8.9 % (ref 2–12)
NEUTROPHILS # BLD: 3.84 E9/L (ref 1.8–7.3)
NEUTS SEG NFR BLD: 59.9 % (ref 43–80)
NITRITE UR QL STRIP: NEGATIVE
PH UR STRIP: 6 [PH] (ref 5–9)
PLATELET # BLD AUTO: 272 E9/L (ref 130–450)
PMV BLD AUTO: 11.3 FL (ref 7–12)
POTASSIUM SERPL-SCNC: 4.8 MMOL/L (ref 3.5–5)
PROT SERPL-MCNC: 6.7 G/DL (ref 6.4–8.3)
PROT UR STRIP-MCNC: NEGATIVE MG/DL
RBC # BLD AUTO: 4.58 E12/L (ref 3.5–5.5)
RBC #/AREA URNS HPF: ABNORMAL /HPF (ref 0–2)
SODIUM SERPL-SCNC: 142 MMOL/L (ref 132–146)
SP GR UR STRIP: 1.01 (ref 1–1.03)
T4 SERPL-MCNC: 9.1 MCG/DL (ref 4.5–11.7)
TRIGL SERPL-MCNC: 133 MG/DL (ref 0–149)
TSH SERPL-MCNC: 0.73 UIU/ML (ref 0.27–4.2)
UROBILINOGEN UR STRIP-ACNC: 0.2 E.U./DL
VITAMIN D 25-HYDROXY: 36 NG/ML (ref 30–100)
VLDLC SERPL CALC-MCNC: 27 MG/DL
WBC # BLD: 6.4 E9/L (ref 4.5–11.5)
WBC #/AREA URNS HPF: ABNORMAL /HPF (ref 0–5)

## 2023-07-24 ENCOUNTER — OFFICE VISIT (OUTPATIENT)
Dept: PRIMARY CARE CLINIC | Age: 73
End: 2023-07-24
Payer: MEDICARE

## 2023-07-24 VITALS — BODY MASS INDEX: 29.66 KG/M2 | TEMPERATURE: 98.6 F | WEIGHT: 157 LBS

## 2023-07-24 DIAGNOSIS — M19.071 PRIMARY OSTEOARTHRITIS OF BOTH FEET: ICD-10-CM

## 2023-07-24 DIAGNOSIS — E03.9 ACQUIRED HYPOTHYROIDISM: Primary | Chronic | ICD-10-CM

## 2023-07-24 DIAGNOSIS — M81.0 AGE-RELATED OSTEOPOROSIS WITHOUT CURRENT PATHOLOGICAL FRACTURE: ICD-10-CM

## 2023-07-24 DIAGNOSIS — E11.9 DIET-CONTROLLED DIABETES MELLITUS (HCC): ICD-10-CM

## 2023-07-24 DIAGNOSIS — M25.522 LEFT ELBOW PAIN: ICD-10-CM

## 2023-07-24 DIAGNOSIS — E78.2 MIXED HYPERLIPIDEMIA: Chronic | ICD-10-CM

## 2023-07-24 DIAGNOSIS — I10 ESSENTIAL HYPERTENSION: Chronic | ICD-10-CM

## 2023-07-24 DIAGNOSIS — M19.072 PRIMARY OSTEOARTHRITIS OF BOTH FEET: ICD-10-CM

## 2023-07-24 PROCEDURE — 1036F TOBACCO NON-USER: CPT | Performed by: FAMILY MEDICINE

## 2023-07-24 PROCEDURE — 99214 OFFICE O/P EST MOD 30 MIN: CPT | Performed by: FAMILY MEDICINE

## 2023-07-24 PROCEDURE — G8428 CUR MEDS NOT DOCUMENT: HCPCS | Performed by: FAMILY MEDICINE

## 2023-07-24 PROCEDURE — 3017F COLORECTAL CA SCREEN DOC REV: CPT | Performed by: FAMILY MEDICINE

## 2023-07-24 PROCEDURE — G8399 PT W/DXA RESULTS DOCUMENT: HCPCS | Performed by: FAMILY MEDICINE

## 2023-07-24 PROCEDURE — 1123F ACP DISCUSS/DSCN MKR DOCD: CPT | Performed by: FAMILY MEDICINE

## 2023-07-24 PROCEDURE — 2022F DILAT RTA XM EVC RTNOPTHY: CPT | Performed by: FAMILY MEDICINE

## 2023-07-24 PROCEDURE — 1090F PRES/ABSN URINE INCON ASSESS: CPT | Performed by: FAMILY MEDICINE

## 2023-07-24 PROCEDURE — G8417 CALC BMI ABV UP PARAM F/U: HCPCS | Performed by: FAMILY MEDICINE

## 2023-07-24 PROCEDURE — 3044F HG A1C LEVEL LT 7.0%: CPT | Performed by: FAMILY MEDICINE

## 2023-07-24 ASSESSMENT — ENCOUNTER SYMPTOMS
GASTROINTESTINAL NEGATIVE: 1
ALLERGIC/IMMUNOLOGIC NEGATIVE: 1
RESPIRATORY NEGATIVE: 1
EYES NEGATIVE: 1

## 2023-09-26 ENCOUNTER — OFFICE VISIT (OUTPATIENT)
Dept: PRIMARY CARE CLINIC | Age: 73
End: 2023-09-26
Payer: MEDICARE

## 2023-09-26 DIAGNOSIS — I10 ESSENTIAL HYPERTENSION: ICD-10-CM

## 2023-09-26 DIAGNOSIS — M17.11 PRIMARY OSTEOARTHRITIS OF RIGHT KNEE: Primary | ICD-10-CM

## 2023-09-26 DIAGNOSIS — Z23 IMMUNIZATION DUE: ICD-10-CM

## 2023-09-26 PROCEDURE — 1090F PRES/ABSN URINE INCON ASSESS: CPT | Performed by: FAMILY MEDICINE

## 2023-09-26 PROCEDURE — G8417 CALC BMI ABV UP PARAM F/U: HCPCS | Performed by: FAMILY MEDICINE

## 2023-09-26 PROCEDURE — 1036F TOBACCO NON-USER: CPT | Performed by: FAMILY MEDICINE

## 2023-09-26 PROCEDURE — G0008 ADMIN INFLUENZA VIRUS VAC: HCPCS | Performed by: FAMILY MEDICINE

## 2023-09-26 PROCEDURE — G8428 CUR MEDS NOT DOCUMENT: HCPCS | Performed by: FAMILY MEDICINE

## 2023-09-26 PROCEDURE — 99213 OFFICE O/P EST LOW 20 MIN: CPT | Performed by: FAMILY MEDICINE

## 2023-09-26 PROCEDURE — 3078F DIAST BP <80 MM HG: CPT | Performed by: FAMILY MEDICINE

## 2023-09-26 PROCEDURE — G8399 PT W/DXA RESULTS DOCUMENT: HCPCS | Performed by: FAMILY MEDICINE

## 2023-09-26 PROCEDURE — 3074F SYST BP LT 130 MM HG: CPT | Performed by: FAMILY MEDICINE

## 2023-09-26 PROCEDURE — 3017F COLORECTAL CA SCREEN DOC REV: CPT | Performed by: FAMILY MEDICINE

## 2023-09-26 PROCEDURE — 90694 VACC AIIV4 NO PRSRV 0.5ML IM: CPT | Performed by: FAMILY MEDICINE

## 2023-09-26 PROCEDURE — 1123F ACP DISCUSS/DSCN MKR DOCD: CPT | Performed by: FAMILY MEDICINE

## 2023-09-27 VITALS
BODY MASS INDEX: 29.87 KG/M2 | TEMPERATURE: 98.5 F | SYSTOLIC BLOOD PRESSURE: 132 MMHG | HEIGHT: 61 IN | WEIGHT: 158.2 LBS | DIASTOLIC BLOOD PRESSURE: 78 MMHG

## 2023-09-27 PROBLEM — M17.11 PRIMARY OSTEOARTHRITIS OF RIGHT KNEE: Status: ACTIVE | Noted: 2023-09-27

## 2023-09-27 SDOH — ECONOMIC STABILITY: FOOD INSECURITY: WITHIN THE PAST 12 MONTHS, THE FOOD YOU BOUGHT JUST DIDN'T LAST AND YOU DIDN'T HAVE MONEY TO GET MORE.: NEVER TRUE

## 2023-09-27 SDOH — ECONOMIC STABILITY: FOOD INSECURITY: WITHIN THE PAST 12 MONTHS, YOU WORRIED THAT YOUR FOOD WOULD RUN OUT BEFORE YOU GOT MONEY TO BUY MORE.: NEVER TRUE

## 2023-09-27 SDOH — ECONOMIC STABILITY: HOUSING INSECURITY
IN THE LAST 12 MONTHS, WAS THERE A TIME WHEN YOU DID NOT HAVE A STEADY PLACE TO SLEEP OR SLEPT IN A SHELTER (INCLUDING NOW)?: NO

## 2023-09-27 SDOH — ECONOMIC STABILITY: INCOME INSECURITY: HOW HARD IS IT FOR YOU TO PAY FOR THE VERY BASICS LIKE FOOD, HOUSING, MEDICAL CARE, AND HEATING?: NOT HARD AT ALL

## 2023-09-27 ASSESSMENT — ENCOUNTER SYMPTOMS
GASTROINTESTINAL NEGATIVE: 1
EYES NEGATIVE: 1
RESPIRATORY NEGATIVE: 1
ALLERGIC/IMMUNOLOGIC NEGATIVE: 1

## 2023-09-27 ASSESSMENT — PATIENT HEALTH QUESTIONNAIRE - PHQ9
SUM OF ALL RESPONSES TO PHQ9 QUESTIONS 1 & 2: 0
SUM OF ALL RESPONSES TO PHQ QUESTIONS 1-9: 0
1. LITTLE INTEREST OR PLEASURE IN DOING THINGS: 0
2. FEELING DOWN, DEPRESSED OR HOPELESS: 0
SUM OF ALL RESPONSES TO PHQ QUESTIONS 1-9: 0

## 2023-11-01 ENCOUNTER — TELEPHONE (OUTPATIENT)
Dept: VASCULAR SURGERY | Age: 73
End: 2023-11-01

## 2023-11-01 NOTE — TELEPHONE ENCOUNTER
Received referral from Dr. Angel Haney regarding lymphedema vs venous insufficiency, left message for patient to return call.

## 2023-11-02 ENCOUNTER — TELEPHONE (OUTPATIENT)
Dept: VASCULAR SURGERY | Age: 73
End: 2023-11-02

## 2023-11-02 NOTE — TELEPHONE ENCOUNTER
Second Attempt:  Received referral from Dr. Haley Brenner regarding lymphedema vs venous insufficiency, left message for patient to return call.

## 2023-11-09 ENCOUNTER — TELEPHONE (OUTPATIENT)
Dept: VASCULAR SURGERY | Age: 73
End: 2023-11-09

## 2023-11-09 NOTE — TELEPHONE ENCOUNTER
Final Attempt:  Received referral from Dr. Blake Menon for Dr. Pari Guerra regarding lymphedema vs venous insufficiency, left message for patient to return call. Will send back to Dr. Blake Menon.

## 2023-11-14 ENCOUNTER — TELEPHONE (OUTPATIENT)
Dept: VASCULAR SURGERY | Age: 73
End: 2023-11-14

## 2023-11-14 NOTE — TELEPHONE ENCOUNTER
Patient declined to schedule appointment with Dr. Gisela Maldonado, has already seen Dr. Isac Pedraza for her vascular problem.

## 2023-12-29 ENCOUNTER — OFFICE VISIT (OUTPATIENT)
Dept: PRIMARY CARE CLINIC | Age: 73
End: 2023-12-29

## 2023-12-29 VITALS
OXYGEN SATURATION: 97 % | HEIGHT: 61 IN | WEIGHT: 152 LBS | SYSTOLIC BLOOD PRESSURE: 132 MMHG | BODY MASS INDEX: 28.7 KG/M2 | HEART RATE: 96 BPM | TEMPERATURE: 96.9 F | DIASTOLIC BLOOD PRESSURE: 83 MMHG

## 2023-12-29 DIAGNOSIS — R09.81 SINUS CONGESTION: ICD-10-CM

## 2023-12-29 DIAGNOSIS — J01.80 ACUTE NON-RECURRENT SINUSITIS OF OTHER SINUS: ICD-10-CM

## 2023-12-29 DIAGNOSIS — J10.1 INFLUENZA A: Primary | ICD-10-CM

## 2023-12-29 LAB
INFLUENZA A ANTIBODY: POSITIVE
INFLUENZA B ANTIBODY: NEGATIVE
Lab: NORMAL
PERFORMING INSTRUMENT: NORMAL
QC PASS/FAIL: NORMAL
RSV ANTIGEN: NEGATIVE
SARS-COV-2, POC: NORMAL

## 2023-12-29 RX ORDER — CEFDINIR 300 MG/1
300 CAPSULE ORAL 2 TIMES DAILY
Qty: 14 CAPSULE | Refills: 0 | Status: SHIPPED | OUTPATIENT
Start: 2023-12-29

## 2023-12-29 RX ORDER — OSELTAMIVIR PHOSPHATE 75 MG/1
75 CAPSULE ORAL 2 TIMES DAILY
Qty: 10 CAPSULE | Refills: 0 | Status: SHIPPED | OUTPATIENT
Start: 2023-12-29 | End: 2024-01-03

## 2023-12-29 NOTE — PROGRESS NOTES
cuff.    A great deal of time spent reviewing medications, diet, exercise, social issues. Also reviewing the chart before entering the room with patient and finishing charting after leaving patient's room. More than half of that time was spent face to face with the patient in counseling and coordinating care. I informed patient about the risk associated with noncompliance of medication and taking medications incorrectly. Appropriate follow-up with myself and all specialist.  Encourage family members to take active role in assisting with medications and medical care. If any confusion should develop to notify my office immediately to avoid risk of worsening medical condition      Follow Up: Return if symptoms worsen or fail to improve, for Reg Appt, Meds. If worse go to ER. Not better in 24 hr see.      Seen by:  Jeramie Lechuga, DO

## 2024-01-15 DIAGNOSIS — E11.9 DIET-CONTROLLED DIABETES MELLITUS (HCC): ICD-10-CM

## 2024-01-15 DIAGNOSIS — M81.0 AGE-RELATED OSTEOPOROSIS WITHOUT CURRENT PATHOLOGICAL FRACTURE: ICD-10-CM

## 2024-01-15 DIAGNOSIS — E78.2 MIXED HYPERLIPIDEMIA: Chronic | ICD-10-CM

## 2024-01-15 DIAGNOSIS — E03.9 ACQUIRED HYPOTHYROIDISM: Chronic | ICD-10-CM

## 2024-01-15 DIAGNOSIS — I10 ESSENTIAL HYPERTENSION: Chronic | ICD-10-CM

## 2024-01-15 LAB
ABSOLUTE IMMATURE GRANULOCYTE: 0.04 K/UL (ref 0–0.58)
ALBUMIN SERPL-MCNC: 4 G/DL (ref 3.5–5.2)
ALP BLD-CCNC: 168 U/L (ref 35–104)
ALT SERPL-CCNC: 23 U/L (ref 0–32)
ANION GAP SERPL CALCULATED.3IONS-SCNC: 13 MMOL/L (ref 7–16)
AST SERPL-CCNC: 33 U/L (ref 0–31)
BACTERIA: ABNORMAL
BASOPHILS ABSOLUTE: 0.06 K/UL (ref 0–0.2)
BASOPHILS RELATIVE PERCENT: 1 % (ref 0–2)
BILIRUB SERPL-MCNC: 0.5 MG/DL (ref 0–1.2)
BILIRUBIN URINE: NEGATIVE
BUN BLDV-MCNC: 15 MG/DL (ref 6–23)
CALCIUM SERPL-MCNC: 9.4 MG/DL (ref 8.6–10.2)
CASTS UA: ABNORMAL /LPF
CHLORIDE BLD-SCNC: 104 MMOL/L (ref 98–107)
CHOLESTEROL: 224 MG/DL
CO2: 27 MMOL/L (ref 22–29)
COLOR: YELLOW
CREAT SERPL-MCNC: 0.9 MG/DL (ref 0.5–1)
EOSINOPHILS ABSOLUTE: 0.27 K/UL (ref 0.05–0.5)
EOSINOPHILS RELATIVE PERCENT: 4 % (ref 0–6)
EPITHELIAL CELLS UA: ABNORMAL /HPF
GFR SERPL CREATININE-BSD FRML MDRD: >60 ML/MIN/1.73M2
GLUCOSE BLD-MCNC: 102 MG/DL (ref 74–99)
GLUCOSE URINE: NEGATIVE MG/DL
HBA1C MFR BLD: 6.3 % (ref 4–5.6)
HCT VFR BLD CALC: 43.4 % (ref 34–48)
HDLC SERPL-MCNC: 53 MG/DL
HEMOGLOBIN: 13.8 G/DL (ref 11.5–15.5)
IMMATURE GRANULOCYTES: 1 % (ref 0–5)
KETONES, URINE: ABNORMAL MG/DL
LDL CHOLESTEROL: 144 MG/DL
LEUKOCYTE ESTERASE, URINE: ABNORMAL
LYMPHOCYTES ABSOLUTE: 1.74 K/UL (ref 1.5–4)
LYMPHOCYTES RELATIVE PERCENT: 26 % (ref 20–42)
MCH RBC QN AUTO: 28.5 PG (ref 26–35)
MCHC RBC AUTO-ENTMCNC: 31.8 G/DL (ref 32–34.5)
MCV RBC AUTO: 89.7 FL (ref 80–99.9)
MONOCYTES ABSOLUTE: 0.68 K/UL (ref 0.1–0.95)
MONOCYTES RELATIVE PERCENT: 10 % (ref 2–12)
MUCUS: PRESENT
NEUTROPHILS ABSOLUTE: 4 K/UL (ref 1.8–7.3)
NEUTROPHILS RELATIVE PERCENT: 59 % (ref 43–80)
NITRITE, URINE: NEGATIVE
PDW BLD-RTO: 13.8 % (ref 11.5–15)
PH UA: 6.5 (ref 5–9)
PLATELET # BLD: 325 K/UL (ref 130–450)
PMV BLD AUTO: 11.1 FL (ref 7–12)
POTASSIUM SERPL-SCNC: 5.8 MMOL/L (ref 3.5–5)
PROTEIN UA: 30 MG/DL
RBC # BLD: 4.84 M/UL (ref 3.5–5.5)
RBC UA: ABNORMAL /HPF
SODIUM BLD-SCNC: 144 MMOL/L (ref 132–146)
SPECIFIC GRAVITY UA: 1.02 (ref 1–1.03)
T4 TOTAL: 9.3 UG/DL (ref 4.5–11.7)
TOTAL PROTEIN: 6.8 G/DL (ref 6.4–8.3)
TRIGL SERPL-MCNC: 135 MG/DL
TSH SERPL DL<=0.05 MIU/L-ACNC: 0.32 UIU/ML (ref 0.27–4.2)
TURBIDITY: CLEAR
URINE HGB: NEGATIVE
UROBILINOGEN, URINE: 0.2 EU/DL (ref 0–1)
VITAMIN D 25-HYDROXY: 47.3 NG/ML (ref 30–100)
VLDLC SERPL CALC-MCNC: 27 MG/DL
WBC # BLD: 6.8 K/UL (ref 4.5–11.5)
WBC UA: ABNORMAL /HPF

## 2024-01-24 ENCOUNTER — OFFICE VISIT (OUTPATIENT)
Dept: PRIMARY CARE CLINIC | Age: 74
End: 2024-01-24

## 2024-01-24 VITALS
WEIGHT: 156.4 LBS | HEART RATE: 73 BPM | HEIGHT: 60 IN | OXYGEN SATURATION: 97 % | TEMPERATURE: 97.5 F | RESPIRATION RATE: 17 BRPM | BODY MASS INDEX: 30.7 KG/M2

## 2024-01-24 DIAGNOSIS — Z00.00 MEDICARE ANNUAL WELLNESS VISIT, SUBSEQUENT: Primary | ICD-10-CM

## 2024-01-24 DIAGNOSIS — E03.9 ACQUIRED HYPOTHYROIDISM: Chronic | ICD-10-CM

## 2024-01-24 DIAGNOSIS — J01.80 ACUTE NON-RECURRENT SINUSITIS OF OTHER SINUS: ICD-10-CM

## 2024-01-24 DIAGNOSIS — E78.2 MIXED HYPERLIPIDEMIA: Chronic | ICD-10-CM

## 2024-01-24 DIAGNOSIS — M81.0 AGE-RELATED OSTEOPOROSIS WITHOUT CURRENT PATHOLOGICAL FRACTURE: ICD-10-CM

## 2024-01-24 DIAGNOSIS — E53.8 VITAMIN B 12 DEFICIENCY: ICD-10-CM

## 2024-01-24 DIAGNOSIS — F41.9 ANXIETY: Chronic | ICD-10-CM

## 2024-01-24 DIAGNOSIS — I10 ESSENTIAL HYPERTENSION: Chronic | ICD-10-CM

## 2024-01-24 DIAGNOSIS — E87.5 HYPERKALEMIA: ICD-10-CM

## 2024-01-24 DIAGNOSIS — M17.11 PRIMARY OSTEOARTHRITIS OF RIGHT KNEE: ICD-10-CM

## 2024-01-24 DIAGNOSIS — E11.9 DIET-CONTROLLED DIABETES MELLITUS (HCC): ICD-10-CM

## 2024-01-24 RX ORDER — CEPHALEXIN 500 MG/1
500 CAPSULE ORAL 2 TIMES DAILY
Qty: 14 CAPSULE | Refills: 0 | Status: SHIPPED | OUTPATIENT
Start: 2024-01-24

## 2024-01-24 ASSESSMENT — LIFESTYLE VARIABLES: HOW MANY STANDARD DRINKS CONTAINING ALCOHOL DO YOU HAVE ON A TYPICAL DAY: PATIENT DOES NOT DRINK

## 2024-01-24 NOTE — PATIENT INSTRUCTIONS
Visit Summary for your review.    Other Preventive Recommendations:    A preventive eye exam performed by an eye specialist is recommended every 1-2 years to screen for glaucoma; cataracts, macular degeneration, and other eye disorders.  A preventive dental visit is recommended every 6 months.  Try to get at least 150 minutes of exercise per week or 10,000 steps per day on a pedometer .  Order or download the FREE \"Exercise & Physical Activity: Your Everyday Guide\" from [employer]. Call 1-158.363.1672 or search [employer] online.  You need 1058-8343 mg of calcium and 7486-1794 IU of vitamin D per day. It is possible to meet your calcium requirement with diet alone, but a vitamin D supplement is usually necessary to meet this goal.  When exposed to the sun, use a sunscreen that protects against both UVA and UVB radiation with an SPF of 30 or greater. Reapply every 2 to 3 hours or after sweating, drying off with a towel, or swimming.  Always wear a seat belt when traveling in a car. Always wear a helmet when riding a bicycle or motorcycle.

## 2024-01-24 NOTE — PROGRESS NOTES
97.5 °F (36.4 °C)   TempSrc: Temporal   SpO2: 97%   Weight: 70.9 kg (156 lb 6.4 oz)   Height: 1.524 m (5')      Body mass index is 30.54 kg/m².      General Appearance: alert and oriented to person, place and time, well developed and well- nourished, in no acute distress  Skin: warm and dry, no rash or erythema  Head: normocephalic and atraumatic  Eyes: pupils equal, round, and reactive to light, extraocular eye movements intact, conjunctivae normal  ENT: tympanic membrane, external ear and ear canal normal bilaterally, nose without deformity, nasal mucosa and turbinates normal without polyps  Neck: supple and non-tender without mass, no thyromegaly or thyroid nodules, no cervical lymphadenopathy  Pulmonary/Chest: clear to auscultation bilaterally- no wheezes, rales or rhonchi, normal air movement, no respiratory distress  Cardiovascular: normal rate, regular rhythm, normal S1 and S2, no murmurs, rubs, clicks, or gallops, distal pulses intact, no carotid bruits  Abdomen: soft, non-tender, non-distended, normal bowel sounds, no masses or organomegaly  Extremities: no cyanosis, clubbing or edema  Musculoskeletal: normal range of motion, no joint swelling, deformity or tenderness  Neurologic: reflexes normal and symmetric, no cranial nerve deficit, gait, coordination and speech normal       Allergies   Allergen Reactions    Azithromycin      Stomach pains    Doxycycline      Stomach pains    Zanaflex [Tizanidine]      Stomach pains     Prior to Visit Medications    Medication Sig Taking? Authorizing Provider   cefdinir (OMNICEF) 300 MG capsule Take 1 capsule by mouth 2 times daily  Valeriano Robbins, DO   fluticasone (FLONASE) 50 MCG/ACT nasal spray 1 spray by Each Nostril route 2 times daily  Valeriano Robbins, DO   metoprolol succinate (TOPROL XL) 25 MG extended release tablet TAKE ONE TABLET BY MOUTH TWO TIMES A DAY  Ibrahima Calderón, DO   levothyroxine (SYNTHROID) 88 MCG tablet One four times a week  Valeriano Robbins

## 2024-02-06 ENCOUNTER — OFFICE VISIT (OUTPATIENT)
Dept: PRIMARY CARE CLINIC | Age: 74
End: 2024-02-06
Payer: MEDICARE

## 2024-02-06 VITALS
BODY MASS INDEX: 31.02 KG/M2 | DIASTOLIC BLOOD PRESSURE: 82 MMHG | WEIGHT: 158 LBS | HEIGHT: 60 IN | TEMPERATURE: 98.6 F | SYSTOLIC BLOOD PRESSURE: 135 MMHG

## 2024-02-06 DIAGNOSIS — I10 ESSENTIAL HYPERTENSION: Chronic | ICD-10-CM

## 2024-02-06 DIAGNOSIS — R42 DIZZINESS: ICD-10-CM

## 2024-02-06 DIAGNOSIS — E03.9 ACQUIRED HYPOTHYROIDISM: Chronic | ICD-10-CM

## 2024-02-06 DIAGNOSIS — T78.40XA ALLERGIC REACTION, INITIAL ENCOUNTER: ICD-10-CM

## 2024-02-06 DIAGNOSIS — L30.9 DERMATITIS: Primary | ICD-10-CM

## 2024-02-06 PROCEDURE — 3017F COLORECTAL CA SCREEN DOC REV: CPT | Performed by: FAMILY MEDICINE

## 2024-02-06 PROCEDURE — 3075F SYST BP GE 130 - 139MM HG: CPT | Performed by: FAMILY MEDICINE

## 2024-02-06 PROCEDURE — G8417 CALC BMI ABV UP PARAM F/U: HCPCS | Performed by: FAMILY MEDICINE

## 2024-02-06 PROCEDURE — G8399 PT W/DXA RESULTS DOCUMENT: HCPCS | Performed by: FAMILY MEDICINE

## 2024-02-06 PROCEDURE — 99214 OFFICE O/P EST MOD 30 MIN: CPT | Performed by: FAMILY MEDICINE

## 2024-02-06 PROCEDURE — 1036F TOBACCO NON-USER: CPT | Performed by: FAMILY MEDICINE

## 2024-02-06 PROCEDURE — G8428 CUR MEDS NOT DOCUMENT: HCPCS | Performed by: FAMILY MEDICINE

## 2024-02-06 PROCEDURE — 3079F DIAST BP 80-89 MM HG: CPT | Performed by: FAMILY MEDICINE

## 2024-02-06 PROCEDURE — G8484 FLU IMMUNIZE NO ADMIN: HCPCS | Performed by: FAMILY MEDICINE

## 2024-02-06 PROCEDURE — 1090F PRES/ABSN URINE INCON ASSESS: CPT | Performed by: FAMILY MEDICINE

## 2024-02-06 PROCEDURE — 1123F ACP DISCUSS/DSCN MKR DOCD: CPT | Performed by: FAMILY MEDICINE

## 2024-02-06 RX ORDER — MECLIZINE HCL 12.5 MG/1
TABLET ORAL
Qty: 30 TABLET | Refills: 0 | Status: SHIPPED | OUTPATIENT
Start: 2024-02-06

## 2024-02-06 RX ORDER — LEVOTHYROXINE SODIUM 88 UG/1
TABLET ORAL
Qty: 80 TABLET | Refills: 3 | Status: SHIPPED | OUTPATIENT
Start: 2024-02-06

## 2024-02-06 RX ORDER — PREDNISONE 5 MG/1
5 TABLET ORAL
Qty: 15 TABLET | Refills: 0 | Status: SHIPPED | OUTPATIENT
Start: 2024-02-06 | End: 2024-02-11

## 2024-02-06 SDOH — ECONOMIC STABILITY: INCOME INSECURITY: HOW HARD IS IT FOR YOU TO PAY FOR THE VERY BASICS LIKE FOOD, HOUSING, MEDICAL CARE, AND HEATING?: NOT HARD AT ALL

## 2024-02-06 SDOH — ECONOMIC STABILITY: FOOD INSECURITY: WITHIN THE PAST 12 MONTHS, THE FOOD YOU BOUGHT JUST DIDN'T LAST AND YOU DIDN'T HAVE MONEY TO GET MORE.: NEVER TRUE

## 2024-02-06 SDOH — ECONOMIC STABILITY: FOOD INSECURITY: WITHIN THE PAST 12 MONTHS, YOU WORRIED THAT YOUR FOOD WOULD RUN OUT BEFORE YOU GOT MONEY TO BUY MORE.: NEVER TRUE

## 2024-02-06 ASSESSMENT — ENCOUNTER SYMPTOMS
ALLERGIC/IMMUNOLOGIC NEGATIVE: 1
RESPIRATORY NEGATIVE: 1
EYES NEGATIVE: 1
GASTROINTESTINAL NEGATIVE: 1
ROS SKIN COMMENTS: HPI

## 2024-02-06 ASSESSMENT — PATIENT HEALTH QUESTIONNAIRE - PHQ9
SUM OF ALL RESPONSES TO PHQ9 QUESTIONS 1 & 2: 0
1. LITTLE INTEREST OR PLEASURE IN DOING THINGS: 0
SUM OF ALL RESPONSES TO PHQ QUESTIONS 1-9: 0
2. FEELING DOWN, DEPRESSED OR HOPELESS: 0

## 2024-02-06 NOTE — TELEPHONE ENCOUNTER
Patients last appointment 1/24/2024.  Patients next scheduled appointment   Future Appointments   Date Time Provider Department Center   2/6/2024  1:00 PM Valeriano Robbins DO [facility]   7/24/2024 11:00 AM Valeriano Robbins DO [facility]

## 2024-02-06 NOTE — PROGRESS NOTES
24  Name: Sofia Nunez    : 1950    Sex: female    Age: 73 y.o.        Subjective:  Chief Complaint: Patient is here for burn   Light headed spell fri        She used raw lemon on  upper chest due to blemish and burned and  blsite and  sepe  nex t am  She did   6 d ago  Fri   had lgth headed spell at party  [facility]  fwe zackary a day    and sway to side   no cp or sob  no headache  She feels     ears feels full     worse with chg position  [facility] friday        Review of Systems   Constitutional: Negative.    HENT:          Hpi   Eyes: Negative.    Respiratory: Negative.     Cardiovascular: Negative.    Gastrointestinal: Negative.    Endocrine: Negative.    Genitourinary: Negative.    Musculoskeletal: Negative.    Skin:         hpi   Allergic/Immunologic: Negative.    Neurological: Negative.    Hematological: Negative.    Psychiatric/Behavioral: Negative.           Current Outpatient Medications:     hydrocortisone 2.5 % cream, Apply topically 4  times daily., Disp: 120 g, Rfl: 2    predniSONE (DELTASONE) 5 MG tablet, Take 1 tablet by mouth 3 times daily (with meals) for 5 days, Disp: 15 tablet, Rfl: 0    meclizine (ANTIVERT) 12.5 MG tablet, One  tid prn dizziness, Disp: 30 tablet, Rfl: 0    levothyroxine (SYNTHROID) 88 MCG tablet, One four times a week, Disp: 80 tablet, Rfl: 3    fluticasone (FLONASE) 50 MCG/ACT nasal spray, 1 spray by Each Nostril route 2 times daily, Disp: 1 each, Rfl: 5    metoprolol succinate (TOPROL XL) 25 MG extended release tablet, TAKE ONE TABLET BY MOUTH TWO TIMES A DAY, Disp: 180 tablet, Rfl: 3    FLUoxetine (PROZAC) 10 MG capsule, Take 1 capsule by mouth daily, Disp: 90 capsule, Rfl: 3    levothyroxine (SYNTHROID) 100 MCG tablet, One three times a week, Disp: 50 tablet, Rfl: 12    CRANBERRY EXTRACT PO, Take 500 mg by mouth, Disp: , Rfl:     Cranberry-Cholecalciferol 4200-500 MG-UNIT CAPS, Take by mouth, Disp: , Rfl:     calcium carbonate 600 MG TABS tablet, Take 1 tablet

## 2024-02-07 ENCOUNTER — TELEPHONE (OUTPATIENT)
Dept: ENT CLINIC | Age: 74
End: 2024-02-07

## 2024-02-07 NOTE — TELEPHONE ENCOUNTER
Pt was referred by Dr Robbins for dizziness.  She said that she has been battling a sinus infection, but she has had the dizziness off and on everyday since Friday.  She said that at times she feels like it could make her pass out.  She is scheduled with Aden in April at the first available appt.  Please advise if pt should be seen sooner?

## 2024-02-08 DIAGNOSIS — F41.9 ANXIETY: ICD-10-CM

## 2024-02-08 NOTE — TELEPHONE ENCOUNTER
Patients last appointment 2/6/2024.  Patients next scheduled appointment   Future Appointments   Date Time Provider Department Center   4/5/2024  8:00 AM Aden Wells APRN - CNP [facility]   7/24/2024 11:00 AM Valeriano Robbins, DO [facility]

## 2024-02-08 NOTE — TELEPHONE ENCOUNTER
Spoke to patient regarding apt. She can see PCP for sinus infection. Patient notified she is added on our cancellation list in the meantime. If s/s worsen patient will go to ED. Patient did see her PCP for both sinus/dizziness.

## 2024-02-09 RX ORDER — FLUOXETINE 10 MG/1
10 CAPSULE ORAL DAILY
Qty: 90 CAPSULE | Refills: 3 | Status: SHIPPED | OUTPATIENT
Start: 2024-02-09

## 2024-02-12 ENCOUNTER — TELEPHONE (OUTPATIENT)
Dept: PRIMARY CARE CLINIC | Age: 74
End: 2024-02-12

## 2024-02-12 DIAGNOSIS — G44.59 OTHER COMPLICATED HEADACHE SYNDROME: Primary | ICD-10-CM

## 2024-02-12 DIAGNOSIS — R42 DIZZINESS: ICD-10-CM

## 2024-02-12 NOTE — TELEPHONE ENCOUNTER
Can't get into ENT until April 5th to see nurse practioner , wants to know if you would order an MRI before she goes so she could have it done, c/o being very dizzy now. Would like to have it done at [facility].Please call her cell with reply.

## 2024-02-12 NOTE — TELEPHONE ENCOUNTER
Patient was last seen in office with ZAHRAA Garrison in 2022 patient failed to follow up 12/14/22 patient would like to be scheduled for appointment soon due to dizziness.     Please advise.

## 2024-02-21 DIAGNOSIS — E87.5 HYPERKALEMIA: ICD-10-CM

## 2024-02-21 DIAGNOSIS — I10 ESSENTIAL HYPERTENSION: Chronic | ICD-10-CM

## 2024-02-21 LAB
ALBUMIN SERPL-MCNC: 3.9 G/DL (ref 3.5–5.2)
ALP BLD-CCNC: 117 U/L (ref 35–104)
ALT SERPL-CCNC: 15 U/L (ref 0–32)
ANION GAP SERPL CALCULATED.3IONS-SCNC: 9 MMOL/L (ref 7–16)
AST SERPL-CCNC: 20 U/L (ref 0–31)
BILIRUB SERPL-MCNC: 0.5 MG/DL (ref 0–1.2)
BUN BLDV-MCNC: 14 MG/DL (ref 6–23)
CALCIUM SERPL-MCNC: 9.3 MG/DL (ref 8.6–10.2)
CHLORIDE BLD-SCNC: 103 MMOL/L (ref 98–107)
CO2: 26 MMOL/L (ref 22–29)
CREAT SERPL-MCNC: 0.8 MG/DL (ref 0.5–1)
GFR SERPL CREATININE-BSD FRML MDRD: >60 ML/MIN/1.73M2
GLUCOSE BLD-MCNC: 97 MG/DL (ref 74–99)
POTASSIUM SERPL-SCNC: 5.2 MMOL/L (ref 3.5–5)
SODIUM BLD-SCNC: 138 MMOL/L (ref 132–146)
TOTAL PROTEIN: 6.6 G/DL (ref 6.4–8.3)

## 2024-02-22 ENCOUNTER — HOSPITAL ENCOUNTER (OUTPATIENT)
Dept: MRI IMAGING | Age: 74
Discharge: HOME | End: 2024-02-24
Payer: MEDICARE

## 2024-02-22 ENCOUNTER — TELEPHONE (OUTPATIENT)
Dept: PRIMARY CARE CLINIC | Age: 74
End: 2024-02-22

## 2024-02-22 DIAGNOSIS — G44.59 OTHER COMPLICATED HEADACHE SYNDROME: ICD-10-CM

## 2024-02-22 DIAGNOSIS — R42 DIZZINESS: ICD-10-CM

## 2024-02-22 PROCEDURE — G1010 CDSM STANSON: HCPCS

## 2024-02-22 PROCEDURE — A9579 GAD-BASE MR CONTRAST NOS,1ML: HCPCS | Performed by: RADIOLOGY

## 2024-02-22 PROCEDURE — 6360000004 HC RX CONTRAST MEDICATION: Performed by: RADIOLOGY

## 2024-02-22 RX ADMIN — GADOTERIDOL 14 ML: 279.3 INJECTION, SOLUTION INTRAVENOUS at 11:55

## 2024-02-22 NOTE — TELEPHONE ENCOUNTER
Patient informed. Verbalized understanding with no further questions. Patient is going for MRI today at 11:30: wants this nurse to inform PCP       SANAM

## 2024-02-25 ENCOUNTER — TELEPHONE (OUTPATIENT)
Dept: PRIMARY CARE CLINIC | Age: 74
End: 2024-02-25

## 2024-04-02 DIAGNOSIS — E03.9 ACQUIRED HYPOTHYROIDISM: Chronic | ICD-10-CM

## 2024-04-02 RX ORDER — LEVOTHYROXINE SODIUM 0.1 MG/1
TABLET ORAL
Qty: 50 TABLET | Refills: 12 | Status: SHIPPED | OUTPATIENT
Start: 2024-04-02

## 2024-04-20 ENCOUNTER — OFFICE VISIT (OUTPATIENT)
Dept: PRIMARY CARE CLINIC | Age: 74
End: 2024-04-20

## 2024-04-20 VITALS — TEMPERATURE: 98.2 F | WEIGHT: 157 LBS | BODY MASS INDEX: 30.82 KG/M2 | HEIGHT: 60 IN

## 2024-04-20 DIAGNOSIS — I10 ESSENTIAL HYPERTENSION: Chronic | ICD-10-CM

## 2024-04-20 DIAGNOSIS — N30.00 ACUTE CYSTITIS WITHOUT HEMATURIA: Primary | ICD-10-CM

## 2024-04-20 DIAGNOSIS — H61.23 IMPACTED CERUMEN, BILATERAL: ICD-10-CM

## 2024-04-20 DIAGNOSIS — M79.89 MASS OF SOFT TISSUE OF RIGHT LOWER EXTREMITY: ICD-10-CM

## 2024-04-20 DIAGNOSIS — M17.11 PRIMARY OSTEOARTHRITIS OF RIGHT KNEE: ICD-10-CM

## 2024-04-20 LAB
BILIRUBIN, POC: NORMAL
BLOOD URINE, POC: NORMAL
CLARITY, POC: CLEAR
COLOR, POC: NORMAL
GLUCOSE URINE, POC: NORMAL
KETONES, POC: NORMAL
LEUKOCYTE EST, POC: NORMAL
NITRITE, POC: NORMAL
PH, POC: 6
PROTEIN, POC: NORMAL
SPECIFIC GRAVITY, POC: 1.02
UROBILINOGEN, POC: 0.2

## 2024-04-20 RX ORDER — NITROFURANTOIN 25; 75 MG/1; MG/1
100 CAPSULE ORAL 2 TIMES DAILY
Qty: 14 CAPSULE | Refills: 0 | Status: SHIPPED | OUTPATIENT
Start: 2024-04-20

## 2024-04-20 NOTE — PROGRESS NOTES
24  Name: Sofia Nunez    : 1950    Sex: female    Age: 73 y.o.        Subjective:  Chief Complaint: Patient is here for dysuria.  Right knee pain.  Right thigh over.    Patient presents accompanied by .  Dysuria frequency x 2 days.  UA noted will culture    No temperature chills sweats nausea abdominal or low back pain    Right knee discomfort had shots x 2 with Dr. Singh a little bit to help which she has fullness in the past.  Over the last few years is below the knee medially right leg she says Dr. Delgado has no idea what it is she had fullness and mass area in the right medial thigh approximately the past 20 years.  Does report at times    Ears feel plugged        Review of Systems   Constitutional: Negative.    HENT: Negative.          Ears feel plugged with wax   Eyes: Negative.    Respiratory: Negative.     Cardiovascular: Negative.    Gastrointestinal: Negative.    Endocrine: Negative.    Genitourinary: Negative.    Musculoskeletal:         Right knee pain    See HPI   Skin: Negative.    Allergic/Immunologic: Negative.    Neurological: Negative.    Hematological: Negative.    Psychiatric/Behavioral: Negative.           Current Outpatient Medications:     nitrofurantoin, macrocrystal-monohydrate, (MACROBID) 100 MG capsule, Take 1 capsule by mouth 2 times daily, Disp: 14 capsule, Rfl: 0    levothyroxine (SYNTHROID) 100 MCG tablet, One three times a week, Disp: 50 tablet, Rfl: 12    FLUoxetine (PROZAC) 10 MG capsule, Take 1 capsule by mouth daily, Disp: 90 capsule, Rfl: 3    levothyroxine (SYNTHROID) 88 MCG tablet, One four times a week, Disp: 80 tablet, Rfl: 3    hydrocortisone 2.5 % cream, Apply topically 4  times daily., Disp: 120 g, Rfl: 2    meclizine (ANTIVERT) 12.5 MG tablet, One  tid prn dizziness, Disp: 30 tablet, Rfl: 0    fluticasone (FLONASE) 50 MCG/ACT nasal spray, 1 spray by Each Nostril route 2 times daily, Disp: 1 each, Rfl: 5    metoprolol succinate (TOPROL XL) 25 MG

## 2024-04-21 PROBLEM — H61.23 IMPACTED CERUMEN, BILATERAL: Status: ACTIVE | Noted: 2024-04-21

## 2024-04-21 PROBLEM — M79.89 MASS OF SOFT TISSUE OF RIGHT LOWER EXTREMITY: Status: ACTIVE | Noted: 2024-04-21

## 2024-04-22 LAB
CULTURE: NORMAL
CULTURE: NORMAL
SPECIMEN DESCRIPTION: NORMAL

## 2024-04-22 RX ORDER — METOPROLOL SUCCINATE 25 MG/1
TABLET, EXTENDED RELEASE ORAL
Qty: 180 TABLET | Refills: 1 | Status: SHIPPED | OUTPATIENT
Start: 2024-04-22

## 2024-05-03 ENCOUNTER — HOSPITAL ENCOUNTER (OUTPATIENT)
Dept: MRI IMAGING | Age: 74
End: 2024-05-03
Payer: MEDICARE

## 2024-05-03 DIAGNOSIS — M17.11 PRIMARY OSTEOARTHRITIS OF RIGHT KNEE: ICD-10-CM

## 2024-05-03 DIAGNOSIS — M79.89 MASS OF SOFT TISSUE OF RIGHT LOWER EXTREMITY: ICD-10-CM

## 2024-05-03 PROCEDURE — 73721 MRI JNT OF LWR EXTRE W/O DYE: CPT

## 2024-05-03 PROCEDURE — 73718 MRI LOWER EXTREMITY W/O DYE: CPT

## 2024-05-07 NOTE — RESULT ENCOUNTER NOTE
Notify patient MRI of the knee does show some arthritis and narrowing.  She should follow-up with an orthopedic doctor regarding that result.  She saw Dr. Billingsley in the past.  I encouraged her to follow-up with him.  Hip MRI is okay.  Nothing seen in that swollen area that she describes.

## 2024-06-03 RX ORDER — FLUTICASONE PROPIONATE 50 MCG
1 SPRAY, SUSPENSION (ML) NASAL 2 TIMES DAILY
Qty: 1 EACH | Refills: 5 | Status: SHIPPED | OUTPATIENT
Start: 2024-06-03

## 2024-07-10 DIAGNOSIS — M81.0 AGE-RELATED OSTEOPOROSIS WITHOUT CURRENT PATHOLOGICAL FRACTURE: ICD-10-CM

## 2024-07-10 DIAGNOSIS — E03.9 ACQUIRED HYPOTHYROIDISM: Chronic | ICD-10-CM

## 2024-07-10 DIAGNOSIS — E78.2 MIXED HYPERLIPIDEMIA: Chronic | ICD-10-CM

## 2024-07-10 DIAGNOSIS — I10 ESSENTIAL HYPERTENSION: Chronic | ICD-10-CM

## 2024-07-10 DIAGNOSIS — E53.8 VITAMIN B 12 DEFICIENCY: ICD-10-CM

## 2024-07-10 DIAGNOSIS — E11.9 DIET-CONTROLLED DIABETES MELLITUS (HCC): ICD-10-CM

## 2024-07-10 LAB
ALBUMIN: 3.9 G/DL (ref 3.5–5.2)
ALP BLD-CCNC: 161 U/L (ref 35–104)
ALT SERPL-CCNC: 23 U/L (ref 0–32)
ANION GAP SERPL CALCULATED.3IONS-SCNC: 9 MMOL/L (ref 7–16)
AST SERPL-CCNC: 31 U/L (ref 0–31)
BASOPHILS ABSOLUTE: 0.06 K/UL (ref 0–0.2)
BASOPHILS RELATIVE PERCENT: 1 % (ref 0–2)
BILIRUB SERPL-MCNC: 0.5 MG/DL (ref 0–1.2)
BILIRUBIN, URINE: ABNORMAL
BUN BLDV-MCNC: 10 MG/DL (ref 6–23)
CALCIUM SERPL-MCNC: 9.1 MG/DL (ref 8.6–10.2)
CHLORIDE BLD-SCNC: 102 MMOL/L (ref 98–107)
CHOLESTEROL, TOTAL: 214 MG/DL
CO2: 27 MMOL/L (ref 22–29)
COLOR, UA: YELLOW
CREAT SERPL-MCNC: 0.9 MG/DL (ref 0.5–1)
EOSINOPHILS ABSOLUTE: 0.25 K/UL (ref 0.05–0.5)
EOSINOPHILS RELATIVE PERCENT: 5 % (ref 0–6)
GFR, ESTIMATED: 67 ML/MIN/1.73M2
GLUCOSE BLD-MCNC: 104 MG/DL (ref 74–99)
GLUCOSE URINE: NEGATIVE MG/DL
HBA1C MFR BLD: 6.1 % (ref 4–5.6)
HCT VFR BLD CALC: 40.4 % (ref 34–48)
HDLC SERPL-MCNC: 56 MG/DL
HEMOGLOBIN: 12.8 G/DL (ref 11.5–15.5)
IMMATURE GRANULOCYTES %: 1 % (ref 0–5)
IMMATURE GRANULOCYTES ABSOLUTE: 0.03 K/UL (ref 0–0.58)
KETONES, URINE: ABNORMAL MG/DL
LDL CHOLESTEROL: 135 MG/DL
LEUKOCYTE ESTERASE, URINE: ABNORMAL
LYMPHOCYTES ABSOLUTE: 1.65 K/UL (ref 1.5–4)
LYMPHOCYTES RELATIVE PERCENT: 30 % (ref 20–42)
MCH RBC QN AUTO: 28 PG (ref 26–35)
MCHC RBC AUTO-ENTMCNC: 31.7 G/DL (ref 32–34.5)
MCV RBC AUTO: 88.4 FL (ref 80–99.9)
MONOCYTES ABSOLUTE: 0.73 K/UL (ref 0.1–0.95)
MONOCYTES RELATIVE PERCENT: 13 % (ref 2–12)
NEUTROPHILS ABSOLUTE: 2.81 K/UL (ref 1.8–7.3)
NEUTROPHILS RELATIVE PERCENT: 51 % (ref 43–80)
NITRITE, URINE: NEGATIVE
PDW BLD-RTO: 14.2 % (ref 11.5–15)
PH, URINE: 6 (ref 5–9)
PLATELET # BLD: 290 K/UL (ref 130–450)
PMV BLD AUTO: 11.7 FL (ref 7–12)
POTASSIUM SERPL-SCNC: 5.1 MMOL/L (ref 3.5–5)
PROTEIN UA: ABNORMAL MG/DL
RBC # BLD: 4.57 M/UL (ref 3.5–5.5)
RBC UA: ABNORMAL /HPF
SODIUM BLD-SCNC: 138 MMOL/L (ref 132–146)
SPECIFIC GRAVITY UA: 1.02 (ref 1–1.03)
THYROXINE (T4): 9.8 UG/DL (ref 4.5–11.7)
TOTAL PROTEIN: 6.8 G/DL (ref 6.4–8.3)
TRIGL SERPL-MCNC: 115 MG/DL
TSH SERPL DL<=0.05 MIU/L-ACNC: 2.01 UIU/ML (ref 0.27–4.2)
TURBIDITY: CLEAR
URINE HGB: ABNORMAL
UROBILINOGEN, URINE: 1 EU/DL (ref 0–1)
VITAMIN B-12: 470 PG/ML (ref 211–946)
VITAMIN D 25-HYDROXY: 35.6 NG/ML (ref 30–100)
VLDLC SERPL CALC-MCNC: 23 MG/DL
WBC # BLD: 5.5 K/UL (ref 4.5–11.5)
WBC UA: ABNORMAL /HPF

## 2024-07-24 ENCOUNTER — OFFICE VISIT (OUTPATIENT)
Dept: PRIMARY CARE CLINIC | Age: 74
End: 2024-07-24
Payer: MEDICARE

## 2024-07-24 VITALS
OXYGEN SATURATION: 96 % | RESPIRATION RATE: 17 BRPM | WEIGHT: 158.4 LBS | DIASTOLIC BLOOD PRESSURE: 82 MMHG | SYSTOLIC BLOOD PRESSURE: 134 MMHG | TEMPERATURE: 97.9 F | BODY MASS INDEX: 30.94 KG/M2 | HEART RATE: 71 BPM

## 2024-07-24 DIAGNOSIS — E03.9 ACQUIRED HYPOTHYROIDISM: Chronic | ICD-10-CM

## 2024-07-24 DIAGNOSIS — E11.9 DIET-CONTROLLED DIABETES MELLITUS (HCC): ICD-10-CM

## 2024-07-24 DIAGNOSIS — M17.12 PRIMARY OSTEOARTHRITIS OF LEFT KNEE: Chronic | ICD-10-CM

## 2024-07-24 DIAGNOSIS — I10 ESSENTIAL HYPERTENSION: Primary | Chronic | ICD-10-CM

## 2024-07-24 DIAGNOSIS — E53.8 VITAMIN B 12 DEFICIENCY: ICD-10-CM

## 2024-07-24 DIAGNOSIS — N30.00 ACUTE CYSTITIS WITHOUT HEMATURIA: ICD-10-CM

## 2024-07-24 DIAGNOSIS — M81.0 AGE-RELATED OSTEOPOROSIS WITHOUT CURRENT PATHOLOGICAL FRACTURE: ICD-10-CM

## 2024-07-24 PROCEDURE — 3044F HG A1C LEVEL LT 7.0%: CPT | Performed by: FAMILY MEDICINE

## 2024-07-24 PROCEDURE — 99214 OFFICE O/P EST MOD 30 MIN: CPT | Performed by: FAMILY MEDICINE

## 2024-07-24 PROCEDURE — 3079F DIAST BP 80-89 MM HG: CPT | Performed by: FAMILY MEDICINE

## 2024-07-24 PROCEDURE — G8399 PT W/DXA RESULTS DOCUMENT: HCPCS | Performed by: FAMILY MEDICINE

## 2024-07-24 PROCEDURE — G8428 CUR MEDS NOT DOCUMENT: HCPCS | Performed by: FAMILY MEDICINE

## 2024-07-24 PROCEDURE — G8417 CALC BMI ABV UP PARAM F/U: HCPCS | Performed by: FAMILY MEDICINE

## 2024-07-24 PROCEDURE — 3075F SYST BP GE 130 - 139MM HG: CPT | Performed by: FAMILY MEDICINE

## 2024-07-24 PROCEDURE — 1036F TOBACCO NON-USER: CPT | Performed by: FAMILY MEDICINE

## 2024-07-24 PROCEDURE — 1123F ACP DISCUSS/DSCN MKR DOCD: CPT | Performed by: FAMILY MEDICINE

## 2024-07-24 PROCEDURE — 1090F PRES/ABSN URINE INCON ASSESS: CPT | Performed by: FAMILY MEDICINE

## 2024-07-24 ASSESSMENT — ENCOUNTER SYMPTOMS
ALLERGIC/IMMUNOLOGIC NEGATIVE: 1
GASTROINTESTINAL NEGATIVE: 1
EYES NEGATIVE: 1
RESPIRATORY NEGATIVE: 1

## 2024-07-24 NOTE — PROGRESS NOTES
24  Name: Sofia Nunez    : 1950    Sex: female    Age: 74 y.o.        Subjective:  Chief Complaint: Patient is here for   6 mo ck  re  bp chol  thryoid    throid     anx           Feel ok three sthos left knee  and not  better   at dr reeder  she nwt to [facility] to  denise infante  Lab ntoe auraadn bs down            Review of Systems   Constitutional: Negative.    HENT: Negative.     Eyes: Negative.    Respiratory: Negative.     Cardiovascular: Negative.    Gastrointestinal: Negative.    Endocrine: Negative.    Genitourinary: Negative.    Musculoskeletal:  Positive for arthralgias.   Skin: Negative.    Allergic/Immunologic: Negative.    Neurological: Negative.    Hematological: Negative.    Psychiatric/Behavioral: Negative.           Current Outpatient Medications:     fluticasone (FLONASE) 50 MCG/ACT nasal spray, 1 spray by Each Nostril route 2 times daily, Disp: 1 each, Rfl: 5    metoprolol succinate (TOPROL XL) 25 MG extended release tablet, TAKE ONE TABLET BY MOUTH TWO TIMES A DAY, Disp: 180 tablet, Rfl: 1    nitrofurantoin, macrocrystal-monohydrate, (MACROBID) 100 MG capsule, Take 1 capsule by mouth 2 times daily, Disp: 14 capsule, Rfl: 0    levothyroxine (SYNTHROID) 100 MCG tablet, One three times a week, Disp: 50 tablet, Rfl: 12    FLUoxetine (PROZAC) 10 MG capsule, Take 1 capsule by mouth daily, Disp: 90 capsule, Rfl: 3    levothyroxine (SYNTHROID) 88 MCG tablet, One four times a week, Disp: 80 tablet, Rfl: 3    hydrocortisone 2.5 % cream, Apply topically 4  times daily., Disp: 120 g, Rfl: 2    meclizine (ANTIVERT) 12.5 MG tablet, One  tid prn dizziness, Disp: 30 tablet, Rfl: 0    CRANBERRY EXTRACT PO, Take 500 mg by mouth, Disp: , Rfl:     Cranberry-Cholecalciferol 4200-500 MG-UNIT CAPS, Take by mouth, Disp: , Rfl:     calcium carbonate 600 MG TABS tablet, Take 1 tablet by mouth daily, Disp: , Rfl:     Cyanocobalamin (VITAMIN B-12) 1000 MCG SUBL, Place under the tongue,

## 2024-09-09 ENCOUNTER — OFFICE VISIT (OUTPATIENT)
Dept: PRIMARY CARE CLINIC | Age: 74
End: 2024-09-09
Payer: MEDICARE

## 2024-09-09 VITALS
RESPIRATION RATE: 17 BRPM | TEMPERATURE: 97.9 F | OXYGEN SATURATION: 94 % | BODY MASS INDEX: 31.37 KG/M2 | HEART RATE: 74 BPM | WEIGHT: 160.6 LBS | SYSTOLIC BLOOD PRESSURE: 136 MMHG | DIASTOLIC BLOOD PRESSURE: 82 MMHG

## 2024-09-09 DIAGNOSIS — Z01.810 PRE-OPERATIVE CARDIOVASCULAR EXAMINATION: ICD-10-CM

## 2024-09-09 DIAGNOSIS — I10 ESSENTIAL HYPERTENSION: Chronic | ICD-10-CM

## 2024-09-09 DIAGNOSIS — M17.11 PRIMARY OSTEOARTHRITIS OF RIGHT KNEE: ICD-10-CM

## 2024-09-09 DIAGNOSIS — Z01.812 PRE-OPERATIVE LABORATORY EXAMINATION: ICD-10-CM

## 2024-09-09 DIAGNOSIS — Z01.810 PRE-OPERATIVE CARDIOVASCULAR EXAMINATION: Primary | ICD-10-CM

## 2024-09-09 DIAGNOSIS — E11.9 DIET-CONTROLLED DIABETES MELLITUS (HCC): ICD-10-CM

## 2024-09-09 LAB
ALBUMIN: 4.1 G/DL (ref 3.5–5.2)
ALP BLD-CCNC: 112 U/L (ref 35–104)
ALT SERPL-CCNC: 11 U/L (ref 0–32)
ANION GAP SERPL CALCULATED.3IONS-SCNC: 11 MMOL/L (ref 7–16)
AST SERPL-CCNC: 21 U/L (ref 0–31)
BASOPHILS ABSOLUTE: 0.09 K/UL (ref 0–0.2)
BASOPHILS RELATIVE PERCENT: 1 % (ref 0–2)
BILIRUB SERPL-MCNC: 0.4 MG/DL (ref 0–1.2)
BUN BLDV-MCNC: 15 MG/DL (ref 6–23)
CALCIUM SERPL-MCNC: 9.5 MG/DL (ref 8.6–10.2)
CHLORIDE BLD-SCNC: 102 MMOL/L (ref 98–107)
CO2: 26 MMOL/L (ref 22–29)
CREAT SERPL-MCNC: 1 MG/DL (ref 0.5–1)
EOSINOPHILS ABSOLUTE: 0.36 K/UL (ref 0.05–0.5)
EOSINOPHILS RELATIVE PERCENT: 4 % (ref 0–6)
GFR, ESTIMATED: 62 ML/MIN/1.73M2
GLUCOSE BLD-MCNC: 112 MG/DL (ref 74–99)
HCT VFR BLD CALC: 42.2 % (ref 34–48)
HEMOGLOBIN: 13.3 G/DL (ref 11.5–15.5)
IMMATURE GRANULOCYTES %: 1 % (ref 0–5)
IMMATURE GRANULOCYTES ABSOLUTE: 0.04 K/UL (ref 0–0.58)
LYMPHOCYTES ABSOLUTE: 2.3 K/UL (ref 1.5–4)
LYMPHOCYTES RELATIVE PERCENT: 26 % (ref 20–42)
MCH RBC QN AUTO: 29.2 PG (ref 26–35)
MCHC RBC AUTO-ENTMCNC: 31.5 G/DL (ref 32–34.5)
MCV RBC AUTO: 92.7 FL (ref 80–99.9)
MONOCYTES ABSOLUTE: 0.88 K/UL (ref 0.1–0.95)
MONOCYTES RELATIVE PERCENT: 10 % (ref 2–12)
NEUTROPHILS ABSOLUTE: 5.16 K/UL (ref 1.8–7.3)
NEUTROPHILS RELATIVE PERCENT: 58 % (ref 43–80)
PDW BLD-RTO: 14.1 % (ref 11.5–15)
PLATELET # BLD: 311 K/UL (ref 130–450)
PMV BLD AUTO: 11.5 FL (ref 7–12)
POTASSIUM SERPL-SCNC: 5.1 MMOL/L (ref 3.5–5)
RBC # BLD: 4.55 M/UL (ref 3.5–5.5)
SODIUM BLD-SCNC: 139 MMOL/L (ref 132–146)
TOTAL PROTEIN: 7.1 G/DL (ref 6.4–8.3)
WBC # BLD: 8.8 K/UL (ref 4.5–11.5)

## 2024-09-09 PROCEDURE — 1090F PRES/ABSN URINE INCON ASSESS: CPT | Performed by: FAMILY MEDICINE

## 2024-09-09 PROCEDURE — 1036F TOBACCO NON-USER: CPT | Performed by: FAMILY MEDICINE

## 2024-09-09 PROCEDURE — 99214 OFFICE O/P EST MOD 30 MIN: CPT | Performed by: FAMILY MEDICINE

## 2024-09-09 PROCEDURE — 3044F HG A1C LEVEL LT 7.0%: CPT | Performed by: FAMILY MEDICINE

## 2024-09-09 PROCEDURE — 1123F ACP DISCUSS/DSCN MKR DOCD: CPT | Performed by: FAMILY MEDICINE

## 2024-09-09 PROCEDURE — G8399 PT W/DXA RESULTS DOCUMENT: HCPCS | Performed by: FAMILY MEDICINE

## 2024-09-09 PROCEDURE — 3075F SYST BP GE 130 - 139MM HG: CPT | Performed by: FAMILY MEDICINE

## 2024-09-09 PROCEDURE — 3079F DIAST BP 80-89 MM HG: CPT | Performed by: FAMILY MEDICINE

## 2024-09-09 PROCEDURE — G8428 CUR MEDS NOT DOCUMENT: HCPCS | Performed by: FAMILY MEDICINE

## 2024-09-09 PROCEDURE — G8417 CALC BMI ABV UP PARAM F/U: HCPCS | Performed by: FAMILY MEDICINE

## 2024-09-09 SDOH — ECONOMIC STABILITY: INCOME INSECURITY: HOW HARD IS IT FOR YOU TO PAY FOR THE VERY BASICS LIKE FOOD, HOUSING, MEDICAL CARE, AND HEATING?: NOT HARD AT ALL

## 2024-09-09 SDOH — ECONOMIC STABILITY: FOOD INSECURITY: WITHIN THE PAST 12 MONTHS, YOU WORRIED THAT YOUR FOOD WOULD RUN OUT BEFORE YOU GOT MONEY TO BUY MORE.: NEVER TRUE

## 2024-09-09 SDOH — ECONOMIC STABILITY: FOOD INSECURITY: WITHIN THE PAST 12 MONTHS, THE FOOD YOU BOUGHT JUST DIDN'T LAST AND YOU DIDN'T HAVE MONEY TO GET MORE.: NEVER TRUE

## 2024-09-09 ASSESSMENT — PATIENT HEALTH QUESTIONNAIRE - PHQ9
SUM OF ALL RESPONSES TO PHQ QUESTIONS 1-9: 0
SUM OF ALL RESPONSES TO PHQ QUESTIONS 1-9: 0
1. LITTLE INTEREST OR PLEASURE IN DOING THINGS: NOT AT ALL
SUM OF ALL RESPONSES TO PHQ9 QUESTIONS 1 & 2: 0
SUM OF ALL RESPONSES TO PHQ QUESTIONS 1-9: 0
2. FEELING DOWN, DEPRESSED OR HOPELESS: NOT AT ALL
SUM OF ALL RESPONSES TO PHQ QUESTIONS 1-9: 0

## 2024-09-13 ENCOUNTER — OFFICE VISIT (OUTPATIENT)
Dept: CARDIOLOGY CLINIC | Age: 74
End: 2024-09-13
Payer: MEDICARE

## 2024-09-13 VITALS
DIASTOLIC BLOOD PRESSURE: 78 MMHG | BODY MASS INDEX: 31.71 KG/M2 | HEIGHT: 60 IN | SYSTOLIC BLOOD PRESSURE: 136 MMHG | RESPIRATION RATE: 18 BRPM | HEART RATE: 60 BPM | WEIGHT: 161.5 LBS

## 2024-09-13 DIAGNOSIS — R00.2 PALPITATIONS: Primary | Chronic | ICD-10-CM

## 2024-09-13 PROCEDURE — 3075F SYST BP GE 130 - 139MM HG: CPT | Performed by: INTERNAL MEDICINE

## 2024-09-13 PROCEDURE — 1036F TOBACCO NON-USER: CPT | Performed by: INTERNAL MEDICINE

## 2024-09-13 PROCEDURE — 93000 ELECTROCARDIOGRAM COMPLETE: CPT | Performed by: INTERNAL MEDICINE

## 2024-09-13 PROCEDURE — 99214 OFFICE O/P EST MOD 30 MIN: CPT | Performed by: INTERNAL MEDICINE

## 2024-09-13 PROCEDURE — 1123F ACP DISCUSS/DSCN MKR DOCD: CPT | Performed by: INTERNAL MEDICINE

## 2024-09-13 PROCEDURE — G8427 DOCREV CUR MEDS BY ELIG CLIN: HCPCS | Performed by: INTERNAL MEDICINE

## 2024-09-13 PROCEDURE — 1090F PRES/ABSN URINE INCON ASSESS: CPT | Performed by: INTERNAL MEDICINE

## 2024-09-13 PROCEDURE — 3078F DIAST BP <80 MM HG: CPT | Performed by: INTERNAL MEDICINE

## 2024-09-13 PROCEDURE — G8417 CALC BMI ABV UP PARAM F/U: HCPCS | Performed by: INTERNAL MEDICINE

## 2024-09-13 PROCEDURE — G8399 PT W/DXA RESULTS DOCUMENT: HCPCS | Performed by: INTERNAL MEDICINE

## 2024-09-13 RX ORDER — MULTIVITAMIN WITH IRON
1 TABLET ORAL DAILY
COMMUNITY

## 2024-09-14 ASSESSMENT — KOOS JR
RISING FROM SITTING: MODERATE
TWISING OR PIVOTING ON KNEE: MODERATE
KOOS JR TOTAL INTERVAL SCORE: 50.012
STRAIGHTENING KNEE FULLY: MODERATE
BENDING TO THE FLOOR TO PICK UP OBJECT: MODERATE
STANDING UPRIGHT: MODERATE
HOW SEVERE IS YOUR KNEE STIFFNESS AFTER FIRST WAKING IN MORNING: MODERATE
GOING UP OR DOWN STAIRS: SEVERE

## 2024-09-14 ASSESSMENT — PROMIS GLOBAL HEALTH SCALE
IN GENERAL, WOULD YOU SAY YOUR HEALTH IS...[ON A SCALE OF 1 (POOR) TO 5 (EXCELLENT)]: VERY GOOD
IN GENERAL, PLEASE RATE HOW WELL YOU CARRY OUT YOUR USUAL SOCIAL ACTIVITIES (INCLUDES ACTIVITIES AT HOME, AT WORK, AND IN YOUR COMMUNITY, AND RESPONSIBILITIES AS A PARENT, CHILD, SPOUSE, EMPLOYEE, FRIEND, ETC) [ON A SCALE OF 1 (POOR) TO 5 (EXCELLENT)]?: FAIR
HOW IS THE PROMIS V1.1 BEING ADMINISTERED?: ELECTRONIC
SUM OF RESPONSES TO QUESTIONS 3, 6, 7, & 8: 13
IN THE PAST 7 DAYS, HOW WOULD YOU RATE YOUR PAIN ON AVERAGE [ON A SCALE FROM 0 (NO PAIN) TO 10 (WORST IMAGINABLE PAIN)]?: 5
IN THE PAST 7 DAYS, HOW OFTEN HAVE YOU BEEN BOTHERED BY EMOTIONAL PROBLEMS, SUCH AS FEELING ANXIOUS, DEPRESSED, OR IRRITABLE [ON A SCALE FROM 1 (NEVER) TO 5 (ALWAYS)]?: SOMETIMES
IN GENERAL, HOW WOULD YOU RATE YOUR PHYSICAL HEALTH [ON A SCALE OF 1 (POOR) TO 5 (EXCELLENT)]?: GOOD
IN GENERAL, WOULD YOU SAY YOUR QUALITY OF LIFE IS...[ON A SCALE OF 1 (POOR) TO 5 (EXCELLENT)]: FAIR
IN GENERAL, HOW WOULD YOU RATE YOUR MENTAL HEALTH, INCLUDING YOUR MOOD AND YOUR ABILITY TO THINK [ON A SCALE OF 1 (POOR) TO 5 (EXCELLENT)]?: VERY GOOD
WHO IS THE PERSON COMPLETING THE PROMIS V1.1 SURVEY?: SELF
SUM OF RESPONSES TO QUESTIONS 2, 4, 5, & 10: 12
TO WHAT EXTENT ARE YOU ABLE TO CARRY OUT YOUR EVERYDAY PHYSICAL ACTIVITIES SUCH AS WALKING, CLIMBING STAIRS, CARRYING GROCERIES, OR MOVING A CHAIR [ON A SCALE OF 1 (NOT AT ALL) TO 5 (COMPLETELY)]?: A LITTLE
IN THE PAST 7 DAYS, HOW WOULD YOU RATE YOUR FATIGUE ON AVERAGE [ON A SCALE FROM 1 (NONE) TO 5 (VERY SEVERE)]?: MODERATE
IN GENERAL, HOW WOULD YOU RATE YOUR SATISFACTION WITH YOUR SOCIAL ACTIVITIES AND RELATIONSHIPS [ON A SCALE OF 1 (POOR) TO 5 (EXCELLENT)]?: GOOD

## 2024-09-23 ENCOUNTER — HOSPITAL ENCOUNTER (OUTPATIENT)
Dept: PREADMISSION TESTING | Age: 74
Discharge: HOME | End: 2024-09-23
Payer: MEDICARE

## 2024-09-23 ENCOUNTER — ANESTHESIA EVENT (OUTPATIENT)
Dept: OPERATING ROOM | Age: 74
End: 2024-09-23
Payer: MEDICARE

## 2024-09-23 ENCOUNTER — PREP FOR PROCEDURE (OUTPATIENT)
Dept: ORTHOPEDIC SURGERY | Age: 74
End: 2024-09-23

## 2024-09-23 VITALS
TEMPERATURE: 97.4 F | RESPIRATION RATE: 18 BRPM | DIASTOLIC BLOOD PRESSURE: 63 MMHG | BODY MASS INDEX: 31.22 KG/M2 | OXYGEN SATURATION: 97 % | HEART RATE: 70 BPM | WEIGHT: 159 LBS | SYSTOLIC BLOOD PRESSURE: 132 MMHG | HEIGHT: 60 IN

## 2024-09-23 LAB
ANION GAP SERPL CALCULATED.3IONS-SCNC: 11 MMOL/L (ref 7–16)
BILIRUB UR QL STRIP: NEGATIVE
BUN SERPL-MCNC: 13 MG/DL (ref 6–23)
CALCIUM SERPL-MCNC: 9.3 MG/DL (ref 8.6–10.2)
CHLORIDE SERPL-SCNC: 103 MMOL/L (ref 98–107)
CLARITY UR: CLEAR
CO2 SERPL-SCNC: 28 MMOL/L (ref 22–29)
COLOR UR: YELLOW
COMMENT: NORMAL
CREAT SERPL-MCNC: 0.8 MG/DL (ref 0.5–1)
ERYTHROCYTE [DISTWIDTH] IN BLOOD BY AUTOMATED COUNT: 13.5 % (ref 11.5–15)
GFR, ESTIMATED: 76 ML/MIN/1.73M2
GLUCOSE SERPL-MCNC: 91 MG/DL (ref 74–99)
GLUCOSE UR STRIP-MCNC: NEGATIVE MG/DL
HCT VFR BLD AUTO: 41.1 % (ref 34–48)
HGB BLD-MCNC: 12.8 G/DL (ref 11.5–15.5)
HGB UR QL STRIP.AUTO: NEGATIVE
KETONES UR STRIP-MCNC: NEGATIVE MG/DL
LEUKOCYTE ESTERASE UR QL STRIP: NEGATIVE
MCH RBC QN AUTO: 27.9 PG (ref 26–35)
MCHC RBC AUTO-ENTMCNC: 31.1 G/DL (ref 32–34.5)
MCV RBC AUTO: 89.7 FL (ref 80–99.9)
NITRITE UR QL STRIP: NEGATIVE
PH UR STRIP: 7.5 [PH] (ref 5–9)
PLATELET # BLD AUTO: 279 K/UL (ref 130–450)
PMV BLD AUTO: 11.4 FL (ref 7–12)
POTASSIUM SERPL-SCNC: 5.3 MMOL/L (ref 3.5–5)
PROT UR STRIP-MCNC: NEGATIVE MG/DL
RBC # BLD AUTO: 4.58 M/UL (ref 3.5–5.5)
SODIUM SERPL-SCNC: 142 MMOL/L (ref 132–146)
SP GR UR STRIP: 1.02 (ref 1–1.03)
UROBILINOGEN UR STRIP-ACNC: 0.2 EU/DL (ref 0–1)
WBC OTHER # BLD: 6.6 K/UL (ref 4.5–11.5)

## 2024-09-23 PROCEDURE — 36415 COLL VENOUS BLD VENIPUNCTURE: CPT

## 2024-09-23 PROCEDURE — 80048 BASIC METABOLIC PNL TOTAL CA: CPT

## 2024-09-23 PROCEDURE — 87081 CULTURE SCREEN ONLY: CPT

## 2024-09-23 PROCEDURE — 81003 URINALYSIS AUTO W/O SCOPE: CPT

## 2024-09-23 PROCEDURE — 85027 COMPLETE CBC AUTOMATED: CPT

## 2024-09-23 RX ORDER — ACETAMINOPHEN 325 MG/1
1000 TABLET ORAL ONCE
Status: CANCELLED | OUTPATIENT
Start: 2024-09-23 | End: 2024-09-23

## 2024-09-23 RX ORDER — SODIUM CHLORIDE 0.9 % (FLUSH) 0.9 %
5-40 SYRINGE (ML) INJECTION PRN
Status: CANCELLED | OUTPATIENT
Start: 2024-09-23

## 2024-09-23 RX ORDER — LIDOCAINE HYDROCHLORIDE 10 MG/ML
10 INJECTION, SOLUTION INFILTRATION; PERINEURAL
OUTPATIENT
Start: 2024-09-23 | End: 2024-09-24

## 2024-09-23 RX ORDER — SODIUM CHLORIDE 9 MG/ML
INJECTION, SOLUTION INTRAVENOUS PRN
Status: CANCELLED | OUTPATIENT
Start: 2024-09-23

## 2024-09-23 RX ORDER — MIDAZOLAM HYDROCHLORIDE 2 MG/2ML
1 INJECTION, SOLUTION INTRAMUSCULAR; INTRAVENOUS EVERY 5 MIN PRN
OUTPATIENT
Start: 2024-09-23

## 2024-09-23 RX ORDER — ROPIVACAINE HYDROCHLORIDE 5 MG/ML
20 INJECTION, SOLUTION EPIDURAL; INFILTRATION; PERINEURAL
OUTPATIENT
Start: 2024-09-23 | End: 2024-09-24

## 2024-09-23 RX ORDER — SODIUM CHLORIDE 0.9 % (FLUSH) 0.9 %
5-40 SYRINGE (ML) INJECTION EVERY 12 HOURS SCHEDULED
Status: CANCELLED | OUTPATIENT
Start: 2024-09-23

## 2024-09-23 RX ORDER — DEXAMETHASONE SODIUM PHOSPHATE 10 MG/ML
4 INJECTION, SOLUTION INTRAMUSCULAR; INTRAVENOUS ONCE
OUTPATIENT
Start: 2024-09-23 | End: 2024-09-23

## 2024-09-23 RX ORDER — SODIUM CHLORIDE, SODIUM LACTATE, POTASSIUM CHLORIDE, CALCIUM CHLORIDE 600; 310; 30; 20 MG/100ML; MG/100ML; MG/100ML; MG/100ML
INJECTION, SOLUTION INTRAVENOUS CONTINUOUS
Status: CANCELLED | OUTPATIENT
Start: 2024-09-23

## 2024-09-23 RX ORDER — FENTANYL CITRATE 50 UG/ML
100 INJECTION, SOLUTION INTRAMUSCULAR; INTRAVENOUS ONCE
OUTPATIENT
Start: 2024-09-23 | End: 2024-09-23

## 2024-09-23 ASSESSMENT — PROMIS GLOBAL HEALTH SCALE
SUM OF RESPONSES TO QUESTIONS 3, 6, 7, & 8: 15
IN GENERAL, HOW WOULD YOU RATE YOUR PHYSICAL HEALTH [ON A SCALE OF 1 (POOR) TO 5 (EXCELLENT)]?: VERY GOOD
IN GENERAL, HOW WOULD YOU RATE YOUR MENTAL HEALTH, INCLUDING YOUR MOOD AND YOUR ABILITY TO THINK [ON A SCALE OF 1 (POOR) TO 5 (EXCELLENT)]?: VERY GOOD
IN THE PAST 7 DAYS, HOW WOULD YOU RATE YOUR PAIN ON AVERAGE [ON A SCALE FROM 0 (NO PAIN) TO 10 (WORST IMAGINABLE PAIN)]?: 5
IN GENERAL, HOW WOULD YOU RATE YOUR SATISFACTION WITH YOUR SOCIAL ACTIVITIES AND RELATIONSHIPS [ON A SCALE OF 1 (POOR) TO 5 (EXCELLENT)]?: EXCELLENT
TO WHAT EXTENT ARE YOU ABLE TO CARRY OUT YOUR EVERYDAY PHYSICAL ACTIVITIES SUCH AS WALKING, CLIMBING STAIRS, CARRYING GROCERIES, OR MOVING A CHAIR [ON A SCALE OF 1 (NOT AT ALL) TO 5 (COMPLETELY)]?: MODERATELY
SUM OF RESPONSES TO QUESTIONS 2, 4, 5, & 10: 16
IN THE PAST 7 DAYS, HOW WOULD YOU RATE YOUR FATIGUE ON AVERAGE [ON A SCALE FROM 1 (NONE) TO 5 (VERY SEVERE)]?: MODERATE
IN GENERAL, WOULD YOU SAY YOUR QUALITY OF LIFE IS...[ON A SCALE OF 1 (POOR) TO 5 (EXCELLENT)]: VERY GOOD
IN THE PAST 7 DAYS, HOW OFTEN HAVE YOU BEEN BOTHERED BY EMOTIONAL PROBLEMS, SUCH AS FEELING ANXIOUS, DEPRESSED, OR IRRITABLE [ON A SCALE FROM 1 (NEVER) TO 5 (ALWAYS)]?: SOMETIMES
HOW IS THE PROMIS V1.1 BEING ADMINISTERED?: ELECTRONIC
WHO IS THE PERSON COMPLETING THE PROMIS V1.1 SURVEY?: SELF
IN GENERAL, WOULD YOU SAY YOUR HEALTH IS...[ON A SCALE OF 1 (POOR) TO 5 (EXCELLENT)]: VERY GOOD
IN GENERAL, PLEASE RATE HOW WELL YOU CARRY OUT YOUR USUAL SOCIAL ACTIVITIES (INCLUDES ACTIVITIES AT HOME, AT WORK, AND IN YOUR COMMUNITY, AND RESPONSIBILITIES AS A PARENT, CHILD, SPOUSE, EMPLOYEE, FRIEND, ETC) [ON A SCALE OF 1 (POOR) TO 5 (EXCELLENT)]?: EXCELLENT

## 2024-09-23 ASSESSMENT — PAIN DESCRIPTION - ONSET: ONSET: ON-GOING

## 2024-09-23 ASSESSMENT — KOOS JR
GOING UP OR DOWN STAIRS: EXTREME
TWISING OR PIVOTING ON KNEE: SEVERE
STANDING UPRIGHT: MODERATE
RISING FROM SITTING: MODERATE
BENDING TO THE FLOOR TO PICK UP OBJECT: MILD
STRAIGHTENING KNEE FULLY: MILD
KOOS JR TOTAL INTERVAL SCORE: 50.012
HOW SEVERE IS YOUR KNEE STIFFNESS AFTER FIRST WAKING IN MORNING: MODERATE

## 2024-09-23 ASSESSMENT — PAIN DESCRIPTION - PAIN TYPE: TYPE: CHRONIC PAIN

## 2024-09-23 ASSESSMENT — PAIN DESCRIPTION - DESCRIPTORS: DESCRIPTORS: BURNING;ACHING

## 2024-09-23 ASSESSMENT — PAIN DESCRIPTION - FREQUENCY: FREQUENCY: CONTINUOUS

## 2024-09-23 ASSESSMENT — PAIN DESCRIPTION - LOCATION: LOCATION: KNEE

## 2024-09-23 ASSESSMENT — PAIN DESCRIPTION - ORIENTATION: ORIENTATION: RIGHT

## 2024-09-23 ASSESSMENT — PAIN SCALES - GENERAL: PAINLEVEL_OUTOF10: 2

## 2024-09-23 ASSESSMENT — PAIN - FUNCTIONAL ASSESSMENT: PAIN_FUNCTIONAL_ASSESSMENT: PREVENTS OR INTERFERES SOME ACTIVE ACTIVITIES AND ADLS

## 2024-09-23 NOTE — H&P
HISTORY AND PHYSICAL             Date: 9/23/2024        Patient Name: Sofia Nunez     YOB: 1950      Age:  74 y.o.    Chief Complaint   Right knee pain         History Obtained From   patient    History of Present Illness   Patient returned with increasing pain in her arthritic right knee. Duration of symptoms greater than 1 year. Steroid injection 2 months ago did not provide significant symptom reduction. Has been using a tens unit. Also taking Tylenol. Ambulating with a cane. Positive limp. Reduction in quality of life due to escalating knee symptoms. Knee pain rating with weightbearing activities at 8/10. Prior history of breast CA and pancreatic mass. No DVT history. Has mild tachycardia. Scheduled to see her cardiologist later this month (Dr. Calderón). Lives in a ranch style home with her spouse with one-step to access. Interested in knee replacement.    Past Medical History     Past Medical History:   Diagnosis Date    Allergic urticaria     Cancer (HCC)     1999 right breast cancer / treated with surgery and chemo    Depression 2000    Diet-controlled diabetes mellitus (HCC)     Hyperlipidemia     DENIES    Hypothyroidism     Osteoarthritis cervical spine     cervical spine    Pancreatic cancer (HCC) 2007    Partial Whipple Procedure - [facility]    Pancreatitis, acute 06/06/2012    Prolonged emergence from general anesthesia         Past Surgical History     Past Surgical History:   Procedure Laterality Date    BREAST RECONSTRUCTION Bilateral 07/25/2019    CAPSULECTOMY OF LEFT BREAST WITH REMOVAL OF SALINE  IMPLANTS AND REPLACMENT WITH BILATERAL SILICONE IMPLANTS performed by Balaji Villafuerte MD at [facility] OR    CATARACT REMOVAL Bilateral 2012    CERVICAL DISC SURGERY  1993    BETY    CERVICAL FUSION  1997    CHOLECYSTECTOMY  2007    with Whipple    COLONOSCOPY  2015    Dr. Bradford    EYE SURGERY Right 1997    to repair muscle     HERNIA REPAIR Right years ago    inguinal

## 2024-09-23 NOTE — ANESTHESIA PRE PROCEDURE
Department of Anesthesiology  Preprocedure Note       Name:  Sofia Nunez   Age:  74 y.o.  :  1950                                          MRN:  [id number]         Date:  2024      Surgeon: Surgeon(s):  Milton Billingsley MD    Procedure: RIGHT KNEE TOTAL ARTHROPLASTY      +++PNB+++          +++JOANIE+++ (Right)    Medications prior to admission:   Prior to Admission medications    Medication Sig Start Date End Date Taking? Authorizing Provider   Multiple Vitamin (MULTIVITAMIN) TABS tablet Take 1 tablet by mouth daily    Dixie Mendoza MD   Probiotic Product (PROBIOTIC ADVANCED PO) Take by mouth    Dixie Mendoza MD   fluticasone (FLONASE) 50 MCG/ACT nasal spray 1 spray by Each Nostril route 2 times daily 6/3/24   Valeriano Robbins,    metoprolol succinate (TOPROL XL) 25 MG extended release tablet TAKE ONE TABLET BY MOUTH TWO TIMES A DAY  Patient taking differently: daily Indications: taking 1 1/2 tablets daily 24   Ibrahima Calderón,    levothyroxine (SYNTHROID) 100 MCG tablet One three times a week 24   Valeriano Robbins, DO   FLUoxetine (PROZAC) 10 MG capsule Take 1 capsule by mouth daily 24   Valeriano Robbins, DO   levothyroxine (SYNTHROID) 88 MCG tablet One four times a week 24   Valeriano Robbins, DO   CRANBERRY EXTRACT PO Take 500 mg by mouth    Dixie Mendoza MD   calcium carbonate 600 MG TABS tablet Take 1 tablet by mouth daily    Dixie Mendoza MD       Current medications:    Current Outpatient Medications   Medication Sig Dispense Refill    Multiple Vitamin (MULTIVITAMIN) TABS tablet Take 1 tablet by mouth daily      Probiotic Product (PROBIOTIC ADVANCED PO) Take by mouth      fluticasone (FLONASE) 50 MCG/ACT nasal spray 1 spray by Each Nostril route 2 times daily 1 each 5    metoprolol succinate (TOPROL XL) 25 MG extended release tablet TAKE ONE TABLET BY MOUTH TWO TIMES A DAY (Patient taking differently: daily Indications: taking 1 1/2

## 2024-09-25 LAB
MICROORGANISM SPEC CULT: NORMAL
SPECIMEN DESCRIPTION: NORMAL

## 2024-10-02 NOTE — PROGRESS NOTES
Reviewed potassium level drawn 9/23/2024 with Dr. Knox, anesthesiologist. Order received to repeat potassium day of surgery

## 2024-10-03 ENCOUNTER — ANESTHESIA (OUTPATIENT)
Dept: OPERATING ROOM | Age: 74
End: 2024-10-03
Payer: MEDICARE

## 2024-10-03 ENCOUNTER — APPOINTMENT (OUTPATIENT)
Dept: GENERAL RADIOLOGY | Age: 74
End: 2024-10-03
Attending: ORTHOPAEDIC SURGERY
Payer: MEDICARE

## 2024-10-03 ENCOUNTER — HOSPITAL ENCOUNTER (OUTPATIENT)
Age: 74
Setting detail: OBSERVATION
Discharge: HOME HEALTH | End: 2024-10-04
Attending: ORTHOPAEDIC SURGERY | Admitting: ORTHOPAEDIC SURGERY
Payer: MEDICARE

## 2024-10-03 DIAGNOSIS — M17.11 OSTEOARTHRITIS OF RIGHT KNEE, UNSPECIFIED OSTEOARTHRITIS TYPE: ICD-10-CM

## 2024-10-03 DIAGNOSIS — G89.18 POST-OPERATIVE PAIN: Primary | ICD-10-CM

## 2024-10-03 LAB — POTASSIUM SERPL-SCNC: 4.5 MMOL/L (ref 3.5–5)

## 2024-10-03 PROCEDURE — G0378 HOSPITAL OBSERVATION PER HR: HCPCS

## 2024-10-03 PROCEDURE — 7100000001 HC PACU RECOVERY - ADDTL 15 MIN: Performed by: ORTHOPAEDIC SURGERY

## 2024-10-03 PROCEDURE — C1713 ANCHOR/SCREW BN/BN,TIS/BN: HCPCS | Performed by: ORTHOPAEDIC SURGERY

## 2024-10-03 PROCEDURE — 2580000003 HC RX 258: Performed by: ORTHOPAEDIC SURGERY

## 2024-10-03 PROCEDURE — 3600000015 HC SURGERY LEVEL 5 ADDTL 15MIN: Performed by: ORTHOPAEDIC SURGERY

## 2024-10-03 PROCEDURE — 2580000003 HC RX 258: Performed by: ANESTHESIOLOGY

## 2024-10-03 PROCEDURE — 88305 TISSUE EXAM BY PATHOLOGIST: CPT

## 2024-10-03 PROCEDURE — 3600000005 HC SURGERY LEVEL 5 BASE: Performed by: ORTHOPAEDIC SURGERY

## 2024-10-03 PROCEDURE — 2709999900 HC NON-CHARGEABLE SUPPLY: Performed by: ORTHOPAEDIC SURGERY

## 2024-10-03 PROCEDURE — 97535 SELF CARE MNGMENT TRAINING: CPT

## 2024-10-03 PROCEDURE — 97165 OT EVAL LOW COMPLEX 30 MIN: CPT

## 2024-10-03 PROCEDURE — 6360000002 HC RX W HCPCS: Performed by: ORTHOPAEDIC SURGERY

## 2024-10-03 PROCEDURE — 97530 THERAPEUTIC ACTIVITIES: CPT

## 2024-10-03 PROCEDURE — 2580000003 HC RX 258: Performed by: PHYSICIAN ASSISTANT

## 2024-10-03 PROCEDURE — 88311 DECALCIFY TISSUE: CPT

## 2024-10-03 PROCEDURE — 2580000003 HC RX 258

## 2024-10-03 PROCEDURE — 6360000002 HC RX W HCPCS: Performed by: PHYSICIAN ASSISTANT

## 2024-10-03 PROCEDURE — 6370000000 HC RX 637 (ALT 250 FOR IP): Performed by: ORTHOPAEDIC SURGERY

## 2024-10-03 PROCEDURE — 6360000002 HC RX W HCPCS: Performed by: ANESTHESIOLOGY

## 2024-10-03 PROCEDURE — 2500000003 HC RX 250 WO HCPCS: Performed by: ORTHOPAEDIC SURGERY

## 2024-10-03 PROCEDURE — C1729 CATH, DRAINAGE: HCPCS | Performed by: ORTHOPAEDIC SURGERY

## 2024-10-03 PROCEDURE — 6360000002 HC RX W HCPCS

## 2024-10-03 PROCEDURE — 64447 NJX AA&/STRD FEMORAL NRV IMG: CPT | Performed by: ANESTHESIOLOGY

## 2024-10-03 PROCEDURE — 73560 X-RAY EXAM OF KNEE 1 OR 2: CPT

## 2024-10-03 PROCEDURE — 7100000000 HC PACU RECOVERY - FIRST 15 MIN: Performed by: ORTHOPAEDIC SURGERY

## 2024-10-03 PROCEDURE — 84132 ASSAY OF SERUM POTASSIUM: CPT

## 2024-10-03 PROCEDURE — 6370000000 HC RX 637 (ALT 250 FOR IP): Performed by: PHYSICIAN ASSISTANT

## 2024-10-03 PROCEDURE — 2500000003 HC RX 250 WO HCPCS: Performed by: PHYSICIAN ASSISTANT

## 2024-10-03 PROCEDURE — 3700000001 HC ADD 15 MINUTES (ANESTHESIA): Performed by: ORTHOPAEDIC SURGERY

## 2024-10-03 PROCEDURE — 3700000000 HC ANESTHESIA ATTENDED CARE: Performed by: ORTHOPAEDIC SURGERY

## 2024-10-03 PROCEDURE — C1776 JOINT DEVICE (IMPLANTABLE): HCPCS | Performed by: ORTHOPAEDIC SURGERY

## 2024-10-03 PROCEDURE — 97110 THERAPEUTIC EXERCISES: CPT

## 2024-10-03 PROCEDURE — 2500000003 HC RX 250 WO HCPCS: Performed by: ANESTHESIOLOGY

## 2024-10-03 DEVICE — IMPLANTABLE DEVICE
Type: IMPLANTABLE DEVICE | Site: KNEE | Status: FUNCTIONAL
Brand: PERSONA® VIVACIT-E®

## 2024-10-03 DEVICE — IMPLANTABLE DEVICE
Type: IMPLANTABLE DEVICE | Site: KNEE | Status: FUNCTIONAL
Brand: PERSONA®

## 2024-10-03 DEVICE — COMPONENT PAT DIA29MM THK8MM KNEE POLY CEM CONVENTIONAL: Type: IMPLANTABLE DEVICE | Site: KNEE | Status: FUNCTIONAL

## 2024-10-03 DEVICE — IMPLANTABLE DEVICE
Type: IMPLANTABLE DEVICE | Site: KNEE | Status: FUNCTIONAL
Brand: REFOBACIN® BONE CEMENT R

## 2024-10-03 DEVICE — PSN TIB STM 5 DEG SZ D R: Type: IMPLANTABLE DEVICE | Site: KNEE | Status: FUNCTIONAL

## 2024-10-03 RX ORDER — ROPIVACAINE HYDROCHLORIDE 5 MG/ML
INJECTION, SOLUTION EPIDURAL; INFILTRATION; PERINEURAL
Status: COMPLETED | OUTPATIENT
Start: 2024-10-03 | End: 2024-10-03

## 2024-10-03 RX ORDER — METOCLOPRAMIDE HYDROCHLORIDE 5 MG/ML
10 INJECTION INTRAMUSCULAR; INTRAVENOUS ONCE
Status: COMPLETED | OUTPATIENT
Start: 2024-10-03 | End: 2024-10-03

## 2024-10-03 RX ORDER — DIPHENHYDRAMINE HCL 25 MG
25 TABLET ORAL EVERY 6 HOURS PRN
Status: DISCONTINUED | OUTPATIENT
Start: 2024-10-03 | End: 2024-10-04 | Stop reason: HOSPADM

## 2024-10-03 RX ORDER — FENTANYL CITRATE 50 UG/ML
INJECTION, SOLUTION INTRAMUSCULAR; INTRAVENOUS
Status: DISCONTINUED | OUTPATIENT
Start: 2024-10-03 | End: 2024-10-03 | Stop reason: SDUPTHER

## 2024-10-03 RX ORDER — SODIUM CHLORIDE, SODIUM LACTATE, POTASSIUM CHLORIDE, CALCIUM CHLORIDE 600; 310; 30; 20 MG/100ML; MG/100ML; MG/100ML; MG/100ML
INJECTION, SOLUTION INTRAVENOUS CONTINUOUS
Status: DISCONTINUED | OUTPATIENT
Start: 2024-10-03 | End: 2024-10-04 | Stop reason: HOSPADM

## 2024-10-03 RX ORDER — ROPIVACAINE HYDROCHLORIDE 5 MG/ML
20 INJECTION, SOLUTION EPIDURAL; INFILTRATION; PERINEURAL
Status: DISCONTINUED | OUTPATIENT
Start: 2024-10-03 | End: 2024-10-03 | Stop reason: HOSPADM

## 2024-10-03 RX ORDER — BUPIVACAINE HYDROCHLORIDE 7.5 MG/ML
INJECTION, SOLUTION INTRASPINAL
Status: DISCONTINUED | OUTPATIENT
Start: 2024-10-03 | End: 2024-10-03 | Stop reason: SDUPTHER

## 2024-10-03 RX ORDER — SODIUM CHLORIDE 9 MG/ML
INJECTION, SOLUTION INTRAVENOUS PRN
Status: DISCONTINUED | OUTPATIENT
Start: 2024-10-03 | End: 2024-10-03 | Stop reason: HOSPADM

## 2024-10-03 RX ORDER — LEVOTHYROXINE SODIUM 88 UG/1
88 TABLET ORAL
Status: DISCONTINUED | OUTPATIENT
Start: 2024-10-04 | End: 2024-10-04 | Stop reason: HOSPADM

## 2024-10-03 RX ORDER — HYDRALAZINE HYDROCHLORIDE 20 MG/ML
5 INJECTION INTRAMUSCULAR; INTRAVENOUS
Status: DISCONTINUED | OUTPATIENT
Start: 2024-10-03 | End: 2024-10-03 | Stop reason: HOSPADM

## 2024-10-03 RX ORDER — VANCOMYCIN HYDROCHLORIDE 1 G/20ML
INJECTION, POWDER, LYOPHILIZED, FOR SOLUTION INTRAVENOUS PRN
Status: DISCONTINUED | OUTPATIENT
Start: 2024-10-03 | End: 2024-10-03 | Stop reason: ALTCHOICE

## 2024-10-03 RX ORDER — TRANEXAMIC ACID 10 MG/ML
1000 INJECTION, SOLUTION INTRAVENOUS
Status: COMPLETED | OUTPATIENT
Start: 2024-10-03 | End: 2024-10-03

## 2024-10-03 RX ORDER — ONDANSETRON 4 MG/1
4 TABLET, ORALLY DISINTEGRATING ORAL EVERY 8 HOURS PRN
Status: DISCONTINUED | OUTPATIENT
Start: 2024-10-03 | End: 2024-10-04 | Stop reason: HOSPADM

## 2024-10-03 RX ORDER — SODIUM CHLORIDE 9 MG/ML
INJECTION, SOLUTION INTRAVENOUS
Status: DISCONTINUED | OUTPATIENT
Start: 2024-10-03 | End: 2024-10-03 | Stop reason: SDUPTHER

## 2024-10-03 RX ORDER — SODIUM CHLORIDE 0.9 % (FLUSH) 0.9 %
5-40 SYRINGE (ML) INJECTION EVERY 12 HOURS SCHEDULED
Status: DISCONTINUED | OUTPATIENT
Start: 2024-10-03 | End: 2024-10-03 | Stop reason: HOSPADM

## 2024-10-03 RX ORDER — FENTANYL CITRATE 50 UG/ML
25 INJECTION, SOLUTION INTRAMUSCULAR; INTRAVENOUS EVERY 5 MIN PRN
Status: DISCONTINUED | OUTPATIENT
Start: 2024-10-03 | End: 2024-10-03 | Stop reason: HOSPADM

## 2024-10-03 RX ORDER — ONDANSETRON 2 MG/ML
INJECTION INTRAMUSCULAR; INTRAVENOUS
Status: DISCONTINUED | OUTPATIENT
Start: 2024-10-03 | End: 2024-10-03 | Stop reason: SDUPTHER

## 2024-10-03 RX ORDER — DEXAMETHASONE SODIUM PHOSPHATE 10 MG/ML
4 INJECTION, SOLUTION INTRAMUSCULAR; INTRAVENOUS ONCE
Status: DISCONTINUED | OUTPATIENT
Start: 2024-10-03 | End: 2024-10-03 | Stop reason: HOSPADM

## 2024-10-03 RX ORDER — FLUOXETINE 10 MG/1
10 CAPSULE ORAL DAILY
Status: DISCONTINUED | OUTPATIENT
Start: 2024-10-03 | End: 2024-10-03 | Stop reason: CLARIF

## 2024-10-03 RX ORDER — DIPHENHYDRAMINE HYDROCHLORIDE 50 MG/ML
12.5 INJECTION INTRAMUSCULAR; INTRAVENOUS
Status: DISCONTINUED | OUTPATIENT
Start: 2024-10-03 | End: 2024-10-03 | Stop reason: HOSPADM

## 2024-10-03 RX ORDER — ACETAMINOPHEN 500 MG
1000 TABLET ORAL ONCE
Status: COMPLETED | OUTPATIENT
Start: 2024-10-03 | End: 2024-10-03

## 2024-10-03 RX ORDER — SODIUM CHLORIDE 9 MG/ML
INJECTION, SOLUTION INTRAVENOUS PRN
Status: DISCONTINUED | OUTPATIENT
Start: 2024-10-03 | End: 2024-10-04 | Stop reason: HOSPADM

## 2024-10-03 RX ORDER — ACETAMINOPHEN 325 MG/1
650 TABLET ORAL EVERY 6 HOURS
Status: DISCONTINUED | OUTPATIENT
Start: 2024-10-03 | End: 2024-10-04 | Stop reason: HOSPADM

## 2024-10-03 RX ORDER — MIDAZOLAM HYDROCHLORIDE 2 MG/2ML
1 INJECTION, SOLUTION INTRAMUSCULAR; INTRAVENOUS EVERY 5 MIN PRN
Status: DISCONTINUED | OUTPATIENT
Start: 2024-10-03 | End: 2024-10-03 | Stop reason: HOSPADM

## 2024-10-03 RX ORDER — FAMOTIDINE 20 MG/1
20 TABLET, FILM COATED ORAL DAILY
Status: DISCONTINUED | OUTPATIENT
Start: 2024-10-03 | End: 2024-10-04 | Stop reason: HOSPADM

## 2024-10-03 RX ORDER — SODIUM CHLORIDE 0.9 % (FLUSH) 0.9 %
5-40 SYRINGE (ML) INJECTION EVERY 12 HOURS SCHEDULED
Status: DISCONTINUED | OUTPATIENT
Start: 2024-10-03 | End: 2024-10-04 | Stop reason: HOSPADM

## 2024-10-03 RX ORDER — SENNA AND DOCUSATE SODIUM 50; 8.6 MG/1; MG/1
1 TABLET, FILM COATED ORAL 2 TIMES DAILY
Status: DISCONTINUED | OUTPATIENT
Start: 2024-10-03 | End: 2024-10-04 | Stop reason: HOSPADM

## 2024-10-03 RX ORDER — FLUOXETINE 10 MG/1
10 TABLET, FILM COATED ORAL DAILY
Status: DISCONTINUED | OUTPATIENT
Start: 2024-10-03 | End: 2024-10-03 | Stop reason: CLARIF

## 2024-10-03 RX ORDER — FLUTICASONE PROPIONATE 50 MCG
1 SPRAY, SUSPENSION (ML) NASAL 2 TIMES DAILY
Status: DISCONTINUED | OUTPATIENT
Start: 2024-10-03 | End: 2024-10-04 | Stop reason: HOSPADM

## 2024-10-03 RX ORDER — DEXAMETHASONE SODIUM PHOSPHATE 4 MG/ML
INJECTION, SOLUTION INTRA-ARTICULAR; INTRALESIONAL; INTRAMUSCULAR; INTRAVENOUS; SOFT TISSUE
Status: DISCONTINUED | OUTPATIENT
Start: 2024-10-03 | End: 2024-10-03 | Stop reason: SDUPTHER

## 2024-10-03 RX ORDER — MEPERIDINE HYDROCHLORIDE 25 MG/ML
12.5 INJECTION INTRAMUSCULAR; INTRAVENOUS; SUBCUTANEOUS EVERY 5 MIN PRN
Status: DISCONTINUED | OUTPATIENT
Start: 2024-10-03 | End: 2024-10-03 | Stop reason: HOSPADM

## 2024-10-03 RX ORDER — METOPROLOL SUCCINATE 25 MG/1
25 TABLET, EXTENDED RELEASE ORAL DAILY
Status: DISCONTINUED | OUTPATIENT
Start: 2024-10-04 | End: 2024-10-04 | Stop reason: HOSPADM

## 2024-10-03 RX ORDER — FENTANYL CITRATE 50 UG/ML
100 INJECTION, SOLUTION INTRAMUSCULAR; INTRAVENOUS ONCE
Status: COMPLETED | OUTPATIENT
Start: 2024-10-03 | End: 2024-10-03

## 2024-10-03 RX ORDER — SODIUM CHLORIDE 0.9 % (FLUSH) 0.9 %
5-40 SYRINGE (ML) INJECTION PRN
Status: DISCONTINUED | OUTPATIENT
Start: 2024-10-03 | End: 2024-10-03 | Stop reason: HOSPADM

## 2024-10-03 RX ORDER — SODIUM CHLORIDE 0.9 % (FLUSH) 0.9 %
5-40 SYRINGE (ML) INJECTION PRN
Status: DISCONTINUED | OUTPATIENT
Start: 2024-10-03 | End: 2024-10-04 | Stop reason: HOSPADM

## 2024-10-03 RX ORDER — FLUOXETINE 10 MG/1
10 TABLET, FILM COATED ORAL
Status: DISCONTINUED | OUTPATIENT
Start: 2024-10-03 | End: 2024-10-04 | Stop reason: HOSPADM

## 2024-10-03 RX ORDER — LIDOCAINE HYDROCHLORIDE 10 MG/ML
10 INJECTION, SOLUTION INFILTRATION; PERINEURAL
Status: DISCONTINUED | OUTPATIENT
Start: 2024-10-03 | End: 2024-10-03 | Stop reason: HOSPADM

## 2024-10-03 RX ORDER — SODIUM CHLORIDE, SODIUM LACTATE, POTASSIUM CHLORIDE, CALCIUM CHLORIDE 600; 310; 30; 20 MG/100ML; MG/100ML; MG/100ML; MG/100ML
INJECTION, SOLUTION INTRAVENOUS CONTINUOUS
Status: DISCONTINUED | OUTPATIENT
Start: 2024-10-03 | End: 2024-10-03 | Stop reason: HOSPADM

## 2024-10-03 RX ORDER — PROPOFOL 10 MG/ML
INJECTION, EMULSION INTRAVENOUS
Status: DISCONTINUED | OUTPATIENT
Start: 2024-10-03 | End: 2024-10-03 | Stop reason: SDUPTHER

## 2024-10-03 RX ORDER — LEVOTHYROXINE SODIUM 100 UG/1
100 TABLET ORAL
Status: DISCONTINUED | OUTPATIENT
Start: 2024-10-03 | End: 2024-10-04 | Stop reason: HOSPADM

## 2024-10-03 RX ORDER — PROCHLORPERAZINE EDISYLATE 5 MG/ML
5 INJECTION INTRAMUSCULAR; INTRAVENOUS
Status: DISCONTINUED | OUTPATIENT
Start: 2024-10-03 | End: 2024-10-03 | Stop reason: HOSPADM

## 2024-10-03 RX ORDER — LABETALOL HYDROCHLORIDE 5 MG/ML
5 INJECTION, SOLUTION INTRAVENOUS
Status: DISCONTINUED | OUTPATIENT
Start: 2024-10-03 | End: 2024-10-03 | Stop reason: HOSPADM

## 2024-10-03 RX ORDER — ONDANSETRON 2 MG/ML
4 INJECTION INTRAMUSCULAR; INTRAVENOUS EVERY 6 HOURS PRN
Status: DISCONTINUED | OUTPATIENT
Start: 2024-10-03 | End: 2024-10-04 | Stop reason: HOSPADM

## 2024-10-03 RX ORDER — OXYCODONE HYDROCHLORIDE 5 MG/1
5 TABLET ORAL EVERY 4 HOURS PRN
Status: DISCONTINUED | OUTPATIENT
Start: 2024-10-03 | End: 2024-10-04 | Stop reason: HOSPADM

## 2024-10-03 RX ORDER — NALOXONE HYDROCHLORIDE 0.4 MG/ML
INJECTION, SOLUTION INTRAMUSCULAR; INTRAVENOUS; SUBCUTANEOUS PRN
Status: DISCONTINUED | OUTPATIENT
Start: 2024-10-03 | End: 2024-10-03 | Stop reason: HOSPADM

## 2024-10-03 RX ORDER — DIPHENHYDRAMINE HYDROCHLORIDE 50 MG/ML
25 INJECTION INTRAMUSCULAR; INTRAVENOUS EVERY 6 HOURS PRN
Status: DISCONTINUED | OUTPATIENT
Start: 2024-10-03 | End: 2024-10-04 | Stop reason: HOSPADM

## 2024-10-03 RX ORDER — DEXAMETHASONE SODIUM PHOSPHATE 10 MG/ML
8 INJECTION, SOLUTION INTRAMUSCULAR; INTRAVENOUS ONCE
Status: DISCONTINUED | OUTPATIENT
Start: 2024-10-03 | End: 2024-10-03 | Stop reason: HOSPADM

## 2024-10-03 RX ORDER — KETOROLAC TROMETHAMINE 15 MG/ML
15 INJECTION, SOLUTION INTRAMUSCULAR; INTRAVENOUS EVERY 6 HOURS
Status: DISCONTINUED | OUTPATIENT
Start: 2024-10-03 | End: 2024-10-04 | Stop reason: HOSPADM

## 2024-10-03 RX ORDER — GLYCOPYRROLATE 0.2 MG/ML
INJECTION INTRAMUSCULAR; INTRAVENOUS
Status: DISCONTINUED | OUTPATIENT
Start: 2024-10-03 | End: 2024-10-03 | Stop reason: SDUPTHER

## 2024-10-03 RX ADMIN — ACETAMINOPHEN 650 MG: 325 TABLET ORAL at 13:44

## 2024-10-03 RX ADMIN — MIDAZOLAM HYDROCHLORIDE 1 MG: 1 INJECTION, SOLUTION INTRAMUSCULAR; INTRAVENOUS at 08:38

## 2024-10-03 RX ADMIN — FLUTICASONE PROPIONATE 1 SPRAY: 50 SPRAY, METERED NASAL at 21:40

## 2024-10-03 RX ADMIN — WATER 2000 MG: 1 INJECTION INTRAMUSCULAR; INTRAVENOUS; SUBCUTANEOUS at 17:09

## 2024-10-03 RX ADMIN — BUPIVACAINE HYDROCHLORIDE IN DEXTROSE 1.3 ML: 7.5 INJECTION, SOLUTION SUBARACHNOID at 09:15

## 2024-10-03 RX ADMIN — SODIUM CHLORIDE, POTASSIUM CHLORIDE, SODIUM LACTATE AND CALCIUM CHLORIDE: 600; 310; 30; 20 INJECTION, SOLUTION INTRAVENOUS at 13:50

## 2024-10-03 RX ADMIN — FLUTICASONE PROPIONATE 1 SPRAY: 50 SPRAY, METERED NASAL at 13:47

## 2024-10-03 RX ADMIN — KETOROLAC TROMETHAMINE 15 MG: 15 INJECTION, SOLUTION INTRAMUSCULAR; INTRAVENOUS at 19:07

## 2024-10-03 RX ADMIN — ACETAMINOPHEN 1000 MG: 500 TABLET ORAL at 07:02

## 2024-10-03 RX ADMIN — FAMOTIDINE 20 MG: 20 TABLET ORAL at 13:44

## 2024-10-03 RX ADMIN — FENTANYL CITRATE 50 MCG: 50 INJECTION INTRAMUSCULAR; INTRAVENOUS at 08:38

## 2024-10-03 RX ADMIN — APIXABAN 2.5 MG: 2.5 TABLET, FILM COATED ORAL at 21:40

## 2024-10-03 RX ADMIN — ROPIVACAINE HYDROCHLORIDE 30 ML: 5 INJECTION, SOLUTION EPIDURAL; INFILTRATION; PERINEURAL at 08:41

## 2024-10-03 RX ADMIN — DOCUSATE SODIUM 50 MG AND SENNOSIDES 8.6 MG 1 TABLET: 8.6; 5 TABLET, FILM COATED ORAL at 13:44

## 2024-10-03 RX ADMIN — ONDANSETRON 4 MG: 2 INJECTION INTRAMUSCULAR; INTRAVENOUS at 09:20

## 2024-10-03 RX ADMIN — BENZOCAINE AND MENTHOL 1 LOZENGE: 15; 3.6 LOZENGE ORAL at 21:40

## 2024-10-03 RX ADMIN — LEVOTHYROXINE SODIUM 100 MCG: 100 TABLET ORAL at 13:44

## 2024-10-03 RX ADMIN — FENTANYL CITRATE 50 MCG: 50 INJECTION, SOLUTION INTRAMUSCULAR; INTRAVENOUS at 09:08

## 2024-10-03 RX ADMIN — FAMOTIDINE 20 MG: 10 INJECTION, SOLUTION INTRAVENOUS at 07:02

## 2024-10-03 RX ADMIN — FLUOXETINE 10 MG: 10 TABLET, FILM COATED ORAL at 17:10

## 2024-10-03 RX ADMIN — ACETAMINOPHEN 650 MG: 325 TABLET ORAL at 19:07

## 2024-10-03 RX ADMIN — DOCUSATE SODIUM 50 MG AND SENNOSIDES 8.6 MG 1 TABLET: 8.6; 5 TABLET, FILM COATED ORAL at 21:40

## 2024-10-03 RX ADMIN — DEXAMETHASONE SODIUM PHOSPHATE 10 MG: 4 INJECTION, SOLUTION INTRAMUSCULAR; INTRAVENOUS at 09:20

## 2024-10-03 RX ADMIN — WATER 2000 MG: 1 INJECTION INTRAMUSCULAR; INTRAVENOUS; SUBCUTANEOUS at 08:55

## 2024-10-03 RX ADMIN — SODIUM CHLORIDE: 9 INJECTION, SOLUTION INTRAVENOUS at 08:55

## 2024-10-03 RX ADMIN — METOCLOPRAMIDE HYDROCHLORIDE 10 MG: 5 INJECTION INTRAMUSCULAR; INTRAVENOUS at 07:03

## 2024-10-03 RX ADMIN — TRANEXAMIC ACID 1000 MG: 10 INJECTION, SOLUTION INTRAVENOUS at 11:33

## 2024-10-03 RX ADMIN — PROPOFOL 40 MG: 10 INJECTION, EMULSION INTRAVENOUS at 09:17

## 2024-10-03 RX ADMIN — GLYCOPYRROLATE 0.2 MG: 0.2 INJECTION INTRAMUSCULAR; INTRAVENOUS at 09:30

## 2024-10-03 RX ADMIN — PROPOFOL 75 MCG/KG/MIN: 10 INJECTION, EMULSION INTRAVENOUS at 09:18

## 2024-10-03 RX ADMIN — TRANEXAMIC ACID 1000 MG: 10 INJECTION, SOLUTION INTRAVENOUS at 09:00

## 2024-10-03 ASSESSMENT — PAIN SCALES - GENERAL
PAINLEVEL_OUTOF10: 0
PAINLEVEL_OUTOF10: 3

## 2024-10-03 ASSESSMENT — LIFESTYLE VARIABLES: SMOKING_STATUS: 0

## 2024-10-03 ASSESSMENT — PAIN DESCRIPTION - ORIENTATION
ORIENTATION: RIGHT
ORIENTATION: RIGHT

## 2024-10-03 ASSESSMENT — PAIN - FUNCTIONAL ASSESSMENT
PAIN_FUNCTIONAL_ASSESSMENT: ACTIVITIES ARE NOT PREVENTED
PAIN_FUNCTIONAL_ASSESSMENT: NONE - DENIES PAIN
PAIN_FUNCTIONAL_ASSESSMENT: 0-10

## 2024-10-03 ASSESSMENT — PAIN DESCRIPTION - LOCATION
LOCATION: KNEE
LOCATION: KNEE
LOCATION: THROAT

## 2024-10-03 ASSESSMENT — PAIN DESCRIPTION - DESCRIPTORS
DESCRIPTORS: ACHING
DESCRIPTORS: DISCOMFORT

## 2024-10-03 NOTE — PROGRESS NOTES
Physical Therapy  Facility/Department: [address] MED SURG  Physical Therapy Initial Assessment    Name: Sofia Nunez  : 1950  MRN: [id number]  Date of Service: 10/3/2024    Attending Provider:  Milton Billingsley MD    Evaluating PT:  Abhishek Aguilar Jr., P.T.    Room #:  45/45  Diagnosis:  Osteoarthritis of right knee, unspecified osteoarthritis type [M17.11]  Procedure/Surgery:  R TKA 10/3/24  Precautions:  WBAT RLE  Equipment Needs:  wheeled walker    SUBJECTIVE:    Pt lives with her  in a 1 story condo with 1 step to enter.  Pt ambulated with a cane PTA.    OBJECTIVE:   Initial Evaluation  Date: 10/3/24 Treatment Short Term/ Long Term   Goals   Was pt agreeable to Eval/treatment? yes     Does pt have pain? No c/o pain     Bed Mobility  Rolling: Independent  Supine to sit: Independent  Sit to supine: Independent  Scooting: Independent  Independent   Transfers Sit to stand: SBA  Stand to sit: SBA  Stand pivot: SBA with ww  Independent    Ambulation   15+200 feet with ww SBA  300 feet with ww Independent    Stair negotiation: ascended and descended NA  3 steps with 2 rails and 1 step with ww Independent    AM-PAC 6 Clicks        Pt is A & O x4  BLE ROM is WFL, except R knee is 0-90°.   LLE strength is grossly 4+/5 and RLE is grossly 4-/5 to 4/5.   Sensation:  Pt denies numbness and tingling to extremities  Balance: sitting is Independent and standing with ww is SBA  Endurance: fair+    Patient education  Pt educated on gait sequence with ww and WBAT RLE.    Patient response to education:   Pt verbalized understanding Pt demonstrated skill Pt requires further education in this area   yes yes yes     ASSESSMENT:    Conditions Requiring Skilled Therapeutic Intervention:    [x]Decreased strength     [x]Decreased ROM  [x]Decreased functional mobility  []Decreased balance   [x]Decreased endurance   []Decreased posture  []Decreased sensation  []Decreased coordination   []Decreased

## 2024-10-03 NOTE — PROGRESS NOTES
4 Eyes Skin Assessment     NAME:  Sofia Nunez  YOB: 1950  MEDICAL RECORD NUMBER:  [id number]    The patient is being assessed for  Admission    I agree that at least one RN has performed a thorough Head to Toe Skin Assessment on the patient. ALL assessment sites listed below have been assessed.      Areas assessed by both nurses:    Head, Face, Ears, Shoulders, Back, Chest, Arms, Elbows, Hands, Sacrum. Buttock, Coccyx, Ischium, Legs. Feet and Heels, and Under Medical Devices         Does the Patient have a Wound? No noted wound(s)       Michael Prevention initiated by RN: Yes  Wound Care Orders initiated by RN: No    Pressure Injury (Stage 3,4, Unstageable, DTI, NWPT, and Complex wounds) if present, place Wound referral order by RN under : No    New Ostomies, if present place, Ostomy referral order under : No     Nurse 1 eSignature: Electronically signed by Obdulio López RN on 10/3/24 at 1:59 PM EDT    **SHARE this note so that the co-signing nurse can place an eSignature**    Nurse 2 eSignature: Electronically signed by Wesley Soto RN on 10/3/24 at 2:04 PM EDT

## 2024-10-03 NOTE — PROGRESS NOTES
Occupational Therapy  OCCUPATIONAL THERAPY INITIAL EVALUATION  [facility]  [address], [place]    Date: 10/3/2024     Patient Name: Sofia Nunez  MRN: [id number]  : 1950  Room: [address] [place] [address]    Evaluating OT: Martha Ontiveros, OTR/L - [id number]    Referring Provider: Milton Billingsley MD  Specific Provider Orders/Date: \"OT eval and treat\" - 10/3/2024    Diagnosis: Osteoarthritis of right knee, unspecified osteoarthritis type [M17.11]      Surgery: Patient underwent R TKA on 10/3/2024.    Pertinent Medical History: cancer, depression, OA     Precautions: fall risk, FWBAT, [place]    Assessment of Current Deficits:    [x] Functional mobility   [x] ADLs  [x] Strength               [] Cognition   [x] Functional transfers   [x] IADLs         [x] Safety Awareness   [x] Endurance   [] Fine Motor Coordination  [x] Balance      [] Vision/Perception   [x] Sensation    [] Gross Motor Coordination [] ROM          [] Delirium                  [] Motor Control     OT PLAN OF CARE   OT POC is based on physician orders, patient diagnosis, and results of clinical assessment.  Frequency/Duration 2-5 days/week for 2-4 weeks PRN   Specific OT Treatment Interventions to Include:   * Instruction/training on adapted ADL techniques and AE recommendations to increase functional independence within precautions       * Training on energy conservation strategies, correct breathing pattern and techniques to improve independence/tolerance for self-care routine  * Functional transfer/mobility training/DME recommendations for increased independence, safety, and fall prevention  * Patient/Family education to increase follow through with safety techniques and functional independence  * Recommendation of environmental modifications for increased safety with functional transfers/mobility and ADLs  * Therapeutic exercise to improve motor endurance, ROM, and functional strength

## 2024-10-03 NOTE — CARE COORDINATION
Social Work:    P/O left TKA. Mrs. Nunez was met in ortho [place] by  Yanelis and was provided all information related to the discharge process. Social service met with Mrs. Acosta (Erin), advised her about social service role and discussed discharge planning. Erin is a patient of Dr Robbins. She resides with her  in a one floor condo with one entry step, walk-in shower with bench, high commode set- up. Erin expects to discharge home with a wheeled walker [facility].  Social work called a referral to [facility] who accepted for discharge [facility] tomorrow.    Electronically signed by CHARLES Pereira on 10/3/2024 at 1:33 PM

## 2024-10-03 NOTE — BRIEF OP NOTE
Brief Postoperative Note      Patient: Sofia Nunez  YOB: 1950  MRN: [id number]    Date of Procedure: 10/3/2024    Pre-Op Diagnosis Codes:      * Osteoarthritis of right knee, unspecified osteoarthritis type [M17.11]    Post-Op Diagnosis: Same       Procedure(s):  RIGHT KNEE TOTAL ARTHROPLASTY    Surgeon(s):  Milton Billingsley MD    Assistant:  Physician Assistant: Roula Harris PA-C    Anesthesia: Spinal    Estimated Blood Loss (mL): less than 50     Complications: None    Specimens:   ID Type Source Tests Collected by Time Destination   A : RIGHT KNEE BONE Bone Bone SURGICAL PATHOLOGY Milton Billingsley MD 10/3/2024 0943        Implants:  Implant Name Type Inv. Item Serial No.  Lot No. LRB No. Used Action   CEMENT BNE 40GM W/ GENT HI VISC RADPQ FOR REV SURG - [id number]  CEMENT BNE 40GM W/ GENT HI VISC RADPQ FOR REV SURG  [employer]- [id number] Right 1 Implanted   COMPONENT PAT JAD19NL THK8MM KNEE POLY DARÍO CONVENTIONAL - [id number]  COMPONENT PAT ZAP10GO THK8MM KNEE POLY DARÍO CONVENTIONAL  [employer]- [id number] Right 1 Implanted   COMPONENT FEM SZ 6 STD R KNEE CO CHROM CRUCE RET DARÍO PERSONA - [id number]  COMPONENT FEM SZ 6 STD R KNEE CO CHROM CRUCE RET DARÍO PERSONA  [employer]- [id number] Right 1 Implanted   PSN TIB STM 5 DEG SZ D R - [id number]  PSN TIB STM 5 DEG SZ D R  [employer]- [id number] Right 1 Implanted   PSN MC VE ASF R 10MM 6-7/CD - [id number]  PSN MC VE ASF R 10MM 6-7/CD  [employer]- [id number] Right 1 Implanted         Drains:   Closed/Suction Drain Right Knee (Active)       Findings:  Infection Present At Time Of Surgery (PATOS) (choose all levels that have infection present):  No infection present  Other Findings: bone on bone djd    Electronically signed by Milton Billingsley MD on 10/3/2024 at 10:43 AM

## 2024-10-03 NOTE — ANESTHESIA PRE PROCEDURE
Department of Anesthesiology  Preprocedure Note       Name:  Sofia Nunez   Age:  74 y.o.  :  1950                                          MRN:  [id number]         Date:  10/3/2024      Surgeon: Surgeon(s):  Milton Billingsley MD    Procedure: RIGHT KNEE TOTAL ARTHROPLASTY      +++PNB+++          +++JOANIE+++ (Right)    Medications prior to admission:   Prior to Admission medications    Medication Sig Start Date End Date Taking? Authorizing Provider   Multiple Vitamin (MULTIVITAMIN) TABS tablet Take 1 tablet by mouth daily   Yes Dixie Mendoza MD   Probiotic Product (PROBIOTIC ADVANCED PO) Take by mouth   Yes Dixie Mendoza MD   fluticasone (FLONASE) 50 MCG/ACT nasal spray 1 spray by Each Nostril route 2 times daily 6/3/24  Yes Valeriano Robbins DO   metoprolol succinate (TOPROL XL) 25 MG extended release tablet TAKE ONE TABLET BY MOUTH TWO TIMES A DAY  Patient taking differently: daily Indications: taking 1 1/2 tablets daily 24  Yes Ibrahima Calderón DO   levothyroxine (SYNTHROID) 100 MCG tablet One three times a week 24  Yes Valeriano Robbins DO   levothyroxine (SYNTHROID) 88 MCG tablet One four times a week 24  Yes Valeriano Robbins DO   CRANBERRY EXTRACT PO Take 500 mg by mouth   Yes Dixie Mendoza MD   calcium carbonate 600 MG TABS tablet Take 1 tablet by mouth daily   Yes Dixie Mendoza MD   FLUoxetine (PROZAC) 10 MG capsule Take 1 capsule by mouth daily 24   Valeriano Robbins DO       Current medications:    Current Facility-Administered Medications   Medication Dose Route Frequency Provider Last Rate Last Admin    dexAMETHasone (PF) (DECADRON) injection 4 mg  4 mg Other Once Terry Ferguson MD        fentaNYL (SUBLIMAZE) injection 100 mcg  100 mcg IntraVENous Once Terry Ferguson MD        lidocaine 1 % injection 10 mL  10 mL IntraDERmal Once PRN Terry Ferguson MD        ROPivacaine (NAROPIN) 0.5% injection 20 mL  20 mL Infiltration Once PRN

## 2024-10-03 NOTE — ADDENDUM NOTE
Addendum  created 10/03/24 1400 by Ruddy Jacob APRN - CRNA    Child order released for a procedure order, Clinical Note Signed, Intraprocedure Blocks edited, SmartForm saved

## 2024-10-03 NOTE — DISCHARGE INSTRUCTIONS
Discharge Instructions Knee Replacement -Dr. Jose Cruz Kim for DVT prophylaxis x30 days  Pain medication as needed  Ice packs to knee for 30 minutes every 3-4 hours  Keep operative leg elevated on pillows or blankets  Weightbearing as tolerated with walker, transition to cane at discretion of physical therapist  May sleep on either side or supine with pillow between knees  SHABANA hose x2 weeks, off at night  May shower daily  Remove knee bandage on postoperative day #7  home health care, PT and nursing  Return to office in 2 weeks: [phone] ext [phone]

## 2024-10-03 NOTE — ANESTHESIA PROCEDURE NOTES
Peripheral Block    Patient location during procedure: procedure area  Reason for block: post-op pain management and at surgeon's request  Start time: 10/3/2024 8:40 AM  End time: 10/3/2024 8:42 AM  Staffing  Performed: anesthesiologist   Anesthesiologist: Brian Cole DO  Performed by: Brian Cole DO  Authorized by: Brian Cole DO    Preanesthetic Checklist  Completed: patient identified, IV checked, site marked, risks and benefits discussed, surgical/procedural consents, equipment checked, pre-op evaluation, timeout performed, anesthesia consent given, oxygen available and monitors applied/VS acknowledged  Peripheral Block   Patient position: supine  Prep: ChloraPrep  Provider prep: sterile gloves and mask  Patient monitoring: continuous pulse ox, cardiac monitor and IV access  Block type: Femoral  Adductor canal  Laterality: right  Injection technique: single-shot  Guidance: ultrasound guided    Needle   Needle type: combined needle/nerve stimulator   Needle gauge: 22 G  Needle localization: anatomical landmarks and ultrasound guidance  Needle length: 10 cm  Assessment   Injection assessment: negative aspiration for heme, no paresthesia on injection and local visualized surrounding nerve on ultrasound  Paresthesia pain: none  Slow fractionated injection: yes  Hemodynamics: stable  Outcomes: uncomplicated and patient tolerated procedure well    Additional Notes  Block complicated by depth of anatomic structures and therefore decreased US resolution  Medications Administered  ropivacaine (NAROPIN) injection 0.5% - Perineural   30 mL - 10/3/2024 8:41:00 AM

## 2024-10-03 NOTE — ANESTHESIA PROCEDURE NOTES
Spinal Block    Patient location during procedure: OR  Reason for block: primary anesthetic and at surgeon's request  Staffing  Performed: other anesthesia staff   Anesthesiologist: Brian Cole DO  Resident/CRNA: Ruddy Jacob APRN - CRNA  Other anesthesia staff: Socorro Macdonald RN  Performed by: Ruddy Jacob APRN - CRNA  Authorized by: Brian Cole DO    Spinal Block  Prep: ChloraPrep  Patient monitoring: cardiac monitor, continuous pulse ox, continuous capnometry and frequent blood pressure checks  Approach: midline  Location: L3/L4  Provider prep: mask, sterile gloves and sterile gown  Local infiltration: lidocaine  Needle  Needle type: Pencan   Needle length: 3.5 in  Assessment  Swirl obtained: Yes  CSF: clear  Attempts: 1  Hemodynamics: stable  Preanesthetic Checklist  Completed: patient identified, IV checked, site marked, risks and benefits discussed, surgical/procedural consents, equipment checked, pre-op evaluation, timeout performed, anesthesia consent given, oxygen available, monitors applied/VS acknowledged and fire risk safety assessment completed and verbalized

## 2024-10-03 NOTE — ACP (ADVANCE CARE PLANNING)
Advance Care Planning   Healthcare Decision Maker:    Primary Decision Maker: Yury Nunez - [facility] - [phone]    Click here to complete Healthcare Decision Makers including selection of the Healthcare Decision Maker Relationship (ie \"Primary\").

## 2024-10-03 NOTE — ANESTHESIA POSTPROCEDURE EVALUATION
Department of Anesthesiology  Postprocedure Note    Patient: Sofia Nunez  MRN: [id number]  YOB: 1950  Date of evaluation: 10/3/2024    Procedure Summary       Date: 10/03/24 Room / Location: [address]    Anesthesia Start: 0855 Anesthesia Stop: 1057    Procedure: RIGHT KNEE TOTAL ARTHROPLASTY (Right: Knee) Diagnosis:       Osteoarthritis of right knee, unspecified osteoarthritis type      (Osteoarthritis of right knee, unspecified osteoarthritis type [M17.11])    Surgeons: Milton Billingsley MD Responsible Provider: Brian Cole DO    Anesthesia Type: Spinal ASA Status: 3            Anesthesia Type: Spinal    Hill Phase I: Hill Score: 10    Hill Phase II:      Anesthesia Post Evaluation    Patient location during evaluation: bedside  Patient participation: complete - patient participated  Level of consciousness: awake  Pain score: 2  Airway patency: patent  Nausea & Vomiting: no vomiting and no nausea  Cardiovascular status: hemodynamically stable  Respiratory status: acceptable  Hydration status: stable  Comments: Patient seen and examined.  Progressing as expected.  No anesthetic related questions or concerns at this time.  Multimodal analgesia pain management approach  Pain management: adequate    No notable events documented.

## 2024-10-04 VITALS
BODY MASS INDEX: 31.22 KG/M2 | RESPIRATION RATE: 15 BRPM | HEART RATE: 67 BPM | DIASTOLIC BLOOD PRESSURE: 60 MMHG | OXYGEN SATURATION: 98 % | HEIGHT: 60 IN | SYSTOLIC BLOOD PRESSURE: 110 MMHG | TEMPERATURE: 98.1 F | WEIGHT: 159 LBS

## 2024-10-04 LAB
ANION GAP SERPL CALCULATED.3IONS-SCNC: 8 MMOL/L (ref 7–16)
BUN SERPL-MCNC: 13 MG/DL (ref 6–23)
CALCIUM SERPL-MCNC: 8.9 MG/DL (ref 8.6–10.2)
CHLORIDE SERPL-SCNC: 104 MMOL/L (ref 98–107)
CO2 SERPL-SCNC: 25 MMOL/L (ref 22–29)
CREAT SERPL-MCNC: 0.8 MG/DL (ref 0.5–1)
ERYTHROCYTE [DISTWIDTH] IN BLOOD BY AUTOMATED COUNT: 13.2 % (ref 11.5–15)
GFR, ESTIMATED: 77 ML/MIN/1.73M2
GLUCOSE SERPL-MCNC: 189 MG/DL (ref 74–99)
HCT VFR BLD AUTO: 30.3 % (ref 34–48)
HGB BLD-MCNC: 9.7 G/DL (ref 11.5–15.5)
MCH RBC QN AUTO: 28.2 PG (ref 26–35)
MCHC RBC AUTO-ENTMCNC: 32 G/DL (ref 32–34.5)
MCV RBC AUTO: 88.1 FL (ref 80–99.9)
PLATELET # BLD AUTO: 234 K/UL (ref 130–450)
PMV BLD AUTO: 11.1 FL (ref 7–12)
POTASSIUM SERPL-SCNC: 4.7 MMOL/L (ref 3.5–5)
RBC # BLD AUTO: 3.44 M/UL (ref 3.5–5.5)
SODIUM SERPL-SCNC: 137 MMOL/L (ref 132–146)
WBC OTHER # BLD: 15.6 K/UL (ref 4.5–11.5)

## 2024-10-04 PROCEDURE — 2580000003 HC RX 258: Performed by: ORTHOPAEDIC SURGERY

## 2024-10-04 PROCEDURE — 96374 THER/PROPH/DIAG INJ IV PUSH: CPT

## 2024-10-04 PROCEDURE — 97535 SELF CARE MNGMENT TRAINING: CPT

## 2024-10-04 PROCEDURE — 85027 COMPLETE CBC AUTOMATED: CPT

## 2024-10-04 PROCEDURE — 6360000002 HC RX W HCPCS: Performed by: ORTHOPAEDIC SURGERY

## 2024-10-04 PROCEDURE — G0378 HOSPITAL OBSERVATION PER HR: HCPCS

## 2024-10-04 PROCEDURE — 6370000000 HC RX 637 (ALT 250 FOR IP): Performed by: ORTHOPAEDIC SURGERY

## 2024-10-04 PROCEDURE — 96376 TX/PRO/DX INJ SAME DRUG ADON: CPT

## 2024-10-04 PROCEDURE — 97530 THERAPEUTIC ACTIVITIES: CPT

## 2024-10-04 PROCEDURE — 97110 THERAPEUTIC EXERCISES: CPT

## 2024-10-04 PROCEDURE — 80048 BASIC METABOLIC PNL TOTAL CA: CPT

## 2024-10-04 RX ORDER — OXYCODONE HYDROCHLORIDE 5 MG/1
5 TABLET ORAL EVERY 4 HOURS PRN
Qty: 42 TABLET | Refills: 0 | Status: SHIPPED | OUTPATIENT
Start: 2024-10-04 | End: 2024-10-11

## 2024-10-04 RX ORDER — SENNA AND DOCUSATE SODIUM 50; 8.6 MG/1; MG/1
1 TABLET, FILM COATED ORAL 2 TIMES DAILY
Qty: 60 TABLET | Refills: 0 | Status: SHIPPED | OUTPATIENT
Start: 2024-10-04

## 2024-10-04 RX ADMIN — KETOROLAC TROMETHAMINE 15 MG: 15 INJECTION, SOLUTION INTRAMUSCULAR; INTRAVENOUS at 01:44

## 2024-10-04 RX ADMIN — LEVOTHYROXINE SODIUM 88 MCG: 0.09 TABLET ORAL at 06:15

## 2024-10-04 RX ADMIN — DOCUSATE SODIUM 50 MG AND SENNOSIDES 8.6 MG 1 TABLET: 8.6; 5 TABLET, FILM COATED ORAL at 09:29

## 2024-10-04 RX ADMIN — FLUTICASONE PROPIONATE 1 SPRAY: 50 SPRAY, METERED NASAL at 09:28

## 2024-10-04 RX ADMIN — KETOROLAC TROMETHAMINE 15 MG: 15 INJECTION, SOLUTION INTRAMUSCULAR; INTRAVENOUS at 08:17

## 2024-10-04 RX ADMIN — FAMOTIDINE 20 MG: 20 TABLET ORAL at 09:29

## 2024-10-04 RX ADMIN — SODIUM CHLORIDE, PRESERVATIVE FREE 10 ML: 5 INJECTION INTRAVENOUS at 09:42

## 2024-10-04 RX ADMIN — BENZOCAINE AND MENTHOL 1 LOZENGE: 15; 3.6 LOZENGE ORAL at 01:49

## 2024-10-04 RX ADMIN — METOPROLOL SUCCINATE 25 MG: 25 TABLET, FILM COATED, EXTENDED RELEASE ORAL at 09:29

## 2024-10-04 RX ADMIN — WATER 2000 MG: 1 INJECTION INTRAMUSCULAR; INTRAVENOUS; SUBCUTANEOUS at 01:44

## 2024-10-04 RX ADMIN — OXYCODONE 5 MG: 5 TABLET ORAL at 09:29

## 2024-10-04 RX ADMIN — APIXABAN 2.5 MG: 2.5 TABLET, FILM COATED ORAL at 09:29

## 2024-10-04 RX ADMIN — ACETAMINOPHEN 650 MG: 325 TABLET ORAL at 01:44

## 2024-10-04 RX ADMIN — ACETAMINOPHEN 650 MG: 325 TABLET ORAL at 06:16

## 2024-10-04 ASSESSMENT — PAIN DESCRIPTION - LOCATION
LOCATION: KNEE
LOCATION: THROAT
LOCATION: KNEE

## 2024-10-04 ASSESSMENT — PAIN DESCRIPTION - DESCRIPTORS
DESCRIPTORS: SORE
DESCRIPTORS: DISCOMFORT
DESCRIPTORS: PRESSURE

## 2024-10-04 ASSESSMENT — PAIN DESCRIPTION - ORIENTATION
ORIENTATION: MID;RIGHT
ORIENTATION: RIGHT

## 2024-10-04 ASSESSMENT — PAIN DESCRIPTION - PAIN TYPE
TYPE: SURGICAL PAIN

## 2024-10-04 ASSESSMENT — PAIN SCALES - GENERAL
PAINLEVEL_OUTOF10: 0
PAINLEVEL_OUTOF10: 1
PAINLEVEL_OUTOF10: 2
PAINLEVEL_OUTOF10: 1

## 2024-10-04 NOTE — PROGRESS NOTES
Physical Therapy  Facility/Department: [address] MED SURG  Physical Therapy Treatment Note    Name: Sofia Nunez  : 1950  MRN: [id number]  Date of Service: 10/4/2024    Attending Provider:  Milton Billingsley MD    Evaluating PT:  Abhishek Aguilar Jr., P.T.    Room #:  45/45-  Diagnosis:  Osteoarthritis of right knee, unspecified osteoarthritis type [M17.11]  Procedure/Surgery:  R TKA 10/3/24  Precautions:  WBAT RLE  Equipment Needs:  wheeled walker    SUBJECTIVE:    Pt lives with her  in a 1 story condo with 1 step to enter.  Pt ambulated with a cane PTA.    OBJECTIVE:   Initial Evaluation  Date: 10/3/24 Treatment  10/4/2024 Short Term/ Long Term   Goals   Was pt agreeable to Eval/treatment? yes yes    Does pt have pain? No c/o pain R knee stiffness, mild pain    Bed Mobility  Rolling: Independent  Supine to sit: Independent  Sit to supine: Independent  Scooting: Independent Supine < >sit: Independent  Scooting: Independent seated to EOB Independent   Transfers Sit to stand: SBA  Stand to sit: SBA  Stand pivot: SBA with ww Sit <> stand: Supervision  Stand pivot; SBA with WW Independent    Ambulation   15+200 feet with ww  + 250 feet with WW  feet with ww Independent    Stair negotiation: ascended and descended NA 4 steps with B rails, SBA, 6\" platform step x 2 with WW SBA, step to pattern used 3 steps with 2 rails and 1 step with ww Independent    AM-PAC 6 Clicks       Pt is alert, following instruction  Balance: fair dynamic with WW    Pt performed therapeutic exercise of the following as per written HEP: seated B ankle pumps, R LE quad sets x 20; pillow case slides x 30 AROM    Patient education/treatment  Pt was educated on therapeutic exercise for circulation/ROM/strengthening, UE usage transfer safety, gait mechanics for posture, step negotiation for sequence, car transfer to back into the front passenger seat.    Patient response to education:   Pt

## 2024-10-04 NOTE — PLAN OF CARE
Problem: Chronic Conditions and Co-morbidities  Goal: Patient's chronic conditions and co-morbidity symptoms are monitored and maintained or improved  10/4/2024 0039 by Mary Lou Goldstein RN  Outcome: Progressing  10/3/2024 1442 by Obdulio López RN  Outcome: Progressing     Problem: Discharge Planning  Goal: Discharge to home or other facility with appropriate resources  10/4/2024 0039 by Mary Lou Goldstein RN  Outcome: Progressing  10/3/2024 1442 by Obdulio López RN  Outcome: Progressing     Problem: Pain  Goal: Verbalizes/displays adequate comfort level or baseline comfort level  10/4/2024 0039 by Mary Lou Goldstein RN  Outcome: Progressing  10/3/2024 1442 by Obdulio López RN  Outcome: Progressing     Problem: Safety - Adult  Goal: Free from fall injury  10/4/2024 0039 by Mary Lou Goldstein RN  Outcome: Progressing  10/3/2024 1442 by Obdulio López RN  Outcome: Progressing     Problem: ABCDS Injury Assessment  Goal: Absence of physical injury  10/4/2024 0039 by Mary Lou Goldstein RN  Outcome: Progressing  10/3/2024 1442 by Obdulio López RN  Outcome: Progressing

## 2024-10-04 NOTE — PROGRESS NOTES
Total Joint    Post op Day 1      S: Patient is 1 day status post right total knee arthroplasty.  Doing well this morning.  Having only minimal pain.  Denies nausea and vomiting, tolerating her meals.  Worked with physical therapy yesterday and was able to walk in the hallway.  Plans on working with them again today.  Looking forward to discharge home today.    O:     Vitals:    10/04/24 0700   BP: 117/62   Pulse: 63   Resp: 16   Temp: 97.5 °F (36.4 °C)   SpO2: 98%        Alert.  Pleasant and cooperative throughout the exam.     Aquacel and ace wrap at the right knee are clean, dry and intact.  No shadowing noted.   Able to perform a straight leg raise   Right ankle dorsiflexion and plantarflexion intact.     Strength and sensation grossly intact.   Bilateral calves are supple and nontender.   Patient can wiggle their toes.   Bilateral lower extremities neurovascularly intact.       H/H:   Recent Labs     10/04/24  0445   HGB 9.7*   HCT 30.3*           A/P:   1 day status post right total knee arthroplasty   Stable   Continue PT/OT, WBAT RLE   Continue Aquacel dressing, okay to shower if intact.  To be removed postop day 7   Continue oxycodone for pain control   Eliquis 2.5 mg twice daily for DVT prophylaxis   Progress diet as tolerated   D/c planning-plan for discharge home later today

## 2024-10-04 NOTE — OP NOTE
[place] [address]                            OPERATIVE REPORT      PATIENT NAME: OKSANA SUAREZ            : 1950  MED REC NO: [id number]                        ROOM: [facility]  ACCOUNT NO: [id number]                       ADMIT DATE: 10/03/2024  PROVIDER: Milton Billingsley MD      DATE OF PROCEDURE:  10/03/2024    SURGEON:  Milton Billingsley MD    PREOPERATIVE DIAGNOSIS:  Severe right knee osteoarthritis.    POSTOPERATIVE DIAGNOSIS:  Severe right knee osteoarthritis.    PROCEDURE:  Right total knee arthroplasty.    ASSISTANT:  Roula Harris PA-C.  No qualified surgical resident available.    ANESTHESIA:  Spinal plus peripheral nerve block.    ESTIMATED BLOOD LOSS:  50.    SPECIMEN:  Bone cuts, right knee.    DRAINS:  1/8th inch Hemovac.    COMPLICATIONS:  None.    CONDITION:  Stable to recovery room.    IMPLANTS:    1. Janeth Persona size 6 right standard CR femur, size D right keeled tibia, 29 mm domed patella, 10 mm MC bearing.    2. Janeth with antibiotic bone cement x1 package.    INDICATION FOR PROCEDURE:  The patient is a 74-year-old woman with severe right knee pain secondary to bone-on-bone osteoarthritis, unresponsive to conservative treatment.  The risks, benefits, alternatives, and limitations to total knee arthroplasty were discussed and informed consent obtained.    DESCRIPTION OF PROCEDURE:  The patient was met in preoperative holding area.  Right knee was marked as correct operative site.  Peripheral nerve block given by Anesthesia Department.  Taken to the operating room where spinal anesthesia was administered.  Intravenous antibiotics and TXA were given per protocol.  Tourniquet placed on the proximal right thigh.  Right lower extremity prepped and draped in usual sterile fashion.  Following surgical time-out, the limb was elevated and exsanguinated with an Esmarch.  Tourniquet inflated to 250 mmHg.

## 2024-10-04 NOTE — PROGRESS NOTES
Occupational Therapy  OT BEDSIDE TREATMENT NOTE      Date:10/4/2024  Patient Name: Sofia Nunez  MRN: [id number]  : 1950  Room: [address] [place] [address]       Evaluating OT: Martha Ontiveros, OTR/L - [id number]     Referring Provider: Milton Billingsley MD  Specific Provider Orders/Date: \"OT eval and treat\" - 10/3/2024     Diagnosis: Osteoarthritis of right knee, unspecified osteoarthritis type [M17.11]      Surgery: Patient underwent R TKA on 10/3/2024.     Pertinent Medical History: cancer, depression, OA      Precautions: fall risk, FWBAT     Assessment of Current Deficits:    [x] Functional mobility             [x] ADLs          [x] Strength                  [] Cognition   [x] Functional transfers           [x] IADLs         [x] Safety Awareness   [x] Endurance   [] Fine Motor Coordination    [x] Balance      [] Vision/Perception   [x] Sensation    [] Gross Motor Coordination [] ROM          [] Delirium                  [] Motor Control      OT PLAN OF CARE   OT POC is based on physician orders, patient diagnosis, and results of clinical assessment.  Frequency/Duration 2-5 days/week for 2-4 weeks PRN   Specific OT Treatment Interventions to Include:   * Instruction/training on adapted ADL techniques and AE recommendations to increase functional independence within precautions       * Training on energy conservation strategies, correct breathing pattern and techniques to improve independence/tolerance for self-care routine  * Functional transfer/mobility training/DME recommendations for increased independence, safety, and fall prevention  * Patient/Family education to increase follow through with safety techniques and functional independence  * Recommendation of environmental modifications for increased safety with functional transfers/mobility and ADLs  * Therapeutic exercise to improve motor endurance, ROM, and functional strength for ADLs/functional transfers  * Therapeutic activities to facilitate/challenge

## 2024-10-04 NOTE — PROGRESS NOTES
[facility] Quality Flow/Interdisciplinary Rounds Progress Note        Quality Flow Rounds held on October 4, 2024    Disciplines Attending:  Bedside Nurse, , , and Nursing Unit Leadership    Sofia Nunez was admitted on 10/3/2024  6:09 AM    Anticipated Discharge Date:  Expected Discharge Date: 10/04/24    Disposition:    Michael Score:  Michael Scale Score: 20    Readmission Risk              Risk of Unplanned Readmission:  0           Discussed patient goal for the day, patient clinical progression, and barriers to discharge.  The following Goal(s) of the Day/Commitment(s) have been identified:   discharge planning, home today      Wesley Soto, KOBI  October 4, 2024

## 2024-10-08 LAB — SURGICAL PATHOLOGY REPORT: NORMAL

## 2024-10-08 NOTE — PROGRESS NOTES
CLINICAL PHARMACY NOTE: MEDS TO BEDS    Total # of Prescriptions Filled: 3   The following medications were delivered to the patient:  Oxycodone 5 mg   Senna plus 8.6/50 mg  Eliquis 2.5 mg     Additional Documentation:

## 2025-01-17 DIAGNOSIS — E53.8 VITAMIN B 12 DEFICIENCY: ICD-10-CM

## 2025-01-17 DIAGNOSIS — M81.0 AGE-RELATED OSTEOPOROSIS WITHOUT CURRENT PATHOLOGICAL FRACTURE: ICD-10-CM

## 2025-01-17 DIAGNOSIS — N30.00 ACUTE CYSTITIS WITHOUT HEMATURIA: ICD-10-CM

## 2025-01-17 DIAGNOSIS — I10 ESSENTIAL HYPERTENSION: Chronic | ICD-10-CM

## 2025-01-17 DIAGNOSIS — E11.9 DIET-CONTROLLED DIABETES MELLITUS (HCC): ICD-10-CM

## 2025-01-17 DIAGNOSIS — E03.9 ACQUIRED HYPOTHYROIDISM: Chronic | ICD-10-CM

## 2025-01-17 LAB
BACTERIA: ABNORMAL
BASOPHILS ABSOLUTE: 0.04 K/UL (ref 0–0.2)
BASOPHILS RELATIVE PERCENT: 1 % (ref 0–2)
BILIRUBIN, URINE: NEGATIVE
COLOR, UA: YELLOW
EOSINOPHILS ABSOLUTE: 0.19 K/UL (ref 0.05–0.5)
EOSINOPHILS RELATIVE PERCENT: 3 % (ref 0–6)
GLUCOSE URINE: NEGATIVE MG/DL
HCT VFR BLD CALC: 41.5 % (ref 34–48)
HEMOGLOBIN: 12.8 G/DL (ref 11.5–15.5)
IMMATURE GRANULOCYTES %: 0 % (ref 0–5)
IMMATURE GRANULOCYTES ABSOLUTE: 0.03 K/UL (ref 0–0.58)
KETONES, URINE: NEGATIVE MG/DL
LEUKOCYTE ESTERASE, URINE: NEGATIVE
LYMPHOCYTES ABSOLUTE: 1.95 K/UL (ref 1.5–4)
LYMPHOCYTES RELATIVE PERCENT: 28 % (ref 20–42)
MCH RBC QN AUTO: 27.1 PG (ref 26–35)
MCHC RBC AUTO-ENTMCNC: 30.8 G/DL (ref 32–34.5)
MCV RBC AUTO: 87.7 FL (ref 80–99.9)
MONOCYTES ABSOLUTE: 0.58 K/UL (ref 0.1–0.95)
MONOCYTES RELATIVE PERCENT: 8 % (ref 2–12)
NEUTROPHILS ABSOLUTE: 4.26 K/UL (ref 1.8–7.3)
NEUTROPHILS RELATIVE PERCENT: 60 % (ref 43–80)
NITRITE, URINE: NEGATIVE
PDW BLD-RTO: 14 % (ref 11.5–15)
PH, URINE: 6 (ref 5–9)
PLATELET # BLD: 302 K/UL (ref 130–450)
PMV BLD AUTO: 11.7 FL (ref 7–12)
PROTEIN UA: NEGATIVE MG/DL
RBC # BLD: 4.73 M/UL (ref 3.5–5.5)
RBC UA: ABNORMAL /HPF
SPECIFIC GRAVITY UA: 1.02 (ref 1–1.03)
TURBIDITY: CLEAR
URINE HGB: ABNORMAL
UROBILINOGEN, URINE: 0.2 EU/DL (ref 0–1)
WBC # BLD: 7.1 K/UL (ref 4.5–11.5)
WBC UA: ABNORMAL /HPF

## 2025-01-18 LAB
ALBUMIN: 3.9 G/DL (ref 3.5–5.2)
ALP BLD-CCNC: 166 U/L (ref 35–104)
ALT SERPL-CCNC: 24 U/L (ref 0–32)
ANION GAP SERPL CALCULATED.3IONS-SCNC: 14 MMOL/L (ref 7–16)
AST SERPL-CCNC: 29 U/L (ref 0–31)
BILIRUB SERPL-MCNC: 0.4 MG/DL (ref 0–1.2)
BUN BLDV-MCNC: 15 MG/DL (ref 6–23)
CALCIUM SERPL-MCNC: 9.4 MG/DL (ref 8.6–10.2)
CHLORIDE BLD-SCNC: 102 MMOL/L (ref 98–107)
CHOLESTEROL, TOTAL: 165 MG/DL
CO2: 26 MMOL/L (ref 22–29)
CREAT SERPL-MCNC: 0.7 MG/DL (ref 0.5–1)
GFR, ESTIMATED: 87 ML/MIN/1.73M2
GLUCOSE BLD-MCNC: 96 MG/DL (ref 74–99)
HBA1C MFR BLD: 6.3 % (ref 4–5.6)
HDLC SERPL-MCNC: 39 MG/DL
LDL CHOLESTEROL: 99 MG/DL
POTASSIUM SERPL-SCNC: 4.5 MMOL/L (ref 3.5–5)
SODIUM BLD-SCNC: 142 MMOL/L (ref 132–146)
THYROXINE (T4): 6.6 UG/DL (ref 4.5–11.7)
TOTAL PROTEIN: 6.5 G/DL (ref 6.4–8.3)
TRIGL SERPL-MCNC: 137 MG/DL
TSH SERPL DL<=0.05 MIU/L-ACNC: 3.45 UIU/ML (ref 0.27–4.2)
VITAMIN B-12: 292 PG/ML (ref 211–946)
VITAMIN D 25-HYDROXY: 33 NG/ML (ref 30–100)
VLDLC SERPL CALC-MCNC: 27 MG/DL

## 2025-01-19 LAB
CULTURE: NORMAL
SPECIMEN DESCRIPTION: NORMAL

## 2025-01-22 ENCOUNTER — OFFICE VISIT (OUTPATIENT)
Dept: PRIMARY CARE CLINIC | Age: 75
End: 2025-01-22

## 2025-01-22 VITALS
TEMPERATURE: 97.7 F | BODY MASS INDEX: 29.64 KG/M2 | WEIGHT: 151 LBS | DIASTOLIC BLOOD PRESSURE: 68 MMHG | RESPIRATION RATE: 18 BRPM | HEART RATE: 85 BPM | HEIGHT: 60 IN | OXYGEN SATURATION: 99 % | SYSTOLIC BLOOD PRESSURE: 124 MMHG

## 2025-01-22 DIAGNOSIS — E78.2 MIXED HYPERLIPIDEMIA: Chronic | ICD-10-CM

## 2025-01-22 DIAGNOSIS — E55.9 VITAMIN D DEFICIENCY: ICD-10-CM

## 2025-01-22 DIAGNOSIS — E03.9 ACQUIRED HYPOTHYROIDISM: Primary | Chronic | ICD-10-CM

## 2025-01-22 DIAGNOSIS — E53.8 VITAMIN B 12 DEFICIENCY: ICD-10-CM

## 2025-01-22 DIAGNOSIS — E11.9 DIET-CONTROLLED DIABETES MELLITUS (HCC): ICD-10-CM

## 2025-01-22 DIAGNOSIS — I10 ESSENTIAL HYPERTENSION: Chronic | ICD-10-CM

## 2025-01-22 DIAGNOSIS — M47.816 SPONDYLOSIS OF LUMBAR REGION WITHOUT MYELOPATHY OR RADICULOPATHY: Chronic | ICD-10-CM

## 2025-01-22 PROBLEM — M17.11 OSTEOARTHRITIS OF RIGHT KNEE, UNSPECIFIED OSTEOARTHRITIS TYPE: Status: RESOLVED | Noted: 2024-10-03 | Resolved: 2025-01-22

## 2025-01-22 SDOH — ECONOMIC STABILITY: FOOD INSECURITY: WITHIN THE PAST 12 MONTHS, THE FOOD YOU BOUGHT JUST DIDN'T LAST AND YOU DIDN'T HAVE MONEY TO GET MORE.: NEVER TRUE

## 2025-01-22 SDOH — ECONOMIC STABILITY: FOOD INSECURITY: WITHIN THE PAST 12 MONTHS, YOU WORRIED THAT YOUR FOOD WOULD RUN OUT BEFORE YOU GOT MONEY TO BUY MORE.: NEVER TRUE

## 2025-01-22 ASSESSMENT — PATIENT HEALTH QUESTIONNAIRE - PHQ9
1. LITTLE INTEREST OR PLEASURE IN DOING THINGS: NOT AT ALL
SUM OF ALL RESPONSES TO PHQ QUESTIONS 1-9: 0
SUM OF ALL RESPONSES TO PHQ9 QUESTIONS 1 & 2: 0
SUM OF ALL RESPONSES TO PHQ QUESTIONS 1-9: 0
SUM OF ALL RESPONSES TO PHQ QUESTIONS 1-9: 0
2. FEELING DOWN, DEPRESSED OR HOPELESS: NOT AT ALL
SUM OF ALL RESPONSES TO PHQ QUESTIONS 1-9: 0

## 2025-01-22 NOTE — PROGRESS NOTES
25  Name: Sofia Nunez    : 1950    Sex: female    Age: 74 y.o.        Subjective:  Chief Complaint: Patient is here for 6 mo ck  re      bp  chol   thryoid  anx  sugar    arth     Feel ok   here with hus  Righ tkene ok   seeign pt for   calf  She had braca test      Review of Systems   Constitutional: Negative.    HENT: Negative.     Eyes: Negative.    Respiratory: Negative.     Cardiovascular: Negative.    Gastrointestinal: Negative.    Endocrine: Negative.    Genitourinary: Negative.    Musculoskeletal:  Positive for arthralgias.   Skin: Negative.    Allergic/Immunologic: Negative.    Neurological: Negative.    Hematological: Negative.    Psychiatric/Behavioral: Negative.           Current Outpatient Medications:   •  apixaban (ELIQUIS) 2.5 MG TABS tablet, Take 1 tablet by mouth 2 times daily For DVT prophylaxis, Disp: 60 tablet, Rfl: 0  •  sennosides-docusate sodium (SENOKOT-S) 8.6-50 MG tablet, Take 1 tablet by mouth 2 times daily For prevention of constipation, Disp: 60 tablet, Rfl: 0  •  Multiple Vitamin (MULTIVITAMIN) TABS tablet, Take 1 tablet by mouth daily, Disp: , Rfl:   •  Probiotic Product (PROBIOTIC ADVANCED PO), Take by mouth, Disp: , Rfl:   •  fluticasone (FLONASE) 50 MCG/ACT nasal spray, 1 spray by Each Nostril route 2 times daily, Disp: 1 each, Rfl: 5  •  metoprolol succinate (TOPROL XL) 25 MG extended release tablet, TAKE ONE TABLET BY MOUTH TWO TIMES A DAY (Patient taking differently: daily Indications: taking 1 1/2 tablets daily), Disp: 180 tablet, Rfl: 1  •  levothyroxine (SYNTHROID) 100 MCG tablet, One three times a week, Disp: 50 tablet, Rfl: 12  •  FLUoxetine (PROZAC) 10 MG capsule, Take 1 capsule by mouth daily, Disp: 90 capsule, Rfl: 3  •  levothyroxine (SYNTHROID) 88 MCG tablet, One four times a week, Disp: 80 tablet, Rfl: 3  •  CRANBERRY EXTRACT PO, Take 500 mg by mouth, Disp: , Rfl:   •  calcium carbonate 600 MG TABS tablet, Take 1 tablet by mouth daily, Disp: , Rfl:

## 2025-03-04 DIAGNOSIS — I10 ESSENTIAL HYPERTENSION: Chronic | ICD-10-CM

## 2025-03-04 RX ORDER — METOPROLOL SUCCINATE 25 MG/1
TABLET, EXTENDED RELEASE ORAL
Qty: 180 TABLET | Refills: 3 | Status: SHIPPED | OUTPATIENT
Start: 2025-03-04

## 2025-03-05 DIAGNOSIS — F41.9 ANXIETY: ICD-10-CM

## 2025-03-05 DIAGNOSIS — E03.9 ACQUIRED HYPOTHYROIDISM: Chronic | ICD-10-CM

## 2025-03-05 RX ORDER — FLUOXETINE 10 MG/1
10 CAPSULE ORAL DAILY
Qty: 90 CAPSULE | Refills: 3 | Status: SHIPPED | OUTPATIENT
Start: 2025-03-05

## 2025-03-05 RX ORDER — LEVOTHYROXINE SODIUM 100 UG/1
TABLET ORAL
Qty: 50 TABLET | Refills: 12 | Status: SHIPPED | OUTPATIENT
Start: 2025-03-05

## 2025-03-05 RX ORDER — LEVOTHYROXINE SODIUM 88 UG/1
TABLET ORAL
Qty: 80 TABLET | Refills: 3 | Status: SHIPPED | OUTPATIENT
Start: 2025-03-05

## 2025-03-27 ENCOUNTER — OFFICE VISIT (OUTPATIENT)
Dept: PRIMARY CARE CLINIC | Age: 75
End: 2025-03-27

## 2025-03-27 VITALS
WEIGHT: 151 LBS | BODY MASS INDEX: 29.49 KG/M2 | TEMPERATURE: 97.3 F | OXYGEN SATURATION: 96 % | HEART RATE: 70 BPM | RESPIRATION RATE: 16 BRPM

## 2025-03-27 DIAGNOSIS — H92.03 OTALGIA OF BOTH EARS: ICD-10-CM

## 2025-03-27 DIAGNOSIS — M47.816 SPONDYLOSIS OF LUMBAR REGION WITHOUT MYELOPATHY OR RADICULOPATHY: Chronic | ICD-10-CM

## 2025-03-27 DIAGNOSIS — M51.16 HERNIATION OF LUMBAR INTERVERTEBRAL DISC WITH RADICULOPATHY: Primary | ICD-10-CM

## 2025-03-27 DIAGNOSIS — M15.0 PRIMARY OSTEOARTHRITIS INVOLVING MULTIPLE JOINTS: ICD-10-CM

## 2025-03-27 NOTE — PROGRESS NOTES
Sofia Nunez (:  1950) is a 74 y.o. female,    here for evaluation of the following chief complaint(s):  Other (Aggravating gnaw behind right knee to ankle)            Subjective   History of Present Illness  The patient presents for evaluation of right knee pain, back pain, and hives.    A right knee surgical procedure was performed in 10/2024. Persistent pain in the medial aspect of the right knee has been reported since the second physical therapy session post-surgery. Additionally, discomfort in the calf extending to the ankle is described as an aggravating sensation rather than a sharp pain. A previous diagnosis of a meniscus tear behind the knee is noted, and there is speculation if this could be contributing to the current symptoms. The possibility of lumbar radiculitis is also questioned. Epidural injections for back pain have been previously administered by Dr. Carroll.    Hives developed following the administration of an antibiotic prescribed by Dr. March for a dental procedure on 2025. Despite being advised to take Benadryl concurrently, the hives persist. A history of taking amoxicillin and clindamycin without adverse reactions is noted.    A referral to Dr. Alicea is requested for an ear examination and nasal growth assessment.    PAST SURGICAL HISTORY:  - Right knee surgery in 10/2024  - Epidural injections for back pain    Review of Systems:  Constitutional:  No fever, no fatigue, no chills, no headaches, no weight change  Dermatology:  No rash, no mole, no dry or sensitive skin  ENT:  No cough, no sore throat, no sinus pain, no runny nose, no ear pain  Cardiology:  No chest pain, no palpitations, no leg edema, no shortness of breath, no PND  Gastroenterology:  No dysphagia, no abdominal pain, no nausea, no vomiting, no constipation, no diarrhea, no heartburn  Musculoskeletal:  No joint pain, no leg cramps, no back pain, no muscle aches  Respiratory:  No shortness of breath,

## 2025-03-28 ENCOUNTER — OFFICE VISIT (OUTPATIENT)
Dept: ENT CLINIC | Age: 75
End: 2025-03-28
Payer: MEDICARE

## 2025-03-28 ENCOUNTER — HOSPITAL ENCOUNTER (OUTPATIENT)
Dept: MRI IMAGING | Age: 75
Discharge: HOME | End: 2025-03-30
Payer: MEDICARE

## 2025-03-28 ENCOUNTER — PROCEDURE VISIT (OUTPATIENT)
Dept: AUDIOLOGY | Age: 75
End: 2025-03-28

## 2025-03-28 VITALS
DIASTOLIC BLOOD PRESSURE: 76 MMHG | WEIGHT: 158 LBS | TEMPERATURE: 97.2 F | BODY MASS INDEX: 31.02 KG/M2 | HEIGHT: 60 IN | RESPIRATION RATE: 16 BRPM | HEART RATE: 65 BPM | SYSTOLIC BLOOD PRESSURE: 144 MMHG | OXYGEN SATURATION: 95 %

## 2025-03-28 DIAGNOSIS — H90.3 SENSORINEURAL HEARING LOSS (SNHL) OF BOTH EARS: Primary | ICD-10-CM

## 2025-03-28 DIAGNOSIS — M51.16 HERNIATION OF LUMBAR INTERVERTEBRAL DISC WITH RADICULOPATHY: ICD-10-CM

## 2025-03-28 PROCEDURE — 3077F SYST BP >= 140 MM HG: CPT

## 2025-03-28 PROCEDURE — 99213 OFFICE O/P EST LOW 20 MIN: CPT

## 2025-03-28 PROCEDURE — G8427 DOCREV CUR MEDS BY ELIG CLIN: HCPCS

## 2025-03-28 PROCEDURE — G8399 PT W/DXA RESULTS DOCUMENT: HCPCS

## 2025-03-28 PROCEDURE — 72148 MRI LUMBAR SPINE W/O DYE: CPT

## 2025-03-28 PROCEDURE — 3078F DIAST BP <80 MM HG: CPT

## 2025-03-28 PROCEDURE — 1036F TOBACCO NON-USER: CPT

## 2025-03-28 PROCEDURE — 1160F RVW MEDS BY RX/DR IN RCRD: CPT

## 2025-03-28 PROCEDURE — 1090F PRES/ABSN URINE INCON ASSESS: CPT

## 2025-03-28 PROCEDURE — G8417 CALC BMI ABV UP PARAM F/U: HCPCS

## 2025-03-28 PROCEDURE — 1123F ACP DISCUSS/DSCN MKR DOCD: CPT

## 2025-03-28 PROCEDURE — 1159F MED LIST DOCD IN RCRD: CPT

## 2025-03-28 ASSESSMENT — ENCOUNTER SYMPTOMS
EYE PAIN: 0
ALLERGIC/IMMUNOLOGIC NEGATIVE: 1
BACK PAIN: 0
RHINORRHEA: 0
SORE THROAT: 0
VOMITING: 0
SINUS PRESSURE: 0
COUGH: 0
SHORTNESS OF BREATH: 0
EYE DISCHARGE: 0
DIARRHEA: 0

## 2025-03-28 NOTE — PROGRESS NOTES
Subjective:     Patient ID:  Sofia Nunez is a 74 y.o. female.    HPI:    Hearing Loss  Patient presents today with complaints of hearing loss.  Concern regarding hearing has been present for 5 years. She has failed a prior hearing test.The patient reports difficulty hearing, turning up the T.V., saying \"huh\" or \"what\".     Patient is currently wearing a hearing aid in the bilateral ear.  History of Head trauma: no   Description: none  History of surgery to the head/neck: no   Description:none  History of cerumen impaction: yes  History of noise exposure: yes   Type: Grandchild  Spinning: no  Hearing loss: yes    Fluctuating: no  Aural pressure: yes - right  Tinnitus:no  Otalgia:no    Has hearing aids.   Current set is about 4-5 years of age.   Reports she has trouble hearing her husbands voice as well as hearing issue in areas with background noise.     Past Medical History:   Diagnosis Date    Allergic urticaria     Cancer (HCC)     1999 right breast cancer / treated with surgery and chemo    Depression 2000    Diet-controlled diabetes mellitus (HCC)     Hyperlipidemia     DENIES    Hypothyroidism     Osteoarthritis cervical spine     cervical spine    Osteoarthritis of right knee, unspecified osteoarthritis type 10/03/2024    Pancreatic cancer (HCC) 2007    Partial Whipple Procedure - [facility]    Pancreatitis, acute 06/06/2012    Prolonged emergence from general anesthesia      Past Surgical History:   Procedure Laterality Date    BREAST RECONSTRUCTION Bilateral 07/25/2019    CAPSULECTOMY OF LEFT BREAST WITH REMOVAL OF SALINE  IMPLANTS AND REPLACMENT WITH BILATERAL SILICONE IMPLANTS performed by Balaji Villafuerte MD at [facility] OR    CATARACT REMOVAL Bilateral 2012    CERVICAL DISC SURGERY  1993    BETY    CERVICAL FUSION  1997    CHOLECYSTECTOMY  2007    with Whipple    COLONOSCOPY  2015    Dr. Bradford    EYE SURGERY Right 1997    to repair muscle     HERNIA REPAIR Right years ago    inguinal

## 2025-03-28 NOTE — PROGRESS NOTES
This patient was referred for audiometric/tympanometric testing by KATHERINE Romo due to ear pain and hearing loss.      Audiometry using pure tone air and bone conduction revealed essentially a mild loss through 500 Hz sloping to a moderate loss through 4000 Hz sloping to a severe sensorineural hearing loss at 8000 Hz in the right ear. Left ear revealed essentially mild through 500 Hz sloping to a moderate to moderate severe loss through 6000 Hz and severe sensorineural hearing loss at 8000 Hz.  Left ear slightly worse 2485-1573 Hz.   Reliability was good. Speech reception thresholds were in good agreement with the pure tone averages, bilaterally. Speech discrimination scores were excellent, bilaterally.    Tympanometry revealed normal middle ear peak pressure and compliance, in the left ear.  Right ear revealed slightly shallow compliance (0.25 l).       The patient has hearing aids through a local private practice. She signed a release of info to take the hearing test with her for any adjustments she may need of her hearing aids.     The results were reviewed with the patient and CNP.     Recommendations for follow up will be made pending ordering provider consult.    Gladys Hancock/CCC-A  [place] Lic # [id number]

## 2025-04-03 ENCOUNTER — RESULTS FOLLOW-UP (OUTPATIENT)
Dept: PRIMARY CARE CLINIC | Age: 75
End: 2025-04-03

## 2025-04-03 NOTE — RESULT ENCOUNTER NOTE
Notify patient MRI does show areas of arthritis and nerve root pressure especially at L2, 3.  Best to see the back doctor at [facility] as we discussed.  That referral has been completed.  If not better soon -----advise [facility]

## 2025-04-14 ENCOUNTER — TELEPHONE (OUTPATIENT)
Dept: AUDIOLOGY | Age: 75
End: 2025-04-14

## 2025-04-14 ENCOUNTER — TELEPHONE (OUTPATIENT)
Dept: ENT CLINIC | Age: 75
End: 2025-04-14

## 2025-04-14 NOTE — TELEPHONE ENCOUNTER
Pt called office and left a message. She has a referral to audiology and would like to schedule.    Please advise.    Electronically signed by Nancy Encarnacion on 4/14/2025 at 8:26 AM

## 2025-04-14 NOTE — TELEPHONE ENCOUNTER
Referral for Hearing Evaluation received.  Called patient and left a voicemail for the patient to call back for scheduling.    Electronically signed by Gladys Fletcher on 4/14/25 at 11:11 AM EDT     2

## 2025-04-14 NOTE — TELEPHONE ENCOUNTER
Called and spoke with patient. She stated that Aden Wells was going to refer her for hearing aids to [facility] ( he kept pointing to [facility]) but that patient has not heard anything. She has hearing aids that are 5 years old and she wants new ones. She states she has no hearing aid coverage and would like to go through [facility] self pay.   She asked about Aden Ramirez office requesting previous audiogram and I stated I knew nothing about that she would have to talk to his office staff.     Patient is scheduled for 4/28/25 115 pm arrival for 145 pm appt on 7th floor [facility].

## 2025-04-17 ENCOUNTER — TELEPHONE (OUTPATIENT)
Dept: AUDIOLOGY | Age: 75
End: 2025-04-17

## 2025-04-17 NOTE — TELEPHONE ENCOUNTER
Patient called and left message to call her back to get appt for hearing aids.  Called her to clarify. She is scheduled for 4/28 115 pm arrival. She stated that she must have forgot to put on home calendar and thanked for call back.

## 2025-04-28 ENCOUNTER — HOSPITAL ENCOUNTER (OUTPATIENT)
Dept: AUDIOLOGY | Age: 75
Discharge: HOME | End: 2025-04-28

## 2025-04-28 PROCEDURE — 9990000010 HC NO CHARGE VISIT: Performed by: AUDIOLOGIST

## 2025-04-28 NOTE — PROGRESS NOTES
Patient advised as below and verbalizes understanding.    Patient had hearing test with ALYCIA Romo on 4/17/2025. She is now interested in self pay for hearing aids. She currently has Essential 1000 Hearing aids which appear to be branch of [employer].     She had a hearing test in 2020 at [facility] which do show some changes in hearing, mostly at 1000 Hz. She was given a copy of her test at that time.     Discussed getting new hearing aids, especially rechargeable. She has not called [employer] to see if she has benefits yet.     She wants to have these hearing aids adjusted to her current hearing test and has appointment in the next few days. If adjustments are not beneficial, she will call this office to order [employer] SHERMAN, [address] sainz, 2 length, medium power.     Will await her return call as to how she wants to proceed.     No charge visit as courtesy since she is a [facility] patient.     Leonor Livingston M.A., CCC/A  [place] Lic [id number]  Electronically signed by Gladys Mujica on 4/28/2025 at 1:47 PM

## 2025-06-04 ENCOUNTER — TELEPHONE (OUTPATIENT)
Dept: PRIMARY CARE CLINIC | Age: 75
End: 2025-06-04

## 2025-06-04 NOTE — TELEPHONE ENCOUNTER
Tell patient it is okay to take clindamycin but he is usually done before the procedure.  Not after the procedure.  30 to 60 minutes

## 2025-06-04 NOTE — TELEPHONE ENCOUNTER
I have questions.  Why is she not taking amoxicillin if it is for infection prophylaxis,?.  I do not see that she is allergic to penicillin

## 2025-07-18 DIAGNOSIS — E78.2 MIXED HYPERLIPIDEMIA: Chronic | ICD-10-CM

## 2025-07-18 DIAGNOSIS — E03.9 ACQUIRED HYPOTHYROIDISM: Chronic | ICD-10-CM

## 2025-07-18 DIAGNOSIS — E11.9 DIET-CONTROLLED DIABETES MELLITUS (HCC): ICD-10-CM

## 2025-07-18 DIAGNOSIS — I10 ESSENTIAL HYPERTENSION: Chronic | ICD-10-CM

## 2025-07-18 DIAGNOSIS — E53.8 VITAMIN B 12 DEFICIENCY: ICD-10-CM

## 2025-07-18 DIAGNOSIS — E55.9 VITAMIN D DEFICIENCY: ICD-10-CM

## 2025-07-18 LAB
ALBUMIN: 4 G/DL (ref 3.5–5.2)
ALP BLD-CCNC: 228 U/L (ref 35–104)
ALT SERPL-CCNC: 33 U/L (ref 0–35)
ANION GAP SERPL CALCULATED.3IONS-SCNC: 14 MMOL/L (ref 7–16)
AST SERPL-CCNC: 30 U/L (ref 0–35)
BACTERIA: ABNORMAL
BILIRUB SERPL-MCNC: 0.6 MG/DL (ref 0–1.2)
BILIRUBIN, URINE: NEGATIVE
BUN BLDV-MCNC: 15 MG/DL (ref 8–23)
CALCIUM SERPL-MCNC: 9.7 MG/DL (ref 8.8–10.2)
CHLORIDE BLD-SCNC: 104 MMOL/L (ref 98–107)
CHOLESTEROL, TOTAL: 217 MG/DL
CO2: 25 MMOL/L (ref 22–29)
COLOR, UA: YELLOW
CREAT SERPL-MCNC: 0.9 MG/DL (ref 0.5–1)
EPITHELIAL CELLS, UA: ABNORMAL /HPF
GFR, ESTIMATED: 64 ML/MIN/1.73M2
GLUCOSE BLD-MCNC: 111 MG/DL (ref 74–99)
GLUCOSE URINE: NEGATIVE MG/DL
HBA1C MFR BLD: 5.9 % (ref 4–5.6)
HCT VFR BLD CALC: 40.1 % (ref 34–48)
HDLC SERPL-MCNC: 53 MG/DL
HEMOGLOBIN: 12.8 G/DL (ref 11.5–15.5)
KETONES, URINE: NEGATIVE MG/DL
LDL CHOLESTEROL: 141 MG/DL
LEUKOCYTE ESTERASE, URINE: ABNORMAL
MCH RBC QN AUTO: 28.9 PG (ref 26–35)
MCHC RBC AUTO-ENTMCNC: 31.9 G/DL (ref 32–34.5)
MCV RBC AUTO: 90.5 FL (ref 80–99.9)
NITRITE, URINE: NEGATIVE
PDW BLD-RTO: 13.3 % (ref 11.5–15)
PH, URINE: 6 (ref 5–8)
PLATELET # BLD: 374 K/UL (ref 130–450)
PMV BLD AUTO: 10.6 FL (ref 7–12)
POTASSIUM SERPL-SCNC: 5 MMOL/L (ref 3.5–5.1)
PROTEIN UA: NEGATIVE MG/DL
RBC # BLD: 4.43 M/UL (ref 3.5–5.5)
RBC UA: ABNORMAL /HPF
SODIUM BLD-SCNC: 143 MMOL/L (ref 136–145)
SPECIFIC GRAVITY UA: 1.01 (ref 1–1.03)
THYROXINE (T4): 9.6 UG/DL (ref 4.5–11.7)
TOTAL PROTEIN: 7 G/DL (ref 6.4–8.3)
TRIGL SERPL-MCNC: 116 MG/DL
TSH SERPL DL<=0.05 MIU/L-ACNC: 1.63 UIU/ML (ref 0.27–4.2)
TURBIDITY: CLEAR
URINE HGB: NEGATIVE
UROBILINOGEN, URINE: 0.2 EU/DL (ref 0–1)
VITAMIN B-12: 347 PG/ML (ref 232–1245)
VITAMIN D 25-HYDROXY: 31.6 NG/ML (ref 30–100)
VLDLC SERPL CALC-MCNC: 23 MG/DL
WBC # BLD: 5.6 K/UL (ref 4.5–11.5)
WBC UA: ABNORMAL /HPF

## 2025-07-22 ENCOUNTER — OFFICE VISIT (OUTPATIENT)
Dept: PRIMARY CARE CLINIC | Age: 75
End: 2025-07-22

## 2025-07-22 VITALS
DIASTOLIC BLOOD PRESSURE: 82 MMHG | BODY MASS INDEX: 30.63 KG/M2 | WEIGHT: 156 LBS | OXYGEN SATURATION: 96 % | TEMPERATURE: 98.7 F | RESPIRATION RATE: 16 BRPM | HEIGHT: 60 IN | HEART RATE: 83 BPM | SYSTOLIC BLOOD PRESSURE: 128 MMHG

## 2025-07-22 DIAGNOSIS — E78.2 MIXED HYPERLIPIDEMIA: Chronic | ICD-10-CM

## 2025-07-22 DIAGNOSIS — E03.9 ACQUIRED HYPOTHYROIDISM: Chronic | ICD-10-CM

## 2025-07-22 DIAGNOSIS — R00.2 PALPITATIONS: Chronic | ICD-10-CM

## 2025-07-22 DIAGNOSIS — E11.9 DIET-CONTROLLED DIABETES MELLITUS (HCC): ICD-10-CM

## 2025-07-22 DIAGNOSIS — Z00.00 MEDICARE ANNUAL WELLNESS VISIT, SUBSEQUENT: Primary | ICD-10-CM

## 2025-07-22 DIAGNOSIS — I10 ESSENTIAL HYPERTENSION: Chronic | ICD-10-CM

## 2025-07-22 ASSESSMENT — PATIENT HEALTH QUESTIONNAIRE - PHQ9
SUM OF ALL RESPONSES TO PHQ QUESTIONS 1-9: 0
SUM OF ALL RESPONSES TO PHQ QUESTIONS 1-9: 0
1. LITTLE INTEREST OR PLEASURE IN DOING THINGS: NOT AT ALL
SUM OF ALL RESPONSES TO PHQ QUESTIONS 1-9: 0
SUM OF ALL RESPONSES TO PHQ QUESTIONS 1-9: 0
2. FEELING DOWN, DEPRESSED OR HOPELESS: NOT AT ALL

## 2025-07-22 ASSESSMENT — LIFESTYLE VARIABLES: HOW MANY STANDARD DRINKS CONTAINING ALCOHOL DO YOU HAVE ON A TYPICAL DAY: PATIENT DOES NOT DRINK

## 2025-07-22 NOTE — PROGRESS NOTES
Medicare Annual Wellness Visit    Sofia Nunez is here for Medicare AW and Discuss Labs    Assessment & Plan  1. Hypothyroidism.  - Her condition is stable with the current combination of thyroid medications.  - Thyroid labs are within normal limits.  - She is currently taking 100 mcg three times a week and 88 mcg four times a week.  - Continue current thyroid medication regimen.    2. Anxiety.  - She continues to take Prozac for anxiety and reports that it helps her.  - Prozac should not be stopped abruptly.  - Discussed the importance of tapering off Prozac slowly if needed.  - Continue Prozac as prescribed.    3. Hyperlipidemia.  - Cholesterol levels have increased from 165 to 217, with LDL cholesterol rising from 99 to 141.  - HDL cholesterol is 53, which is within the desired range.  - Advised to increase physical activity and reduce intake of greasy and fast foods.  - Monitor cholesterol levels and dietary habits.    4. Prediabetes.  - Fasting blood sugar is 111, and A1c has decreased from 6.3 to 5.9, placing her in the prediabetic range.  - Advised to continue monitoring intake of sugars and starchy carbohydrates.  - Discussed the impact of steroids on blood sugar levels.  - Continue dietary management to maintain blood sugar levels.    5. Osteoarthritis.  - Reports ongoing pain in her lower back and thigh, attributed to knee surgery.  - Scheduled to receive a series of four shots from Dr. Carroll for back pain starting on 07/31/2025.  - Previous steroid injection in 04/2025 provided temporary relief.  - Monitor response to upcoming injections and pain management.    6. Health maintenance.  - Upcoming appointment with cardiologist in mid-September 2025 for palpitations.  - Last cardiology visit was on 09/13/2024, with stable palpitations.  - Ensure follow-up with cardiologist to monitor heart condition.    7. Ear wax.  - Cerumen was removed from her ears during this visit.  - Reports feeling like her

## (undated) DEVICE — SOLUTION WND IRRIGATION 450 ML 0.5 PVP-I 0.9 NACL

## (undated) DEVICE — Device

## (undated) DEVICE — 3M™ IOBAN™ 2 ANTIMICROBIAL INCISE DRAPE 6650EZ: Brand: IOBAN™ 2

## (undated) DEVICE — BNDG,ELSTC,MATRIX,STRL,6"X5YD,LF,HOOK&LP: Brand: MEDLINE

## (undated) DEVICE — 3M™ STERI-DRAPE™ U-DRAPE 1015: Brand: STERI-DRAPE™

## (undated) DEVICE — ELECTRODE PT RET AD L9FT HI MOIST COND ADH HYDRGEL CORDED

## (undated) DEVICE — TAPE ADH W3INXL10YD WHT COT WVN BK POWERFUL RUB BASE HIGHLY

## (undated) DEVICE — SYRINGE 20ML LL S/C 50

## (undated) DEVICE — BASIC SINGLE BASIN 1-LF: Brand: MEDLINE INDUSTRIES, INC.

## (undated) DEVICE — DRAPE THER FLUID WARMING 66X44 IN FLAT SLUSH DBL DISC ORS

## (undated) DEVICE — STRIP SKIN CLSR W1XL5IN NYL REINF CURAD

## (undated) DEVICE — MARKER,SKIN,WI/RULER AND LABELS: Brand: MEDLINE

## (undated) DEVICE — STRIP,CLOSURE,WOUND,MEDI-STRIP,1/2X4: Brand: MEDLINE

## (undated) DEVICE — Device: Brand: STABLECUT®

## (undated) DEVICE — HANDPIECE SET WITH COAXIAL HIGH FLOW TIP AND SUCTION TUBE: Brand: INTERPULSE

## (undated) DEVICE — PATIENT RETURN ELECTRODE, SINGLE-USE, CONTACT QUALITY MONITORING, ADULT, WITH 9FT CORD, FOR PATIENTS WEIGING OVER 33LBS. (15KG): Brand: MEGADYNE

## (undated) DEVICE — SUTURE STRATAFIX SYMMETRIC PDS + SZ 1 L18IN ABSRB VLT OS-6 SXPP1A201

## (undated) DEVICE — NEEDLE HYPO 23GA L1.5IN TURQ POLYPR HUB S STL THN WALL IM

## (undated) DEVICE — CLOTH SURG PREP PREOPERATIVE CHLORHEXIDINE GLUC 2% READYPREP

## (undated) DEVICE — DOUBLE BASIN SET: Brand: MEDLINE INDUSTRIES, INC.

## (undated) DEVICE — GLOVE SURG SZ 65 L12IN FNGR THK79MIL GRN LTX FREE

## (undated) DEVICE — CORD,CAUTERY,BIPOLAR,STERILE: Brand: MEDLINE

## (undated) DEVICE — BANDAGE,GAUZE,4.5"X4.1YD,STERILE,LF: Brand: MEDLINE

## (undated) DEVICE — SURGICAL PROCEDURE PACK BASIC

## (undated) DEVICE — RECIPROCATING BLADE HEAVY DUTY, OFFSET  (77.5 X 1.23 X 11.0MM)

## (undated) DEVICE — GLOVE SURG SZ 65 THK91MIL LTX FREE SYN POLYISOPRENE

## (undated) DEVICE — GLOVE ORTHO 8   MSG9480

## (undated) DEVICE — PLASMABLADE PS210-030S 3.0S LOCK: Brand: PLASMABLADE™

## (undated) DEVICE — 2108 SERIES SAGITTAL BLADE FAN, OFFSET  (34.5 X 0.8 X 64.0MM)

## (undated) DEVICE — SPONGE LAP W18XL18IN WHT COT 4 PLY FLD STRUNG RADPQ DISP ST 2 PER PACK

## (undated) DEVICE — DRAPE,TOP,102X53,STERILE: Brand: MEDLINE

## (undated) DEVICE — INTENDED FOR TISSUE SEPARATION, AND OTHER PROCEDURES THAT REQUIRE A SHARP SURGICAL BLADE TO PUNCTURE OR CUT.: Brand: BARD-PARKER ® STAINLESS STEEL BLADES

## (undated) DEVICE — DECANTER BAG 9": Brand: MEDLINE INDUSTRIES, INC.

## (undated) DEVICE — GLOVE SURG SZ 8 CRM LTX FREE POLYISOPRENE POLYMER BEAD ANTI

## (undated) DEVICE — SOLUTION IRRIG 3000ML 0.9% SOD CHL USP UROMATIC PLAS CONT

## (undated) DEVICE — TUBING, SUCTION, 3/16" X 12', STRAIGHT: Brand: MEDLINE

## (undated) DEVICE — STOCKINETTE,DOUBLE PLY,6X48,STERILE: Brand: MEDLINE

## (undated) DEVICE — GOWN,SIRUS,FABRNF,XL,20/CS: Brand: MEDLINE

## (undated) DEVICE — PACK,UNIV, II AURORA: Brand: MEDLINE

## (undated) DEVICE — SYSTEM VAC MIX SGL DBL CLEARMIX 10 PER CA

## (undated) DEVICE — STANDARD HYPODERMIC NEEDLE,ALUMINUM HUB: Brand: MONOJECT

## (undated) DEVICE — SOLUTION IV 1000ML 0.9% SOD CHL PH 5 INJ USP VIAFLX PLAS

## (undated) DEVICE — CONTROL SYRINGE LUER-LOCK TIP: Brand: MONOJECT

## (undated) DEVICE — BNDG,ELSTC,MATRIX,STRL,4"X5YD,LF,HOOK&LP: Brand: MEDLINE

## (undated) DEVICE — STERILE POLYISOPRENE POWDER-FREE SURGICAL GLOVES: Brand: PROTEXIS

## (undated) DEVICE — TUBING, SUCTION, 9/32" X 10', STRAIGHT: Brand: MEDLINE

## (undated) DEVICE — DRAPE,REIN 53X77,STERILE: Brand: MEDLINE

## (undated) DEVICE — TOWEL,OR,DSP,ST,BLUE,DLX,10/PK,8PK/CS: Brand: MEDLINE

## (undated) DEVICE — RETRACTOR 50-101-1 RADIALUX US: Brand: RADIALUX™

## (undated) DEVICE — TOWEL,OR,DSP,ST,BLUE,STD,6/PK,12PK/CS: Brand: MEDLINE

## (undated) DEVICE — Z CONVERTED USE 2275207 CLOTH PREP W7.5XL7.5IN 2% CHG SKIN ALC AND RNS FREE

## (undated) DEVICE — BRA SURGICALXL FULL SUPP SFT CUP FR CLSR ADJ STRP

## (undated) DEVICE — SYRINGE IRRIG 60ML SFT PLIABLE BLB EZ TO GRP 1 HND USE W/

## (undated) DEVICE — BANDAGE COMPR W6INXL12FT SMOOTH FOR LIMB EXSANG ESMARCH

## (undated) DEVICE — TRAY PROCED PLASTIC CUSTOME SOFT TISS

## (undated) DEVICE — KIT EVAC 400CC DIA1/8IN H PAT 12.5IN 3 SPR RND SHP PVC DRN

## (undated) DEVICE — APPLICATOR MEDICATED 26 CC SOLUTION HI LT ORNG CHLORAPREP

## (undated) DEVICE — KIT SURG W7XL11IN 2 PKT UNTREATED NA

## (undated) DEVICE — SKIN AFFIX SURG ADHESIVE 72/CS 0.55ML: Brand: MEDLINE

## (undated) DEVICE — CHLORAPREP 26ML ORANGE

## (undated) DEVICE — 4-PORT MANIFOLD: Brand: NEPTUNE 2

## (undated) DEVICE — PADDING,UNDERCAST,COTTON, 4"X4YD STERILE: Brand: MEDLINE

## (undated) DEVICE — DRIP REDUCTION MANIFOLD